# Patient Record
Sex: MALE | Race: WHITE | NOT HISPANIC OR LATINO
[De-identification: names, ages, dates, MRNs, and addresses within clinical notes are randomized per-mention and may not be internally consistent; named-entity substitution may affect disease eponyms.]

---

## 2017-03-18 ENCOUNTER — NON-APPOINTMENT (OUTPATIENT)
Age: 67
End: 2017-03-18

## 2017-03-18 ENCOUNTER — APPOINTMENT (OUTPATIENT)
Dept: CARDIOLOGY | Facility: CLINIC | Age: 67
End: 2017-03-18

## 2017-03-18 VITALS — DIASTOLIC BLOOD PRESSURE: 68 MMHG | SYSTOLIC BLOOD PRESSURE: 100 MMHG | HEART RATE: 72 BPM

## 2017-03-18 VITALS
OXYGEN SATURATION: 94 % | HEART RATE: 79 BPM | BODY MASS INDEX: 28.99 KG/M2 | DIASTOLIC BLOOD PRESSURE: 69 MMHG | RESPIRATION RATE: 12 BRPM | HEIGHT: 65 IN | WEIGHT: 174 LBS | TEMPERATURE: 97.7 F | SYSTOLIC BLOOD PRESSURE: 103 MMHG

## 2017-03-18 LAB
INR PPP: 2.2 RATIO
POCT-PROTHROMBIN TIME: 26.8 SECS

## 2017-07-08 ENCOUNTER — APPOINTMENT (OUTPATIENT)
Dept: CARDIOLOGY | Facility: CLINIC | Age: 67
End: 2017-07-08

## 2017-07-08 ENCOUNTER — NON-APPOINTMENT (OUTPATIENT)
Age: 67
End: 2017-07-08

## 2017-07-08 VITALS
SYSTOLIC BLOOD PRESSURE: 102 MMHG | HEIGHT: 65 IN | HEART RATE: 77 BPM | BODY MASS INDEX: 27.99 KG/M2 | TEMPERATURE: 98.3 F | WEIGHT: 168 LBS | OXYGEN SATURATION: 97 % | DIASTOLIC BLOOD PRESSURE: 70 MMHG | RESPIRATION RATE: 12 BRPM

## 2017-07-08 LAB — INR PPP: 1.8 RATIO

## 2017-11-11 ENCOUNTER — APPOINTMENT (OUTPATIENT)
Dept: CARDIOLOGY | Facility: CLINIC | Age: 67
End: 2017-11-11
Payer: MEDICARE

## 2017-11-11 ENCOUNTER — NON-APPOINTMENT (OUTPATIENT)
Age: 67
End: 2017-11-11

## 2017-11-11 VITALS
OXYGEN SATURATION: 97 % | DIASTOLIC BLOOD PRESSURE: 73 MMHG | SYSTOLIC BLOOD PRESSURE: 107 MMHG | BODY MASS INDEX: 28.32 KG/M2 | TEMPERATURE: 97.8 F | HEIGHT: 65 IN | WEIGHT: 170 LBS | RESPIRATION RATE: 12 BRPM | HEART RATE: 83 BPM

## 2017-11-11 LAB
INR PPP: 2.9 RATIO
POCT-PROTHROMBIN TIME: 35.2 SECS

## 2017-11-11 PROCEDURE — 93290 INTERROG DEV EVAL ICPMS IP: CPT | Mod: 26

## 2017-11-11 PROCEDURE — 93283 PRGRMG EVAL IMPLANTABLE DFB: CPT

## 2017-11-11 PROCEDURE — 85610 PROTHROMBIN TIME: CPT | Mod: QW

## 2017-11-11 PROCEDURE — 99215 OFFICE O/P EST HI 40 MIN: CPT | Mod: 25

## 2018-07-05 ENCOUNTER — OFFICE VISIT (OUTPATIENT)
Dept: URGENT CARE | Facility: CLINIC | Age: 68
End: 2018-07-05
Payer: COMMERCIAL

## 2018-07-05 VITALS
RESPIRATION RATE: 18 BRPM | TEMPERATURE: 97.2 F | DIASTOLIC BLOOD PRESSURE: 72 MMHG | BODY MASS INDEX: 28.82 KG/M2 | WEIGHT: 173 LBS | OXYGEN SATURATION: 97 % | HEART RATE: 72 BPM | HEIGHT: 65 IN | SYSTOLIC BLOOD PRESSURE: 112 MMHG

## 2018-07-05 DIAGNOSIS — S99.921A TOE INJURY, RIGHT, INITIAL ENCOUNTER: Primary | ICD-10-CM

## 2018-07-05 PROCEDURE — 99203 OFFICE O/P NEW LOW 30 MIN: CPT | Performed by: PHYSICIAN ASSISTANT

## 2018-07-05 RX ORDER — WARFARIN SODIUM 5 MG/1
TABLET ORAL
COMMUNITY

## 2018-07-05 RX ORDER — TAMSULOSIN HYDROCHLORIDE 0.4 MG/1
0.4 CAPSULE ORAL
COMMUNITY

## 2018-07-05 RX ORDER — CARVEDILOL 25 MG/1
25 TABLET ORAL 2 TIMES DAILY WITH MEALS
COMMUNITY

## 2018-07-05 RX ORDER — CEPHALEXIN 500 MG/1
500 CAPSULE ORAL EVERY 8 HOURS SCHEDULED
Qty: 21 CAPSULE | Refills: 0 | Status: SHIPPED | COMMUNITY
Start: 2018-07-05 | End: 2018-07-12

## 2018-07-05 RX ORDER — LISINOPRIL 5 MG/1
5 TABLET ORAL DAILY
COMMUNITY

## 2018-07-05 RX ORDER — SIMVASTATIN 20 MG
20 TABLET ORAL
COMMUNITY

## 2018-07-05 NOTE — PATIENT INSTRUCTIONS
Toenail is still intact  There is bleeding around the toenail  Advised patient that I could not remove the toenail in the clinic  There does not seem to be in injury to the nail bed  Will prescribe antibiotic  He should start taking the Keflex and finish the antibiotic  Take it as directed  Recommend follow-up with podiatrist in the next week  If he has worsening pain or the toe is becoming red or infected looking he should go to the emergency room

## 2018-07-05 NOTE — PROGRESS NOTES
[address] [facility]    NAME: Casi Doran is a 75 y o  male  : 1950    MRN: [id number]  DATE: 2018  TIME: 5:08 PM    Assessment and Plan   Toe injury, right, initial encounter [S99 921A]  1  Toe injury, right, initial encounter  cephalexin (KEFLEX) 500 mg capsule       Patient Instructions     Patient Instructions   Toenail is still intact  There is bleeding around the toenail  Advised patient that I could not remove the toenail in the clinic  There does not seem to be in injury to the nail bed  Will prescribe antibiotic  He should start taking the Keflex and finish the antibiotic  Take it as directed  Recommend follow-up with podiatrist in the next week  If he has worsening pain or the toe is becoming red or infected looking he should go to the emergency room  Chief Complaint     Chief Complaint   Patient presents with    Foot Pain     C/O injury to right great toe after stubbing his toe on concrete pool steps today  History of Present Illness   75year old male here with right great toe injury  Patient was swimming and getting out of the pool and hit his toe on the concrete steps  He has some bleeding around his nail  Patient is on Coumadin  Denies any pain  Is able to move his toe without any difficulty  Does have a thickened nail  Review of Systems   Review of Systems   Constitutional: Negative for activity change, appetite change, chills, diaphoresis, fatigue, fever and unexpected weight change  HENT: Negative for congestion, ear pain, hearing loss, sinus pressure, sneezing, sore throat and tinnitus  Eyes: Negative for photophobia, redness and visual disturbance  Respiratory: Negative for apnea, cough, chest tightness, shortness of breath, wheezing and stridor  Cardiovascular: Negative for chest pain, palpitations and leg swelling     Gastrointestinal: Negative for abdominal distention, abdominal pain, blood in stool, constipation, diarrhea, nausea and vomiting  Endocrine: Negative for cold intolerance, heat intolerance, polydipsia, polyphagia and polyuria  Genitourinary: Negative for difficulty urinating, dysuria, flank pain, hematuria and urgency  Musculoskeletal: Negative for arthralgias, back pain, gait problem, myalgias, neck pain and neck stiffness  See HPI   Skin: Negative for rash and wound  Neurological: Negative for dizziness, tremors, seizures, syncope, weakness, light-headedness, numbness and headaches  Hematological: Negative for adenopathy  Does not bruise/bleed easily  Psychiatric/Behavioral: Negative for agitation, behavioral problems, confusion and decreased concentration  The patient is not nervous/anxious  All other systems reviewed and are negative        Current Medications     Current Outpatient Prescriptions:     carvedilol (COREG) 25 mg tablet, Take 25 mg by mouth 2 (two) times a day with meals, Disp: , Rfl:     lisinopril (ZESTRIL) 5 mg tablet, Take 5 mg by mouth daily, Disp: , Rfl:     simvastatin (ZOCOR) 20 mg tablet, Take 20 mg by mouth daily at bedtime, Disp: , Rfl:     tamsulosin (FLOMAX) 0 4 mg, Take 0 4 mg by mouth daily with dinner, Disp: , Rfl:     warfarin (COUMADIN) 5 mg tablet, Take by mouth daily, Disp: , Rfl:     cephalexin (KEFLEX) 500 mg capsule, Take 1 capsule (500 mg total) by mouth every 8 (eight) hours for 7 days, Disp: 21 capsule, Rfl: 0    Current Allergies     Allergies as of 07/05/2018    (No Known Allergies)          The following portions of the patient's history were reviewed and updated as appropriate: allergies, current medications, past family history, past medical history, past social history, past surgical history and problem list    Past Medical History:   Diagnosis Date    BPH (benign prostatic hyperplasia)     CHF (congestive heart failure) ([facility] [address] )     Hyperlipidemia     Hypertension     ICD (implantable cardioverter-defibrillator) in place      Past Surgical History: Procedure Laterality Date    CHOLECYSTECTOMY       No family history on file  Social History     Social History    Marital status: /Civil Union     Spouse name: N/A    Number of children: N/A    Years of education: N/A     Occupational History    Not on file  Social History Main Topics    Smoking status: Never Smoker    Smokeless tobacco: Never Used    Alcohol use Not on file    Drug use: Unknown    Sexual activity: Not on file     Other Topics Concern    Not on file     Social History Narrative    No narrative on file     Medications have been verified  Objective   /72   Pulse 72   Temp (!) 97 2 °F (36 2 °C) (Tympanic)   Resp 18   Ht 5' 5" (1 651 m)   Wt 78 5 kg (173 lb)   SpO2 97%   BMI 28 79 kg/m²      Physical Exam   Physical Exam   Constitutional: He appears well-developed and well-nourished  No distress  HENT:   Head: Normocephalic  Right Ear: External ear normal    Left Ear: External ear normal    Nose: Nose normal    Mouth/Throat: Oropharynx is clear and moist  No oropharyngeal exudate  Neck: Normal range of motion  Neck supple  Cardiovascular: Normal rate, regular rhythm and normal heart sounds  No murmur heard  Pulmonary/Chest: Effort normal and breath sounds normal  No respiratory distress  He has no wheezes  He has no rales  Abdominal: Soft  Bowel sounds are normal  There is no tenderness  Musculoskeletal: Normal range of motion  Feet:    Lymphadenopathy:     He has no cervical adenopathy  Skin: Skin is warm  No rash noted  Vitals reviewed

## 2018-08-31 ENCOUNTER — APPOINTMENT (OUTPATIENT)
Dept: CARDIOLOGY | Facility: CLINIC | Age: 68
End: 2018-08-31
Payer: MEDICARE

## 2018-08-31 ENCOUNTER — NON-APPOINTMENT (OUTPATIENT)
Age: 68
End: 2018-08-31

## 2018-08-31 VITALS
BODY MASS INDEX: 28.32 KG/M2 | RESPIRATION RATE: 12 BRPM | WEIGHT: 170 LBS | HEART RATE: 79 BPM | OXYGEN SATURATION: 96 % | SYSTOLIC BLOOD PRESSURE: 131 MMHG | HEIGHT: 65 IN | TEMPERATURE: 97.9 F | DIASTOLIC BLOOD PRESSURE: 88 MMHG

## 2018-08-31 LAB
INR PPP: 2.1 RATIO
POCT-PROTHROMBIN TIME: 25.2 SECS
QUALITY CONTROL: YES

## 2018-08-31 PROCEDURE — 93290 INTERROG DEV EVAL ICPMS IP: CPT | Mod: 26

## 2018-08-31 PROCEDURE — 99215 OFFICE O/P EST HI 40 MIN: CPT | Mod: 25

## 2018-08-31 PROCEDURE — 93283 PRGRMG EVAL IMPLANTABLE DFB: CPT

## 2018-08-31 PROCEDURE — 93306 TTE W/DOPPLER COMPLETE: CPT

## 2018-08-31 PROCEDURE — 85610 PROTHROMBIN TIME: CPT | Mod: QW

## 2018-09-21 ENCOUNTER — OUTPATIENT (OUTPATIENT)
Dept: OUTPATIENT SERVICES | Facility: HOSPITAL | Age: 68
LOS: 1 days | End: 2018-09-21
Payer: MEDICARE

## 2018-09-21 ENCOUNTER — APPOINTMENT (OUTPATIENT)
Dept: CV DIAGNOSTICS | Facility: HOSPITAL | Age: 68
End: 2018-09-21

## 2018-09-21 DIAGNOSIS — Z95.5 PRESENCE OF CORONARY ANGIOPLASTY IMPLANT AND GRAFT: Chronic | ICD-10-CM

## 2018-09-21 DIAGNOSIS — I25.10 ATHEROSCLEROTIC HEART DISEASE OF NATIVE CORONARY ARTERY WITHOUT ANGINA PECTORIS: ICD-10-CM

## 2018-09-21 DIAGNOSIS — Z95.810 PRESENCE OF AUTOMATIC (IMPLANTABLE) CARDIAC DEFIBRILLATOR: Chronic | ICD-10-CM

## 2018-09-21 DIAGNOSIS — S82.90XA UNSPECIFIED FRACTURE OF UNSPECIFIED LOWER LEG, INITIAL ENCOUNTER FOR CLOSED FRACTURE: Chronic | ICD-10-CM

## 2018-09-21 PROCEDURE — 93018 CV STRESS TEST I&R ONLY: CPT

## 2018-09-21 PROCEDURE — 78452 HT MUSCLE IMAGE SPECT MULT: CPT

## 2018-09-21 PROCEDURE — 93016 CV STRESS TEST SUPVJ ONLY: CPT

## 2018-09-21 PROCEDURE — 78452 HT MUSCLE IMAGE SPECT MULT: CPT | Mod: 26

## 2018-09-21 PROCEDURE — A9500: CPT

## 2018-09-21 PROCEDURE — 93017 CV STRESS TEST TRACING ONLY: CPT

## 2018-09-22 ENCOUNTER — TRANSCRIPTION ENCOUNTER (OUTPATIENT)
Age: 68
End: 2018-09-22

## 2018-09-23 ENCOUNTER — INPATIENT (INPATIENT)
Facility: HOSPITAL | Age: 68
LOS: 1 days | Discharge: HOME | End: 2018-09-25
Attending: SURGERY | Admitting: SURGERY
Payer: MEDICARE

## 2018-09-23 ENCOUNTER — RESULT REVIEW (OUTPATIENT)
Age: 68
End: 2018-09-23

## 2018-09-23 VITALS
DIASTOLIC BLOOD PRESSURE: 69 MMHG | SYSTOLIC BLOOD PRESSURE: 108 MMHG | TEMPERATURE: 98 F | RESPIRATION RATE: 18 BRPM | OXYGEN SATURATION: 100 % | HEART RATE: 96 BPM

## 2018-09-23 DIAGNOSIS — Z95.810 PRESENCE OF AUTOMATIC (IMPLANTABLE) CARDIAC DEFIBRILLATOR: Chronic | ICD-10-CM

## 2018-09-23 DIAGNOSIS — S82.90XA UNSPECIFIED FRACTURE OF UNSPECIFIED LOWER LEG, INITIAL ENCOUNTER FOR CLOSED FRACTURE: Chronic | ICD-10-CM

## 2018-09-23 DIAGNOSIS — K37 UNSPECIFIED APPENDICITIS: ICD-10-CM

## 2018-09-23 DIAGNOSIS — Z95.5 PRESENCE OF CORONARY ANGIOPLASTY IMPLANT AND GRAFT: Chronic | ICD-10-CM

## 2018-09-23 DIAGNOSIS — Z90.49 ACQUIRED ABSENCE OF OTHER SPECIFIED PARTS OF DIGESTIVE TRACT: Chronic | ICD-10-CM

## 2018-09-23 LAB
ALBUMIN SERPL ELPH-MCNC: 3.9 G/DL — SIGNIFICANT CHANGE UP (ref 3.3–5)
ALP SERPL-CCNC: 64 U/L — SIGNIFICANT CHANGE UP (ref 40–120)
ALT FLD-CCNC: 19 U/L — SIGNIFICANT CHANGE UP (ref 4–41)
APPEARANCE UR: CLEAR — SIGNIFICANT CHANGE UP
APTT BLD: 38.7 SEC — HIGH (ref 27.5–37.4)
AST SERPL-CCNC: 31 U/L — SIGNIFICANT CHANGE UP (ref 4–40)
BACTERIA # UR AUTO: NEGATIVE — SIGNIFICANT CHANGE UP
BASE EXCESS BLDV CALC-SCNC: 5.3 MMOL/L — SIGNIFICANT CHANGE UP
BASOPHILS # BLD AUTO: 0.03 K/UL — SIGNIFICANT CHANGE UP (ref 0–0.2)
BASOPHILS NFR BLD AUTO: 0.3 % — SIGNIFICANT CHANGE UP (ref 0–2)
BILIRUB SERPL-MCNC: 3.4 MG/DL — HIGH (ref 0.2–1.2)
BILIRUB UR-MCNC: NEGATIVE — SIGNIFICANT CHANGE UP
BLD GP AB SCN SERPL QL: NEGATIVE — SIGNIFICANT CHANGE UP
BLOOD GAS VENOUS - CREATININE: 1.14 MG/DL — SIGNIFICANT CHANGE UP (ref 0.5–1.3)
BLOOD UR QL VISUAL: SIGNIFICANT CHANGE UP
BUN SERPL-MCNC: 14 MG/DL — SIGNIFICANT CHANGE UP (ref 7–23)
CALCIUM SERPL-MCNC: 9.3 MG/DL — SIGNIFICANT CHANGE UP (ref 8.4–10.5)
CHLORIDE BLDV-SCNC: 98 MMOL/L — SIGNIFICANT CHANGE UP (ref 96–108)
CHLORIDE SERPL-SCNC: 95 MMOL/L — LOW (ref 98–107)
CO2 SERPL-SCNC: 25 MMOL/L — SIGNIFICANT CHANGE UP (ref 22–31)
COLOR SPEC: YELLOW — SIGNIFICANT CHANGE UP
CREAT SERPL-MCNC: 1.13 MG/DL — SIGNIFICANT CHANGE UP (ref 0.5–1.3)
EOSINOPHIL # BLD AUTO: 0.02 K/UL — SIGNIFICANT CHANGE UP (ref 0–0.5)
EOSINOPHIL NFR BLD AUTO: 0.2 % — SIGNIFICANT CHANGE UP (ref 0–6)
GAS PNL BLDV: 134 MMOL/L — LOW (ref 136–146)
GLUCOSE BLDV-MCNC: 127 — HIGH (ref 70–99)
GLUCOSE SERPL-MCNC: 126 MG/DL — HIGH (ref 70–99)
GLUCOSE UR-MCNC: NEGATIVE — SIGNIFICANT CHANGE UP
HCO3 BLDV-SCNC: 26 MMOL/L — SIGNIFICANT CHANGE UP (ref 20–27)
HCT VFR BLD CALC: 47 % — SIGNIFICANT CHANGE UP (ref 39–50)
HCT VFR BLDV CALC: 49.3 % — SIGNIFICANT CHANGE UP (ref 39–51)
HGB BLD-MCNC: 15.5 G/DL — SIGNIFICANT CHANGE UP (ref 13–17)
HGB BLDV-MCNC: 16.1 G/DL — SIGNIFICANT CHANGE UP (ref 13–17)
HYALINE CASTS # UR AUTO: NEGATIVE — SIGNIFICANT CHANGE UP
IMM GRANULOCYTES # BLD AUTO: 0.06 # — SIGNIFICANT CHANGE UP
IMM GRANULOCYTES NFR BLD AUTO: 0.5 % — SIGNIFICANT CHANGE UP (ref 0–1.5)
INR BLD: 2.33 — HIGH (ref 0.88–1.17)
KETONES UR-MCNC: SIGNIFICANT CHANGE UP
LACTATE BLDV-MCNC: 1.4 MMOL/L — SIGNIFICANT CHANGE UP (ref 0.5–2)
LEUKOCYTE ESTERASE UR-ACNC: NEGATIVE — SIGNIFICANT CHANGE UP
LIDOCAIN IGE QN: 30.8 U/L — SIGNIFICANT CHANGE UP (ref 7–60)
LYMPHOCYTES # BLD AUTO: 1.45 K/UL — SIGNIFICANT CHANGE UP (ref 1–3.3)
LYMPHOCYTES # BLD AUTO: 12.2 % — LOW (ref 13–44)
MCHC RBC-ENTMCNC: 28.9 PG — SIGNIFICANT CHANGE UP (ref 27–34)
MCHC RBC-ENTMCNC: 33 % — SIGNIFICANT CHANGE UP (ref 32–36)
MCV RBC AUTO: 87.7 FL — SIGNIFICANT CHANGE UP (ref 80–100)
MONOCYTES # BLD AUTO: 1.33 K/UL — HIGH (ref 0–0.9)
MONOCYTES NFR BLD AUTO: 11.2 % — SIGNIFICANT CHANGE UP (ref 2–14)
NEUTROPHILS # BLD AUTO: 9 K/UL — HIGH (ref 1.8–7.4)
NEUTROPHILS NFR BLD AUTO: 75.6 % — SIGNIFICANT CHANGE UP (ref 43–77)
NITRITE UR-MCNC: NEGATIVE — SIGNIFICANT CHANGE UP
NRBC # FLD: 0 — SIGNIFICANT CHANGE UP
PCO2 BLDV: 55 MMHG — HIGH (ref 41–51)
PH BLDV: 7.36 PH — SIGNIFICANT CHANGE UP (ref 7.32–7.43)
PH UR: 6 — SIGNIFICANT CHANGE UP (ref 5–8)
PLATELET # BLD AUTO: 197 K/UL — SIGNIFICANT CHANGE UP (ref 150–400)
PMV BLD: 10 FL — SIGNIFICANT CHANGE UP (ref 7–13)
PO2 BLDV: < 24 MMHG — LOW (ref 35–40)
POTASSIUM BLDV-SCNC: 4.1 MMOL/L — SIGNIFICANT CHANGE UP (ref 3.4–4.5)
POTASSIUM SERPL-MCNC: 4.9 MMOL/L — SIGNIFICANT CHANGE UP (ref 3.5–5.3)
POTASSIUM SERPL-SCNC: 4.9 MMOL/L — SIGNIFICANT CHANGE UP (ref 3.5–5.3)
PROT SERPL-MCNC: 7.6 G/DL — SIGNIFICANT CHANGE UP (ref 6–8.3)
PROT UR-MCNC: 50 — SIGNIFICANT CHANGE UP
PROTHROM AB SERPL-ACNC: 26.3 SEC — HIGH (ref 9.8–13.1)
RBC # BLD: 5.36 M/UL — SIGNIFICANT CHANGE UP (ref 4.2–5.8)
RBC # FLD: 13.1 % — SIGNIFICANT CHANGE UP (ref 10.3–14.5)
RBC CASTS # UR COMP ASSIST: SIGNIFICANT CHANGE UP (ref 0–?)
RH IG SCN BLD-IMP: POSITIVE — SIGNIFICANT CHANGE UP
RH IG SCN BLD-IMP: POSITIVE — SIGNIFICANT CHANGE UP
SAO2 % BLDV: 14.1 % — LOW (ref 60–85)
SODIUM SERPL-SCNC: 134 MMOL/L — LOW (ref 135–145)
SP GR SPEC: 1.03 — SIGNIFICANT CHANGE UP (ref 1–1.04)
SQUAMOUS # UR AUTO: SIGNIFICANT CHANGE UP
UROBILINOGEN FLD QL: SIGNIFICANT CHANGE UP
WBC # BLD: 11.89 K/UL — HIGH (ref 3.8–10.5)
WBC # FLD AUTO: 11.89 K/UL — HIGH (ref 3.8–10.5)
WBC UR QL: SIGNIFICANT CHANGE UP (ref 0–?)

## 2018-09-23 PROCEDURE — 44970 LAPAROSCOPY APPENDECTOMY: CPT | Mod: GC

## 2018-09-23 PROCEDURE — 71045 X-RAY EXAM CHEST 1 VIEW: CPT | Mod: 26

## 2018-09-23 PROCEDURE — 88304 TISSUE EXAM BY PATHOLOGIST: CPT | Mod: 26

## 2018-09-23 PROCEDURE — 99222 1ST HOSP IP/OBS MODERATE 55: CPT | Mod: GC,57

## 2018-09-23 PROCEDURE — 74177 CT ABD & PELVIS W/CONTRAST: CPT | Mod: 26

## 2018-09-23 RX ORDER — SODIUM CHLORIDE 9 MG/ML
1000 INJECTION, SOLUTION INTRAVENOUS
Qty: 0 | Refills: 0 | Status: DISCONTINUED | OUTPATIENT
Start: 2018-09-23 | End: 2018-09-24

## 2018-09-23 RX ORDER — HYDROMORPHONE HYDROCHLORIDE 2 MG/ML
0.5 INJECTION INTRAMUSCULAR; INTRAVENOUS; SUBCUTANEOUS
Qty: 0 | Refills: 0 | Status: DISCONTINUED | OUTPATIENT
Start: 2018-09-23 | End: 2018-09-24

## 2018-09-23 RX ORDER — SIMVASTATIN 20 MG/1
20 TABLET, FILM COATED ORAL AT BEDTIME
Qty: 0 | Refills: 0 | Status: DISCONTINUED | OUTPATIENT
Start: 2018-09-23 | End: 2018-09-25

## 2018-09-23 RX ORDER — ONDANSETRON 8 MG/1
4 TABLET, FILM COATED ORAL ONCE
Qty: 0 | Refills: 0 | Status: DISCONTINUED | OUTPATIENT
Start: 2018-09-23 | End: 2018-09-24

## 2018-09-23 RX ORDER — PIPERACILLIN AND TAZOBACTAM 4; .5 G/20ML; G/20ML
3.38 INJECTION, POWDER, LYOPHILIZED, FOR SOLUTION INTRAVENOUS ONCE
Qty: 0 | Refills: 0 | Status: COMPLETED | OUTPATIENT
Start: 2018-09-23 | End: 2018-09-23

## 2018-09-23 RX ORDER — LISINOPRIL 2.5 MG/1
5 TABLET ORAL DAILY
Qty: 0 | Refills: 0 | Status: DISCONTINUED | OUTPATIENT
Start: 2018-09-23 | End: 2018-09-25

## 2018-09-23 RX ORDER — SODIUM CHLORIDE 9 MG/ML
500 INJECTION INTRAMUSCULAR; INTRAVENOUS; SUBCUTANEOUS ONCE
Qty: 0 | Refills: 0 | Status: COMPLETED | OUTPATIENT
Start: 2018-09-23 | End: 2018-09-23

## 2018-09-23 RX ORDER — ASPIRIN/CALCIUM CARB/MAGNESIUM 324 MG
81 TABLET ORAL DAILY
Qty: 0 | Refills: 0 | Status: DISCONTINUED | OUTPATIENT
Start: 2018-09-23 | End: 2018-09-25

## 2018-09-23 RX ORDER — CEFOTETAN DISODIUM 1 G
2 VIAL (EA) INJECTION EVERY 12 HOURS
Qty: 0 | Refills: 0 | Status: DISCONTINUED | OUTPATIENT
Start: 2018-09-24 | End: 2018-09-25

## 2018-09-23 RX ORDER — HYDROMORPHONE HYDROCHLORIDE 2 MG/ML
0.5 INJECTION INTRAMUSCULAR; INTRAVENOUS; SUBCUTANEOUS ONCE
Qty: 0 | Refills: 0 | Status: DISCONTINUED | OUTPATIENT
Start: 2018-09-23 | End: 2018-09-23

## 2018-09-23 RX ORDER — PHYTONADIONE (VIT K1) 5 MG
5 TABLET ORAL ONCE
Qty: 0 | Refills: 0 | Status: COMPLETED | OUTPATIENT
Start: 2018-09-23 | End: 2018-09-23

## 2018-09-23 RX ORDER — SODIUM CHLORIDE 9 MG/ML
1000 INJECTION INTRAMUSCULAR; INTRAVENOUS; SUBCUTANEOUS ONCE
Qty: 0 | Refills: 0 | Status: COMPLETED | OUTPATIENT
Start: 2018-09-23 | End: 2018-09-23

## 2018-09-23 RX ORDER — CARVEDILOL PHOSPHATE 80 MG/1
25 CAPSULE, EXTENDED RELEASE ORAL EVERY 12 HOURS
Qty: 0 | Refills: 0 | Status: DISCONTINUED | OUTPATIENT
Start: 2018-09-23 | End: 2018-09-25

## 2018-09-23 RX ADMIN — HYDROMORPHONE HYDROCHLORIDE 0.5 MILLIGRAM(S): 2 INJECTION INTRAMUSCULAR; INTRAVENOUS; SUBCUTANEOUS at 10:02

## 2018-09-23 RX ADMIN — Medication 101 MILLIGRAM(S): at 18:33

## 2018-09-23 RX ADMIN — SODIUM CHLORIDE 500 MILLILITER(S): 9 INJECTION INTRAMUSCULAR; INTRAVENOUS; SUBCUTANEOUS at 15:23

## 2018-09-23 RX ADMIN — SODIUM CHLORIDE 1000 MILLILITER(S): 9 INJECTION INTRAMUSCULAR; INTRAVENOUS; SUBCUTANEOUS at 10:02

## 2018-09-23 RX ADMIN — HYDROMORPHONE HYDROCHLORIDE 0.5 MILLIGRAM(S): 2 INJECTION INTRAMUSCULAR; INTRAVENOUS; SUBCUTANEOUS at 10:45

## 2018-09-23 RX ADMIN — PIPERACILLIN AND TAZOBACTAM 3.38 GRAM(S): 4; .5 INJECTION, POWDER, LYOPHILIZED, FOR SOLUTION INTRAVENOUS at 15:04

## 2018-09-23 RX ADMIN — SODIUM CHLORIDE 100 MILLILITER(S): 9 INJECTION, SOLUTION INTRAVENOUS at 17:49

## 2018-09-23 RX ADMIN — HYDROMORPHONE HYDROCHLORIDE 0.5 MILLIGRAM(S): 2 INJECTION INTRAMUSCULAR; INTRAVENOUS; SUBCUTANEOUS at 13:53

## 2018-09-23 RX ADMIN — PIPERACILLIN AND TAZOBACTAM 200 GRAM(S): 4; .5 INJECTION, POWDER, LYOPHILIZED, FOR SOLUTION INTRAVENOUS at 13:59

## 2018-09-23 RX ADMIN — HYDROMORPHONE HYDROCHLORIDE 0.5 MILLIGRAM(S): 2 INJECTION INTRAMUSCULAR; INTRAVENOUS; SUBCUTANEOUS at 14:31

## 2018-09-23 RX ADMIN — SODIUM CHLORIDE 1000 MILLILITER(S): 9 INJECTION INTRAMUSCULAR; INTRAVENOUS; SUBCUTANEOUS at 15:23

## 2018-09-23 NOTE — H&P ADULT - HISTORY OF PRESENT ILLNESS
General Surgery  CC: abdominal pain  HPI: 68M h/o HFrEF (~35%), AICD on coumadin current INR of 2.33 presents with two days of right lower abdominal pain, nausea and subsequent CT imaging showing acute appendicitis w/concern for perforation, although without obvious abscess. Patient is focally tender in the right lower quadrant. Leukocytotic to 12. Routine recent stress test without significant findings relayed to patient. The patient is currently afebrile and non-toxic appearing. Denies chest pain, shortness of breath, dizziness or syncope.     Medical and Surgical History  HTN (hypertension)  CHF (congestive heart failure)  H/o remote open narendra that lead to ICU stay 2/2 sepsis induced CHF exacerbation.

## 2018-09-23 NOTE — ED PROVIDER NOTE - ATTENDING CONTRIBUTION TO CARE
Attending Statement: I have personally seen and examined this patient. I have fully participated in the care of this patient. I have reviewed all pertinent clinical information, including history physical exam, plan and the Resident's note and agree except as noted  67yo M hx of CHF, HTN, HLD, CAD, PPM, on coumadin, pw abdominal pain x 2 days. Constant, non radiating periumbilical pain. No associated nausea/vomit/diarrhea. Denies any dysuria/freq or urgency. no hematuria. no fever/chills. no scrotal pain no back pain. no melena no BRBPR. no trauma. pt had a nuclear stress test Friday afternoon, pain started that evening. no travel. no sick contact.   Vital signs noted. pt appears uncomfortable in pain. nontoxic. mmm. normal S1-S2 No resp distress. able to speak in full and clear sentences. no wheeze, rales or stridor. soft nondistended, old scar at the RUQ, tender lower right, lower mid abdomen. no rebound. no cvat. no inguinal hernia. examined with Dr Curran at bedside  plan labs, ua, ct abdomen, IVF, pain med, and re assess

## 2018-09-23 NOTE — BRIEF OPERATIVE NOTE - OPERATION/FINDINGS
thickened inflamed perforated/gangrenous appendix with mucinous effluent. No thick pus. Murky ascites. Staple line on cecum revised with repeat load of purple stapler because serosa  from initial staple line. Mesoappendix taken with tan load.  Hemostasis obtained with hook electrocautery to RP/mesentery.

## 2018-09-23 NOTE — ED PROVIDER NOTE - PROGRESS NOTE DETAILS
pain improved. pending labs/ct pain resolved. no nausea. informed of results pending ct pt informed ct cw w appendicitis no abscess. abx ordered. surgery paged. Bina PGY2: discussed with surgery, will see pt in ED   Exam (Male): Un-Circumcised penis, external anatomy appears normal, no e/o priapism, no e/o trama. No testicular swelling, erythema, or tenderness. No urethral discharge or bleeding. Cremasteric reflex intact b/l. Large reducible L sided inguinal hernia   EXAM WITH: SHAHLA Vidal MD pt informed ct cw w appendicitis early perforation no abscess. abx ordered. surgery informed and consulted Bina PGY2: discussed with surgery urgent need for eval in ED, informed surgery pt in more pain, and c/f perforation on CT, per resident will see pt as soon as possible.

## 2018-09-23 NOTE — ED PROVIDER NOTE - PHYSICAL EXAMINATION
GEN APPEARANCE: WDWN, alert and cooperative, non-toxic appearing and in NAD  HEAD: Atraumatic, normocephalic   EYES: PERRLa, EOMI, vision grossly intact.   EARS: Gross hearing intact.   NECK: Supple  CV: RRR, S1S2, no c/r/m/g. No cyanosis or pallor. Extremities warm, well perfused. Cap refill <2 seconds. No bruits.   LUNGS: CTAB. No wheezing. No rales. No rhonchi. No diminished breath sounds.   ABDOMEN: RLQ/periumbilical ttp No masses. RUQ surgical incision    MSK: Spine appears normal, no spine point tenderness. No CVA ttp. No joint erythema or tenderness. Normal muscular development. Pelvis stable.  EXTREMITIES: No peripheral edema. No obvious joint or bony deformity.  NEURO: Alert, follows commands. Weight bearing normal. Speech normal. Sensation and motor normal x4 extremities.   SKIN: Normal color for race, warm, dry and intact. No evidence of rash.  PSYCH: Normal mood and affect.

## 2018-09-23 NOTE — BRIEF OPERATIVE NOTE - PROCEDURE
<<-----Click on this checkbox to enter Procedure Laparoscopic appendectomy  09/23/2018    Active  [id number]

## 2018-09-23 NOTE — ED PROVIDER NOTE - MEDICAL DECISION MAKING DETAILS
Bina PGY2: 68F hx of CHF HTN pre-DM on coumadin s/p narendra presents with a cc of periumbilical pain x2 days non radiating, persisting, has not had bad like this before, last bowel movement yesterday evening, mild nausea, no vomiting, no testicular or groin pain.  exam vss well appearing non toxic RLQ ttp c/f appendicitis vs uti vs renal colic, low c/f ami will get labs ua ctap ivf pain control reassess

## 2018-09-23 NOTE — H&P ADULT - NSHPREVIEWOFSYSTEMS_GEN_ALL_CORE
General: denies weight change, fever or fatigue  HEENT: denies sore throat, hoarseness  Respiratory: denies cough, shortness of breath at rest and on exertion, wheezing  Cardiovascular: denies chest pain, abnormal heart rhythm, PND, palpitations  Gastrointestinal: +RLQ tenderness; resolving nausea  Genitourinary: denies flank pain, urinary frequency or dysuria

## 2018-09-23 NOTE — H&P ADULT - ASSESSMENT
68M w/cardiac history and INR of 2.3 who p/w acute appendicitis     Appendicitis  - admit to surgery Dr. Devyn Ashraf  - plan for operative laparoscopic appendectomy w/Dr. Ashraf today; risks, benefits and options discussed with the patient  - NPO w/IVFs  - administered Zosyn 1 gram in ED  - lab work appreciated [type and screen x2 completed, coags completed]  - DVT ppx if patient stays; currently ambulatory and active  - consent signed and in chart  - restart cardiac home medications of patient stays overnight  - above plan d/w Dr. Ashraf    [facility] PGY3  [id number]

## 2018-09-23 NOTE — ED ADULT TRIAGE NOTE - CHIEF COMPLAINT QUOTE
pt c/o generalized abd pain "severe at times" x 2 days. "it started after my nuclear stress test 2 days ago."   Denies nausea/vomiting.  c/o +chills.   denies chest pain. states +BM last night.  h/o AICD, HTN, cardiac stent

## 2018-09-23 NOTE — H&P ADULT - NSHPLABSRESULTS_GEN_ALL_CORE
Vital Signs Last 24 Hrs  T(C): 36.8 (23 Sep 2018 12:49), Max: 36.9 (23 Sep 2018 09:15)  T(F): 98.3 (23 Sep 2018 12:49), Max: 98.5 (23 Sep 2018 09:15)  HR: 96 (23 Sep 2018 12:49) (94 - 96)  BP: 113/68 (23 Sep 2018 12:49) (108/69 - 113/68)  BP(mean): --  RR: 16 (23 Sep 2018 12:49) (16 - 18)  SpO2: 98% (23 Sep 2018 12:49) (98% - 100%)    I&O's Detail                            15.5   11.89 )-----------( 197      ( 23 Sep 2018 09:55 )             47.0           134<L>  |  95<L>  |  14  ----------------------------<  126<H>  4.9   |  25  |  1.13    Ca    9.3      23 Sep 2018 09:55    TPro  7.6  /  Alb  3.9  /  TBili  3.4<H>  /  DBili  x   /  AST  31  /  ALT  19  /  AlkPhos  64  -      CAPILLARY BLOOD GLUCOSE          LIVER FUNCTIONS - ( 23 Sep 2018 09:55 )  Alb: 3.9 g/dL / Pro: 7.6 g/dL / ALK PHOS: 64 u/L / ALT: 19 u/L / AST: 31 u/L / GGT: x             PT/INR - ( 23 Sep 2018 09:55 )   PT: 26.3 SEC;   INR: 2.33          PTT - ( 23 Sep 2018 09:55 )  PTT:38.7 SEC    Urinalysis Basic - ( 23 Sep 2018 09:55 )    Color: YELLOW / Appearance: CLEAR / S.028 / pH: 6.0  Gluc: NEGATIVE / Ketone: SMALL  / Bili: NEGATIVE / Urobili: TRACE   Blood: SMALL / Protein: 50 / Nitrite: NEGATIVE   Leuk Esterase: NEGATIVE / RBC: 3-5 / WBC 0-2   Sq Epi: OCC / Non Sq Epi: x / Bacteria: NEGATIVE

## 2018-09-23 NOTE — H&P ADULT - NSHPPHYSICALEXAM_GEN_ALL_CORE
Physical Exam:  General: Well developed, well nourished, alert and cooperative, and appears to be in no acute distress.  HEENT: normocephalic, vision is grossly intact  Neck: Neck supple, non-tender without lymphadenopathy, masses or thyromegaly  Chest: lungs clear bilaterally, non-labored breathing, no wheezing or rhonci  Cardiac: regular rhythm, rate of 84  Abdomen: focally tender right lower quadrant

## 2018-09-23 NOTE — ED ADULT NURSE NOTE - OBJECTIVE STATEMENT
received pt A&Ox3 in no apparent distress at this time. #20g IVL to L AC. bloods drawn and sent to the lab. no s/s of infiltration noted at this time. family and MD at bedside. vss, ua sent. gastroview given and tolerated. dispo pending.

## 2018-09-23 NOTE — ED PROVIDER NOTE - OBJECTIVE STATEMENT
68F hx of CHF HTN pre-DM on coumadin s/p narendra presents with a cc of periumbilical pain x2 days non radiating, persisting, has not had bad like this before, last bowel movement yesterday evening, mild nausea, no vomiting, no testicular or groin pain. No prior hx abdominal aneurism. Reports recently had a stress test on Friday after PMD finding "fluid around heart" - in setting of otherwise routine check up. Denies v/f/c/cp/sob. Denies headache, syncope, lightheadedness, dizziness. Denies chest palpitations, Denies dysuria, hematuria, hematochezia, BRBPR, tarry stools, diarrhea, constipation.

## 2018-09-24 PROCEDURE — 93282 PRGRMG EVAL IMPLANTABLE DFB: CPT | Mod: 26

## 2018-09-24 RX ORDER — ACETAMINOPHEN 500 MG
650 TABLET ORAL EVERY 6 HOURS
Qty: 0 | Refills: 0 | Status: DISCONTINUED | OUTPATIENT
Start: 2018-09-24 | End: 2018-09-25

## 2018-09-24 RX ORDER — OXYCODONE HYDROCHLORIDE 5 MG/1
10 TABLET ORAL EVERY 4 HOURS
Qty: 0 | Refills: 0 | Status: DISCONTINUED | OUTPATIENT
Start: 2018-09-24 | End: 2018-09-25

## 2018-09-24 RX ORDER — ENOXAPARIN SODIUM 100 MG/ML
40 INJECTION SUBCUTANEOUS DAILY
Qty: 0 | Refills: 0 | Status: DISCONTINUED | OUTPATIENT
Start: 2018-09-24 | End: 2018-09-25

## 2018-09-24 RX ORDER — OXYCODONE HYDROCHLORIDE 5 MG/1
5 TABLET ORAL EVERY 4 HOURS
Qty: 0 | Refills: 0 | Status: DISCONTINUED | OUTPATIENT
Start: 2018-09-24 | End: 2018-09-25

## 2018-09-24 RX ORDER — INFLUENZA VIRUS VACCINE 15; 15; 15; 15 UG/.5ML; UG/.5ML; UG/.5ML; UG/.5ML
0.5 SUSPENSION INTRAMUSCULAR ONCE
Qty: 0 | Refills: 0 | Status: DISCONTINUED | OUTPATIENT
Start: 2018-09-24 | End: 2018-09-25

## 2018-09-24 RX ORDER — CARVEDILOL PHOSPHATE 80 MG/1
1 CAPSULE, EXTENDED RELEASE ORAL
Qty: 0 | Refills: 0 | COMMUNITY

## 2018-09-24 RX ADMIN — CARVEDILOL PHOSPHATE 25 MILLIGRAM(S): 80 CAPSULE, EXTENDED RELEASE ORAL at 21:49

## 2018-09-24 RX ADMIN — OXYCODONE HYDROCHLORIDE 10 MILLIGRAM(S): 5 TABLET ORAL at 06:04

## 2018-09-24 RX ADMIN — OXYCODONE HYDROCHLORIDE 10 MILLIGRAM(S): 5 TABLET ORAL at 17:54

## 2018-09-24 RX ADMIN — SIMVASTATIN 20 MILLIGRAM(S): 20 TABLET, FILM COATED ORAL at 21:49

## 2018-09-24 RX ADMIN — LISINOPRIL 5 MILLIGRAM(S): 2.5 TABLET ORAL at 08:54

## 2018-09-24 RX ADMIN — Medication 650 MILLIGRAM(S): at 06:04

## 2018-09-24 RX ADMIN — OXYCODONE HYDROCHLORIDE 10 MILLIGRAM(S): 5 TABLET ORAL at 05:22

## 2018-09-24 RX ADMIN — CARVEDILOL PHOSPHATE 25 MILLIGRAM(S): 80 CAPSULE, EXTENDED RELEASE ORAL at 08:54

## 2018-09-24 RX ADMIN — OXYCODONE HYDROCHLORIDE 10 MILLIGRAM(S): 5 TABLET ORAL at 17:00

## 2018-09-24 RX ADMIN — Medication 650 MILLIGRAM(S): at 05:22

## 2018-09-24 RX ADMIN — Medication 81 MILLIGRAM(S): at 21:49

## 2018-09-24 RX ADMIN — Medication 100 GRAM(S): at 08:54

## 2018-09-24 RX ADMIN — Medication 650 MILLIGRAM(S): at 17:00

## 2018-09-24 RX ADMIN — Medication 650 MILLIGRAM(S): at 18:05

## 2018-09-24 RX ADMIN — Medication 100 GRAM(S): at 21:49

## 2018-09-24 NOTE — PROGRESS NOTE ADULT - SUBJECTIVE AND OBJECTIVE BOX
ELECTROPHYSIOLOGY  Device Interrogation Performed                                  Date/Time: 18  : SILAS dual chamber ICD       Model: Evera XT DR             Mode:  AAI-DDD           Rate:  bpm                                                                  Total V pacin.1 %      Atrial Lead:  P wave amplitude:  2.8      mv          Impedence:        Ohms      Threshold:      V@    ms      Ventricular Lead(s):  RV Lead: R wave amplitude:     mv          Impedence:      Ohms      Threshold:     V@    ms   LV Lead:  R wave amplitude:      mv          Impedence:      Ohms      Threshold:     V@     ms     Battery Status:                     Good                BRENNON                     EOL    Underlying Rhythm:       Events/Observation:     Impression/Plan:  Normal PPM / ICD function.   Normal sensing and pacing via iterative testing. Good battery status. Excellent threshold capture.  No reprogramming. ELECTROPHYSIOLOGY  Device Interrogation Performed                                  Date/Time: 18  : SILAS dual chamber ICD       Model: Evera XT DR             Mode:  AAI-DDD           Rate:  bpm                                                                  Total V pacin.1 %      Atrial Lead:  P wave amplitude:  2.8      mv          Impedence: 532        Ohms      Threshold: 0.75     V@ 0.40   ms      Ventricular Lead(s):  RV Lead: R wave amplitude: 5.6    mv          Impedence: 456     Ohms      Threshold:  1.50   V@ 0.40   ms   LV Lead:  R wave amplitude:      mv          Impedence:      Ohms      Threshold:     V@     ms     Battery Status:       X              Good                BRENNON                     EOL    Underlying Rhythm:     Sinus rhythm with HR 80s    Events/Observation:  One episode of NSVT lasting up to 1 sec. @ 194 bpm on 18     Impression/Plan:  Normal PPM / ICD function.   Normal sensing and pacing via iterative testing. Good battery status. Excellent threshold capture.  No reprogramming.   No events corresponding to this admission. Interrogation done post magnet use

## 2018-09-24 NOTE — CHART NOTE - NSCHARTNOTEFT_GEN_A_CORE
TEAM: A Team    TIME:09-24-18 @ 03:08    PROCEDURE: Lap appendectomy    SUBJECTIVE:  Pt seen + examined at bedside. Pt is AOx3, pain well controlled.  SOB:  [] YES [x] NO  Dyspnea: []YES [x]NO  Chest Discomfort: [] YES [x] NO    Nausea: [] YES [x] NO           Vomiting: [] YES [x] NO  Flatus: [] YES [x] NO             Bowel Movement: [] YES [x] NO  Diarrhea: [] YES [x] NO         Constipation: [] YES [x] NO     Pain (0-10):   1  Pain Control Adequate: [x] YES [] NO  Void: [x]YES []No    OBJECTIVE:  T(C): 36.4 (09-24-18 @ 02:55), Max: 36.9 (09-23-18 @ 09:15)  HR: 84 (09-24-18 @ 02:55) (81 - 100)  BP: 113/72 (09-24-18 @ 02:55) (98/61 - 113/72)  RR: 18 (09-24-18 @ 02:55) (14 - 18)  SpO2: 98% (09-24-18 @ 02:55) (95% - 100%)      Weight (kg): 77.1 (09-24-18 @ 02:55)    IN/OUT:      Suad: [ ] Y  [x ] N    NGT: [ ] Y  [ x] N         ---------------------------------------------------------------------------------------------   PHYSICAL EXAM:   General: NAD, Lying in bed comfortably  Neuro: alert, oriented x3  HEENT: NC/AT, EOMI  Neck: Soft, supple  Resp: Good effort  GI/Abd: Soft, NT/ND, no rebound/guarding, no masses palpated. Incisions CDI  Vascular: All 4 extremities warm.  Skin: Intact, no breakdown  Lymphatic/Nodes: No palpable lymphadenopathy  Musculoskeletal: All 4 extremities moving spontaneously, no limitations, no LE edema    ---------------------------------------------------------------------------------------------   MEDICATIONS:  acetaminophen   Tablet .. 650 milliGRAM(s) Oral every 6 hours  aspirin enteric coated 81 milliGRAM(s) Oral daily  carvedilol 25 milliGRAM(s) Oral every 12 hours  cefoTEtan  IVPB 2 Gram(s) IV Intermittent every 12 hours  HYDROmorphone  Injectable 0.5 milliGRAM(s) IV Push every 10 minutes PRN  influenza   Vaccine 0.5 milliLiter(s) IntraMuscular once  lactated ringers. 1000 milliLiter(s) IV Continuous <Continuous>  lisinopril 5 milliGRAM(s) Oral daily  ondansetron Injectable 4 milliGRAM(s) IV Push once PRN  oxyCODONE    IR 5 milliGRAM(s) Oral every 4 hours PRN  oxyCODONE    IR 10 milliGRAM(s) Oral every 4 hours PRN  simvastatin 20 milliGRAM(s) Oral at bedtime      ---------------------------------------------------------------------------------------------   LABS:                          15.5   11.89 )-----------( 197      ( 23 Sep 2018 09:55 )             47.0     134<L>  |  95<L>  |  14  ----------------------------<  126<H>  4.9   |  25  |  1.13    Ca    9.3     TPro  7.6  /  Alb  3.9  /  TBili  3.4<H>  /  DBili  x   /  AST  31  /  ALT  19  /  AlkPhos  64      ---------------------------------------------------------------------------------------------       ASSESSMENT  68y male s/p  lap appendectomy    PLAN  - Diet: CLD  - Pain control with PO/IV medications.    - Continue home medications  - Consult EP in morning  - OOB, ambulate as tolerated  - continue chemical VTE ppx and mechanical VTE ppx  - Will discuss with attending.

## 2018-09-24 NOTE — PROGRESS NOTE ADULT - SUBJECTIVE AND OBJECTIVE BOX
SURGERY  PROGRESS NOTE:     Overnight Events:  No nausea or vomiting. abdominal pain improved.      Physical Exam  Vital Signs Last 24 Hrs  T(C): 36.4 (24 Sep 2018 02:55), Max: 36.9 (23 Sep 2018 09:15)  T(F): 97.5 (24 Sep 2018 02:55), Max: 98.5 (23 Sep 2018 09:15)  HR: 81 (24 Sep 2018 05:21) (81 - 100)  BP: 117/77 (24 Sep 2018 05:21) (98/61 - 117/77)  RR: 18 (24 Sep 2018 05:21) (14 - 18)  SpO2: 97% (24 Sep 2018 05:21) (95% - 100%)    Gen: NAD  HEENT: normocephalic  CV: S1, S2  Pulm: CTA B/L  Abd: Soft, ND, appropriately tender, incisions c/d/i  Ext: warm    I&O's Detail    23 Sep 2018 07:01  -  24 Sep 2018 07:00  --------------------------------------------------------  IN:    lactated ringers.: 250 mL  Total IN: 250 mL    OUT:    Voided: 600 mL  Total OUT: 600 mL    Total NET: -350 mL    Labs:                        15.5   11.89 )-----------( 197      ( 23 Sep 2018 09:55 )             47.0     0923    134<L>  |  95<L>  |  14  ----------------------------<  126<H>  4.9   |  25  |  1.13    Ca    9.3      23 Sep 2018 09:55    TPro  7.6  /  Alb  3.9  /  TBili  3.4<H>  /  DBili  x   /  AST  31  /  ALT  19  /  AlkPhos  64  09-23    PT/INR - ( 23 Sep 2018 09:55 )   PT: 26.3 SEC;   INR: 2.33        PTT - ( 23 Sep 2018 09:55 )  PTT:38.7 SEC  Urinalysis Basic - ( 23 Sep 2018 09:55 )    Color: YELLOW / Appearance: CLEAR / S.028 / pH: 6.0  Gluc: NEGATIVE / Ketone: SMALL  / Bili: NEGATIVE / Urobili: TRACE   Blood: SMALL / Protein: 50 / Nitrite: NEGATIVE   Leuk Esterase: NEGATIVE / RBC: 3-5 / WBC 0-2   Sq Epi: OCC / Non Sq Epi: x / Bacteria: NEGATIVE

## 2018-09-24 NOTE — PROGRESS NOTE ADULT - ASSESSMENT
69 yo male with perforated appendicitis s/p appendectomy  -EP for AICD evaluation  -Clear diet will advance if tolerates diet  -Pain control  -Continue with IV Antibiotics    A team surgery [id number]

## 2018-09-24 NOTE — PROGRESS NOTE ADULT - SUBJECTIVE AND OBJECTIVE BOX
ANESTHESIA POSTOP CHECK    68y Male POSTOP DAY 1     Vital Signs Last 24 Hrs  T(C): 36.4 (24 Sep 2018 08:30), Max: 36.9 (23 Sep 2018 09:15)  T(F): 97.6 (24 Sep 2018 08:30), Max: 98.5 (23 Sep 2018 09:15)  HR: 86 (24 Sep 2018 08:30) (81 - 100)  BP: 125/80 (24 Sep 2018 08:30) (98/61 - 125/80)  BP(mean): 88 (24 Sep 2018 08:30) (88 - 88)  RR: 18 (24 Sep 2018 08:30) (14 - 18)  SpO2: 97% (24 Sep 2018 08:30) (95% - 100%)  I&O's Summary    23 Sep 2018 07:01  -  24 Sep 2018 07:00  --------------------------------------------------------  IN: 250 mL / OUT: 600 mL / NET: -350 mL        [X ] NO APPARENT ANESTHESIA COMPLICATIONS      Comments:

## 2018-09-25 ENCOUNTER — TRANSCRIPTION ENCOUNTER (OUTPATIENT)
Age: 68
End: 2018-09-25

## 2018-09-25 VITALS
DIASTOLIC BLOOD PRESSURE: 77 MMHG | OXYGEN SATURATION: 95 % | SYSTOLIC BLOOD PRESSURE: 99 MMHG | RESPIRATION RATE: 18 BRPM | HEART RATE: 82 BPM | TEMPERATURE: 97 F

## 2018-09-25 LAB
APTT BLD: 26.2 SEC — LOW (ref 27.5–37.4)
BUN SERPL-MCNC: 13 MG/DL — SIGNIFICANT CHANGE UP (ref 7–23)
CALCIUM SERPL-MCNC: 8.6 MG/DL — SIGNIFICANT CHANGE UP (ref 8.4–10.5)
CHLORIDE SERPL-SCNC: 97 MMOL/L — LOW (ref 98–107)
CO2 SERPL-SCNC: 26 MMOL/L — SIGNIFICANT CHANGE UP (ref 22–31)
CREAT SERPL-MCNC: 0.91 MG/DL — SIGNIFICANT CHANGE UP (ref 0.5–1.3)
GLUCOSE SERPL-MCNC: 122 MG/DL — HIGH (ref 70–99)
HCT VFR BLD CALC: 36.2 % — LOW (ref 39–50)
HGB BLD-MCNC: 12.2 G/DL — LOW (ref 13–17)
INR BLD: 1.27 — HIGH (ref 0.88–1.17)
MAGNESIUM SERPL-MCNC: 2.2 MG/DL — SIGNIFICANT CHANGE UP (ref 1.6–2.6)
MCHC RBC-ENTMCNC: 29 PG — SIGNIFICANT CHANGE UP (ref 27–34)
MCHC RBC-ENTMCNC: 33.7 % — SIGNIFICANT CHANGE UP (ref 32–36)
MCV RBC AUTO: 86.2 FL — SIGNIFICANT CHANGE UP (ref 80–100)
NRBC # FLD: 0 — SIGNIFICANT CHANGE UP
PHOSPHATE SERPL-MCNC: 2 MG/DL — LOW (ref 2.5–4.5)
PLATELET # BLD AUTO: 195 K/UL — SIGNIFICANT CHANGE UP (ref 150–400)
PMV BLD: 10.8 FL — SIGNIFICANT CHANGE UP (ref 7–13)
POTASSIUM SERPL-MCNC: 4.1 MMOL/L — SIGNIFICANT CHANGE UP (ref 3.5–5.3)
POTASSIUM SERPL-SCNC: 4.1 MMOL/L — SIGNIFICANT CHANGE UP (ref 3.5–5.3)
PROTHROM AB SERPL-ACNC: 14.2 SEC — HIGH (ref 9.8–13.1)
RBC # BLD: 4.2 M/UL — SIGNIFICANT CHANGE UP (ref 4.2–5.8)
RBC # FLD: 13.2 % — SIGNIFICANT CHANGE UP (ref 10.3–14.5)
SODIUM SERPL-SCNC: 134 MMOL/L — LOW (ref 135–145)
WBC # BLD: 14.05 K/UL — HIGH (ref 3.8–10.5)
WBC # FLD AUTO: 14.05 K/UL — HIGH (ref 3.8–10.5)

## 2018-09-25 RX ORDER — CARVEDILOL PHOSPHATE 80 MG/1
1 CAPSULE, EXTENDED RELEASE ORAL
Qty: 10 | Refills: 0 | OUTPATIENT
Start: 2018-09-25 | End: 2018-09-29

## 2018-09-25 RX ORDER — LISINOPRIL 5 MG/1
0.5 TABLET ORAL
Qty: 5 | Refills: 0 | DISCHARGE
Start: 2018-09-25 | End: 2018-09-29

## 2018-09-25 RX ORDER — LISINOPRIL 2.5 MG/1
1 TABLET ORAL
Qty: 5 | Refills: 0 | OUTPATIENT
Start: 2018-09-25 | End: 2018-09-29

## 2018-09-25 RX ORDER — LISINOPRIL 2.5 MG/1
1 TABLET ORAL
Qty: 0 | Refills: 0 | COMMUNITY

## 2018-09-25 RX ORDER — SIMVASTATIN 20 MG/1
1 TABLET, FILM COATED ORAL
Qty: 5 | Refills: 0
Start: 2018-09-25 | End: 2018-09-29

## 2018-09-25 RX ORDER — CARVEDILOL PHOSPHATE 80 MG/1
25 CAPSULE, EXTENDED RELEASE ORAL EVERY 12 HOURS
Qty: 0 | Refills: 0 | Status: DISCONTINUED | OUTPATIENT
Start: 2018-09-25 | End: 2018-09-25

## 2018-09-25 RX ORDER — TAMSULOSIN HYDROCHLORIDE 0.4 MG/1
1 CAPSULE ORAL
Qty: 5 | Refills: 0 | OUTPATIENT
Start: 2018-09-25 | End: 2018-09-29

## 2018-09-25 RX ORDER — WARFARIN SODIUM 2.5 MG/1
1 TABLET ORAL
Qty: 5 | Refills: 0
Start: 2018-09-25 | End: 2018-09-29

## 2018-09-25 RX ORDER — OXYCODONE HYDROCHLORIDE 5 MG/1
1 TABLET ORAL
Qty: 12 | Refills: 0 | OUTPATIENT
Start: 2018-09-25 | End: 2018-09-27

## 2018-09-25 RX ORDER — ACETAMINOPHEN 500 MG
2 TABLET ORAL
Qty: 0 | Refills: 0 | COMMUNITY
Start: 2018-09-25

## 2018-09-25 RX ORDER — LISINOPRIL 2.5 MG/1
5 TABLET ORAL DAILY
Qty: 0 | Refills: 0 | Status: DISCONTINUED | OUTPATIENT
Start: 2018-09-25 | End: 2018-09-25

## 2018-09-25 RX ORDER — WARFARIN SODIUM 2.5 MG/1
1 TABLET ORAL
Qty: 5 | Refills: 0 | OUTPATIENT
Start: 2018-09-25 | End: 2018-09-29

## 2018-09-25 RX ORDER — WARFARIN SODIUM 2.5 MG/1
5 TABLET ORAL ONCE
Qty: 0 | Refills: 0 | Status: DISCONTINUED | OUTPATIENT
Start: 2018-09-25 | End: 2018-09-25

## 2018-09-25 RX ORDER — TAMSULOSIN HYDROCHLORIDE 0.4 MG/1
1 CAPSULE ORAL
Qty: 5 | Refills: 0
Start: 2018-09-25 | End: 2018-09-29

## 2018-09-25 RX ORDER — SIMVASTATIN 20 MG/1
1 TABLET, FILM COATED ORAL
Qty: 5 | Refills: 0 | OUTPATIENT
Start: 2018-09-25 | End: 2018-09-29

## 2018-09-25 RX ORDER — CARVEDILOL PHOSPHATE 80 MG/1
1 CAPSULE, EXTENDED RELEASE ORAL
Qty: 10 | Refills: 0
Start: 2018-09-25 | End: 2018-09-29

## 2018-09-25 RX ORDER — SIMVASTATIN 20 MG/1
1 TABLET, FILM COATED ORAL
Qty: 0 | Refills: 0 | COMMUNITY

## 2018-09-25 RX ORDER — CARVEDILOL PHOSPHATE 80 MG/1
1 CAPSULE, EXTENDED RELEASE ORAL
Qty: 0 | Refills: 0 | COMMUNITY

## 2018-09-25 RX ORDER — LISINOPRIL 2.5 MG/1
1 TABLET ORAL
Qty: 5 | Refills: 0
Start: 2018-09-25 | End: 2018-09-29

## 2018-09-25 RX ORDER — ASPIRIN/CALCIUM CARB/MAGNESIUM 324 MG
1 TABLET ORAL
Qty: 0 | Refills: 0 | COMMUNITY

## 2018-09-25 RX ADMIN — CARVEDILOL PHOSPHATE 25 MILLIGRAM(S): 80 CAPSULE, EXTENDED RELEASE ORAL at 10:08

## 2018-09-25 RX ADMIN — Medication 650 MILLIGRAM(S): at 10:13

## 2018-09-25 RX ADMIN — Medication 100 GRAM(S): at 10:08

## 2018-09-25 RX ADMIN — LISINOPRIL 5 MILLIGRAM(S): 2.5 TABLET ORAL at 10:08

## 2018-09-25 RX ADMIN — Medication 650 MILLIGRAM(S): at 10:45

## 2018-09-25 NOTE — DISCHARGE NOTE ADULT - HOSPITAL COURSE
68M h/o HFrEF (~35%), AICD on coumadin current INR of 2.33 presents with two days of right lower abdominal pain, nausea and subsequent CT imaging showing acute appendicitis w/concern for perforation, although without obvious abscess. Patient is focally tender in the right lower quadrant. Leukocytotic to 12. Routine recent stress test without significant findings relayed to patient. The patient is currently afebrile and non-toxic appearing. Denies chest pain, shortness of breath, dizziness or syncope.    Pt underwent Laparoscopic appendectomy on 9/23. Post operatively pt had ppm interrogated post procedure which showed Normal PPM / ICD function.  During hospital course patients diet was slowly advanced as tolerated.  At this time, pt is tolerating a regular diet, ambulating and voiding.  Pt has been deemed stable for discharge at this time.

## 2018-09-25 NOTE — PROGRESS NOTE ADULT - SUBJECTIVE AND OBJECTIVE BOX
SURGERY  PROGRESS NOTE:     Overnight Events:  -  no issues overnight  -  no n/v on LRD    OBJECTIVE:    Vital Signs Last 24 Hrs  T(C): 36.3 (25 Sep 2018 05:52), Max: 36.5 (24 Sep 2018 16:10)  T(F): 97.4 (25 Sep 2018 05:52), Max: 97.7 (24 Sep 2018 16:10)  HR: 81 (25 Sep 2018 05:52) (80 - 86)  BP: 113/69 (25 Sep 2018 05:52) (98/82 - 125/80)  BP(mean): 88 (24 Sep 2018 08:30) (88 - 88)  RR: 18 (25 Sep 2018 05:52) (18 - 18)  SpO2: 98% (25 Sep 2018 05:52) (96% - 98%)    I&O's Detail    24 Sep 2018 07:01  -  25 Sep 2018 07:00  --------------------------------------------------------  IN:    Oral Fluid: 250 mL  Total IN: 250 mL    OUT:    Voided: 1000 mL  Total OUT: 1000 mL    Total NET: -750 mL          LABS:                        15.5   11.89 )-----------( 197      ( 23 Sep 2018 09:55 )             47.0         134<L>  |  95<L>  |  14  ----------------------------<  126<H>  4.9   |  25  |  1.13    Ca    9.3      23 Sep 2018 09:55    TPro  7.6  /  Alb  3.9  /  TBili  3.4<H>  /  DBili  x   /  AST  31  /  ALT  19  /  AlkPhos  64  09-23    PT/INR - ( 25 Sep 2018 06:44 )   PT: 14.2 SEC;   INR: 1.27          PTT - ( 25 Sep 2018 06:44 )  PTT:26.2 SEC  Urinalysis Basic - ( 23 Sep 2018 09:55 )    Color: YELLOW / Appearance: CLEAR / S.028 / pH: 6.0  Gluc: NEGATIVE / Ketone: SMALL  / Bili: NEGATIVE / Urobili: TRACE   Blood: SMALL / Protein: 50 / Nitrite: NEGATIVE   Leuk Esterase: NEGATIVE / RBC: 3-5 / WBC 0-2   Sq Epi: OCC / Non Sq Epi: x / Bacteria: NEGATIVE      LIVER FUNCTIONS - ( 23 Sep 2018 09:55 )  Alb: 3.9 g/dL / Pro: 7.6 g/dL / ALK PHOS: 64 u/L / ALT: 19 u/L / AST: 31 u/L / GGT: x             EXAM:  Gen:       alert, in NAD  Lungs:       unlabored breathing  CV:       regular rate, rhythm  Abd:       nondistended, appropriately tender over surgical site                 incisions clean, dry, intact                 smith in place, set to gravity, functioning                NGT in place, set to LCWS, flushed and functioning                DAYA drain x _____ with ________________ output                ostomy in _______,  pink/viable                lap port sites with steri strips placed, with no drainage                midline laparotomy with staples in place, c/d/i                midline laparotomy with staples in place, packed in between with gauze                wound vac to ____________, functioning appropriately                 clementina drain in __________                penrose drain in ____________                healed surgical scars present: ________________  Ext:        no edema SURGERY  PROGRESS NOTE:     Overnight Events:  -  no issues overnight  -  no n/v on LRD    OBJECTIVE:    Vital Signs Last 24 Hrs  T(C): 36.3 (25 Sep 2018 05:52), Max: 36.5 (24 Sep 2018 16:10)  T(F): 97.4 (25 Sep 2018 05:52), Max: 97.7 (24 Sep 2018 16:10)  HR: 81 (25 Sep 2018 05:52) (80 - 86)  BP: 113/69 (25 Sep 2018 05:52) (98/82 - 125/80)  BP(mean): 88 (24 Sep 2018 08:30) (88 - 88)  RR: 18 (25 Sep 2018 05:52) (18 - 18)  SpO2: 98% (25 Sep 2018 05:52) (96% - 98%)    I&O's Detail    24 Sep 2018 07:01  -  25 Sep 2018 07:00  --------------------------------------------------------  IN:    Oral Fluid: 250 mL  Total IN: 250 mL    OUT:    Voided: 1000 mL  Total OUT: 1000 mL    Total NET: -750 mL          LABS:                        15.5   11.89 )-----------( 197      ( 23 Sep 2018 09:55 )             47.0         134<L>  |  95<L>  |  14  ----------------------------<  126<H>  4.9   |  25  |  1.13    Ca    9.3      23 Sep 2018 09:55    TPro  7.6  /  Alb  3.9  /  TBili  3.4<H>  /  DBili  x   /  AST  31  /  ALT  19  /  AlkPhos  64  09-23    PT/INR - ( 25 Sep 2018 06:44 )   PT: 14.2 SEC;   INR: 1.27          PTT - ( 25 Sep 2018 06:44 )  PTT:26.2 SEC  Urinalysis Basic - ( 23 Sep 2018 09:55 )    Color: YELLOW / Appearance: CLEAR / S.028 / pH: 6.0  Gluc: NEGATIVE / Ketone: SMALL  / Bili: NEGATIVE / Urobili: TRACE   Blood: SMALL / Protein: 50 / Nitrite: NEGATIVE   Leuk Esterase: NEGATIVE / RBC: 3-5 / WBC 0-2   Sq Epi: OCC / Non Sq Epi: x / Bacteria: NEGATIVE      LIVER FUNCTIONS - ( 23 Sep 2018 09:55 )  Alb: 3.9 g/dL / Pro: 7.6 g/dL / ALK PHOS: 64 u/L / ALT: 19 u/L / AST: 31 u/L / GGT: x             EXAM:  Gen:       alert, in NAD  Lungs:       unlabored breathing  CV:       regular rate, rhythm  Abd:       nondistended, appropriately tender over surgical site                 incisions clean, dry, intact  Ext:        no edema

## 2018-09-25 NOTE — DISCHARGE NOTE ADULT - CARE PLAN
Principal Discharge DX:	Appendicitis, unspecified appendicitis type  Goal:	s/p appendectomy  Assessment and plan of treatment:	WOUND CARE:  Please keep incisions clean and dry. Please do not Scrub or rub incisions. Do not use lotion or powder on incisions.   BATHING: You may shower and/or sponge bathe. You may use warm soapy water in the shower and rinse, pat dry.  ACTIVITY: No heavy lifting or straining. Otherwise, you may return to your usual level of physical activity. If you are taking narcotic pain medication DO NOT drive a car, operate machinery or make important decisions.  DIET: Return to your usual diet.  NOTIFY YOUR SURGEON IF: You have any bleeding that does not stop, any pus draining from your wound(s), increased pain at surgical site, any fever (over 100.4 F) persistent nausea/vomiting, or if your pain is not controlled on your discharge pain medications.  Please follow up with your primary care physician in 1-2 weeks regarding your hospitalization.  Please follow up with your surgeon,

## 2018-09-25 NOTE — DISCHARGE NOTE ADULT - PATIENT PORTAL LINK FT
You can access the [facility], offered by [facility], by registering with the following website: [email or web address]

## 2018-09-25 NOTE — DISCHARGE NOTE ADULT - CARE PROVIDER_API CALL
Devyn Ashraf), ColonRectal Surgery; Surgery  [address] [place] [address]  Phone: [phone]  Fax: [phone]

## 2018-09-25 NOTE — PROGRESS NOTE ADULT - ASSESSMENT
67 yo male with perforated appendicitis s/p appendectomy 9/23    -c/w LRD today  -c/w coumadin, f/u INR for goal  -Pain control  -Continue with IV Antibiotics    [facility] team surgery [id number]

## 2018-09-25 NOTE — DISCHARGE NOTE ADULT - PLAN OF CARE
s/p appendectomy WOUND CARE:  Please keep incisions clean and dry. Please do not Scrub or rub incisions. Do not use lotion or powder on incisions.   BATHING: You may shower and/or sponge bathe. You may use warm soapy water in the shower and rinse, pat dry.  ACTIVITY: No heavy lifting or straining. Otherwise, you may return to your usual level of physical activity. If you are taking narcotic pain medication DO NOT drive a car, operate machinery or make important decisions.  DIET: Return to your usual diet.  NOTIFY YOUR SURGEON IF: You have any bleeding that does not stop, any pus draining from your wound(s), increased pain at surgical site, any fever (over 100.4 F) persistent nausea/vomiting, or if your pain is not controlled on your discharge pain medications.  Please follow up with your primary care physician in 1-2 weeks regarding your hospitalization.  Please follow up with your surgeon,

## 2018-10-05 LAB — SURGICAL PATHOLOGY STUDY: SIGNIFICANT CHANGE UP

## 2018-10-29 ENCOUNTER — TRANSCRIPTION ENCOUNTER (OUTPATIENT)
Age: 68
End: 2018-10-29

## 2018-10-29 PROBLEM — I10 ESSENTIAL (PRIMARY) HYPERTENSION: Chronic | Status: ACTIVE | Noted: 2018-09-23

## 2018-11-09 ENCOUNTER — INPATIENT (INPATIENT)
Facility: HOSPITAL | Age: 68
LOS: 0 days | Discharge: HOME | DRG: 247 | End: 2018-11-10
Attending: INTERNAL MEDICINE | Admitting: INTERNAL MEDICINE
Payer: MEDICARE

## 2018-11-09 ENCOUNTER — TRANSCRIPTION ENCOUNTER (OUTPATIENT)
Age: 68
End: 2018-11-09

## 2018-11-09 VITALS
DIASTOLIC BLOOD PRESSURE: 69 MMHG | HEART RATE: 80 BPM | RESPIRATION RATE: 16 BRPM | TEMPERATURE: 98 F | HEIGHT: 65 IN | SYSTOLIC BLOOD PRESSURE: 116 MMHG | WEIGHT: 169.98 LBS | OXYGEN SATURATION: 99 %

## 2018-11-09 DIAGNOSIS — I25.9 CHRONIC ISCHEMIC HEART DISEASE, UNSPECIFIED: ICD-10-CM

## 2018-11-09 DIAGNOSIS — Z95.810 PRESENCE OF AUTOMATIC (IMPLANTABLE) CARDIAC DEFIBRILLATOR: Chronic | ICD-10-CM

## 2018-11-09 DIAGNOSIS — Z90.49 ACQUIRED ABSENCE OF OTHER SPECIFIED PARTS OF DIGESTIVE TRACT: Chronic | ICD-10-CM

## 2018-11-09 DIAGNOSIS — S82.90XA UNSPECIFIED FRACTURE OF UNSPECIFIED LOWER LEG, INITIAL ENCOUNTER FOR CLOSED FRACTURE: Chronic | ICD-10-CM

## 2018-11-09 DIAGNOSIS — Z95.5 PRESENCE OF CORONARY ANGIOPLASTY IMPLANT AND GRAFT: Chronic | ICD-10-CM

## 2018-11-09 LAB
ANION GAP SERPL CALC-SCNC: 10 MMOL/L — SIGNIFICANT CHANGE UP (ref 5–17)
APTT BLD: 37.3 SEC — HIGH (ref 27.5–36.3)
BUN SERPL-MCNC: 14 MG/DL — SIGNIFICANT CHANGE UP (ref 7–23)
CALCIUM SERPL-MCNC: 9.6 MG/DL — SIGNIFICANT CHANGE UP (ref 8.4–10.5)
CHLORIDE SERPL-SCNC: 101 MMOL/L — SIGNIFICANT CHANGE UP (ref 96–108)
CO2 SERPL-SCNC: 28 MMOL/L — SIGNIFICANT CHANGE UP (ref 22–31)
CREAT SERPL-MCNC: 1.04 MG/DL — SIGNIFICANT CHANGE UP (ref 0.5–1.3)
GLUCOSE SERPL-MCNC: 114 MG/DL — HIGH (ref 70–99)
HCT VFR BLD CALC: 46.3 % — SIGNIFICANT CHANGE UP (ref 39–50)
HGB BLD-MCNC: 15.5 G/DL — SIGNIFICANT CHANGE UP (ref 13–17)
INR BLD: 1.32 RATIO — HIGH (ref 0.88–1.16)
MCHC RBC-ENTMCNC: 29.4 PG — SIGNIFICANT CHANGE UP (ref 27–34)
MCHC RBC-ENTMCNC: 33.4 GM/DL — SIGNIFICANT CHANGE UP (ref 32–36)
MCV RBC AUTO: 88 FL — SIGNIFICANT CHANGE UP (ref 80–100)
PLATELET # BLD AUTO: 203 K/UL — SIGNIFICANT CHANGE UP (ref 150–400)
POTASSIUM SERPL-MCNC: 4.6 MMOL/L — SIGNIFICANT CHANGE UP (ref 3.5–5.3)
POTASSIUM SERPL-SCNC: 4.6 MMOL/L — SIGNIFICANT CHANGE UP (ref 3.5–5.3)
PROTHROM AB SERPL-ACNC: 15.2 SEC — HIGH (ref 10–12.9)
RBC # BLD: 5.26 M/UL — SIGNIFICANT CHANGE UP (ref 4.2–5.8)
RBC # FLD: 13.6 % — SIGNIFICANT CHANGE UP (ref 10.3–14.5)
SODIUM SERPL-SCNC: 139 MMOL/L — SIGNIFICANT CHANGE UP (ref 135–145)
WBC # BLD: 4.4 K/UL — SIGNIFICANT CHANGE UP (ref 3.8–10.5)
WBC # FLD AUTO: 4.4 K/UL — SIGNIFICANT CHANGE UP (ref 3.8–10.5)

## 2018-11-09 PROCEDURE — 93458 L HRT ARTERY/VENTRICLE ANGIO: CPT | Mod: 26,59,GC

## 2018-11-09 PROCEDURE — 93010 ELECTROCARDIOGRAM REPORT: CPT

## 2018-11-09 PROCEDURE — 93010 ELECTROCARDIOGRAM REPORT: CPT | Mod: 77

## 2018-11-09 PROCEDURE — 92928 PRQ TCAT PLMT NTRAC ST 1 LES: CPT | Mod: RC,GC

## 2018-11-09 PROCEDURE — 99152 MOD SED SAME PHYS/QHP 5/>YRS: CPT | Mod: GC

## 2018-11-09 RX ORDER — SIMVASTATIN 20 MG/1
20 TABLET, FILM COATED ORAL AT BEDTIME
Qty: 0 | Refills: 0 | Status: DISCONTINUED | OUTPATIENT
Start: 2018-11-09 | End: 2018-11-10

## 2018-11-09 RX ORDER — CHOLECALCIFEROL (VITAMIN D3) 125 MCG
2000 CAPSULE ORAL DAILY
Qty: 0 | Refills: 0 | Status: DISCONTINUED | OUTPATIENT
Start: 2018-11-09 | End: 2018-11-10

## 2018-11-09 RX ORDER — TAMSULOSIN HYDROCHLORIDE 0.4 MG/1
0.4 CAPSULE ORAL AT BEDTIME
Qty: 0 | Refills: 0 | Status: DISCONTINUED | OUTPATIENT
Start: 2018-11-09 | End: 2018-11-10

## 2018-11-09 RX ORDER — PREGABALIN 225 MG/1
1000 CAPSULE ORAL DAILY
Qty: 0 | Refills: 0 | Status: DISCONTINUED | OUTPATIENT
Start: 2018-11-09 | End: 2018-11-10

## 2018-11-09 RX ORDER — ASPIRIN/CALCIUM CARB/MAGNESIUM 324 MG
81 TABLET ORAL DAILY
Qty: 0 | Refills: 0 | Status: DISCONTINUED | OUTPATIENT
Start: 2018-11-10 | End: 2018-11-10

## 2018-11-09 RX ORDER — LISINOPRIL 2.5 MG/1
5 TABLET ORAL DAILY
Qty: 0 | Refills: 0 | Status: DISCONTINUED | OUTPATIENT
Start: 2018-11-09 | End: 2018-11-10

## 2018-11-09 RX ORDER — CARVEDILOL PHOSPHATE 80 MG/1
25 CAPSULE, EXTENDED RELEASE ORAL EVERY 12 HOURS
Qty: 0 | Refills: 0 | Status: DISCONTINUED | OUTPATIENT
Start: 2018-11-09 | End: 2018-11-10

## 2018-11-09 RX ORDER — CLOPIDOGREL BISULFATE 75 MG/1
75 TABLET, FILM COATED ORAL DAILY
Qty: 0 | Refills: 0 | Status: DISCONTINUED | OUTPATIENT
Start: 2018-11-10 | End: 2018-11-10

## 2018-11-09 RX ORDER — WARFARIN SODIUM 2.5 MG/1
5 TABLET ORAL ONCE
Qty: 0 | Refills: 0 | Status: COMPLETED | OUTPATIENT
Start: 2018-11-09 | End: 2018-11-09

## 2018-11-09 RX ADMIN — CARVEDILOL PHOSPHATE 25 MILLIGRAM(S): 80 CAPSULE, EXTENDED RELEASE ORAL at 18:29

## 2018-11-09 RX ADMIN — WARFARIN SODIUM 5 MILLIGRAM(S): 2.5 TABLET ORAL at 21:52

## 2018-11-09 RX ADMIN — SIMVASTATIN 20 MILLIGRAM(S): 20 TABLET, FILM COATED ORAL at 21:52

## 2018-11-09 RX ADMIN — TAMSULOSIN HYDROCHLORIDE 0.4 MILLIGRAM(S): 0.4 CAPSULE ORAL at 21:52

## 2018-11-09 NOTE — H&P CARDIOLOGY - PSH
AICD (automatic cardioverter/defibrillator) present    History of cholecystectomy    History of coronary artery stent placement  in 2008 x 2 stents  Tibia/fibula fracture  left

## 2018-11-09 NOTE — CHART NOTE - NSCHARTNOTEFT_GEN_A_CORE
Patient underwent a PCI procedure and is being admitted as they are at increased risk for major adverse cardiac and vascular events if discharged due to the following high risk characteristics:      Pre- Procedural Clinical Criteria  -Unstable angina

## 2018-11-09 NOTE — H&P CARDIOLOGY - PMH
Atrial fibrillation    Benign prostatic hypertrophy    CAD (coronary artery disease)    CHF (congestive heart failure)    Congestive heart failure    Dyslipidemia    HTN (hypertension)    Hypertension

## 2018-11-09 NOTE — H&P CARDIOLOGY - HISTORY OF PRESENT ILLNESS
69 y/o male with pmh of HTN, HLD, CAD s/p PCI/DAPHNIE mLAD and OM 3/2008, HFrEF (35%) s/p Medtronic ICD Model #BRGI5W4 6/2014, AF on coumadin (last dose 11/5), Appendicitis s/p appendectomy 9/2018 at [facility], BPH followed by Dr. Gaitan, Cardiologist for routine followup with abnormal stress test revealing LVEF 43% wit basal to mid inferoseptum and basal to mid inferior wall hypokinesis.  Pt denies cp, sob or palpitations and presents for cardiac cath for further evaluation.

## 2018-11-10 VITALS
HEART RATE: 87 BPM | RESPIRATION RATE: 20 BRPM | OXYGEN SATURATION: 98 % | DIASTOLIC BLOOD PRESSURE: 78 MMHG | SYSTOLIC BLOOD PRESSURE: 102 MMHG | TEMPERATURE: 98 F

## 2018-11-10 LAB
ANION GAP SERPL CALC-SCNC: 12 MMOL/L — SIGNIFICANT CHANGE UP (ref 5–17)
APTT BLD: 29 SEC — SIGNIFICANT CHANGE UP (ref 27.5–36.3)
BUN SERPL-MCNC: 15 MG/DL — SIGNIFICANT CHANGE UP (ref 7–23)
CALCIUM SERPL-MCNC: 9.2 MG/DL — SIGNIFICANT CHANGE UP (ref 8.4–10.5)
CHLORIDE SERPL-SCNC: 101 MMOL/L — SIGNIFICANT CHANGE UP (ref 96–108)
CO2 SERPL-SCNC: 24 MMOL/L — SIGNIFICANT CHANGE UP (ref 22–31)
CREAT SERPL-MCNC: 1.07 MG/DL — SIGNIFICANT CHANGE UP (ref 0.5–1.3)
GLUCOSE SERPL-MCNC: 127 MG/DL — HIGH (ref 70–99)
HCT VFR BLD CALC: 45.9 % — SIGNIFICANT CHANGE UP (ref 39–50)
HGB BLD-MCNC: 15.2 G/DL — SIGNIFICANT CHANGE UP (ref 13–17)
INR BLD: 1.2 RATIO — HIGH (ref 0.88–1.16)
MCHC RBC-ENTMCNC: 29 PG — SIGNIFICANT CHANGE UP (ref 27–34)
MCHC RBC-ENTMCNC: 33.1 GM/DL — SIGNIFICANT CHANGE UP (ref 32–36)
MCV RBC AUTO: 87.5 FL — SIGNIFICANT CHANGE UP (ref 80–100)
PLATELET # BLD AUTO: 195 K/UL — SIGNIFICANT CHANGE UP (ref 150–400)
POTASSIUM SERPL-MCNC: 4.2 MMOL/L — SIGNIFICANT CHANGE UP (ref 3.5–5.3)
POTASSIUM SERPL-SCNC: 4.2 MMOL/L — SIGNIFICANT CHANGE UP (ref 3.5–5.3)
PROTHROM AB SERPL-ACNC: 13.7 SEC — HIGH (ref 10–12.9)
RBC # BLD: 5.25 M/UL — SIGNIFICANT CHANGE UP (ref 4.2–5.8)
RBC # FLD: 13.7 % — SIGNIFICANT CHANGE UP (ref 10.3–14.5)
SODIUM SERPL-SCNC: 137 MMOL/L — SIGNIFICANT CHANGE UP (ref 135–145)
WBC # BLD: 5 K/UL — SIGNIFICANT CHANGE UP (ref 3.8–10.5)
WBC # FLD AUTO: 5 K/UL — SIGNIFICANT CHANGE UP (ref 3.8–10.5)

## 2018-11-10 PROCEDURE — 93458 L HRT ARTERY/VENTRICLE ANGIO: CPT | Mod: 59

## 2018-11-10 PROCEDURE — C1874: CPT

## 2018-11-10 PROCEDURE — 80048 BASIC METABOLIC PNL TOTAL CA: CPT

## 2018-11-10 PROCEDURE — C1769: CPT

## 2018-11-10 PROCEDURE — C9600: CPT | Mod: RC

## 2018-11-10 PROCEDURE — C1887: CPT

## 2018-11-10 PROCEDURE — 85610 PROTHROMBIN TIME: CPT

## 2018-11-10 PROCEDURE — 85730 THROMBOPLASTIN TIME PARTIAL: CPT

## 2018-11-10 PROCEDURE — 99153 MOD SED SAME PHYS/QHP EA: CPT

## 2018-11-10 PROCEDURE — 99152 MOD SED SAME PHYS/QHP 5/>YRS: CPT

## 2018-11-10 PROCEDURE — 93005 ELECTROCARDIOGRAM TRACING: CPT

## 2018-11-10 PROCEDURE — 85027 COMPLETE CBC AUTOMATED: CPT

## 2018-11-10 PROCEDURE — C1894: CPT

## 2018-11-10 PROCEDURE — C1725: CPT

## 2018-11-10 RX ORDER — CLOPIDOGREL BISULFATE 75 MG/1
1 TABLET, FILM COATED ORAL
Qty: 30 | Refills: 11
Start: 2018-11-10 | End: 2019-11-04

## 2018-11-10 RX ADMIN — CARVEDILOL PHOSPHATE 25 MILLIGRAM(S): 80 CAPSULE, EXTENDED RELEASE ORAL at 05:40

## 2018-11-10 RX ADMIN — LISINOPRIL 5 MILLIGRAM(S): 2.5 TABLET ORAL at 05:40

## 2018-11-10 NOTE — DISCHARGE NOTE ADULT - MEDICATION SUMMARY - MEDICATIONS TO TAKE
I will START or STAY ON the medications listed below when I get home from the hospital:    aspirin 81 mg oral tablet  -- 1 tab(s) by mouth once a day  -- Indication: For TO KEEP STENT OPEN     lisinopril 5 mg oral tablet  -- 1 tab(s) by mouth once a day  -- Indication: For high blood pressure    tamsulosin 0.4 mg oral capsule  -- 1 cap(s) by mouth once a day  -- Indication: For enlarged prostate    warfarin 5 mg oral tablet  -- 1 tab(s) by mouth once a day  - last dose 11/5/18  -- Indication: For afib    simvastatin 20 mg oral tablet  -- 1 tab(s) by mouth once a day (at bedtime)  -- Indication: For high cholesterol    clopidogrel 75 mg oral tablet  -- 1 tab(s) by mouth once a day  -- Indication: For TO KEEP STENT OPEN    carvedilol 25 mg oral tablet  -- 1 tab(s) by mouth every 12 hours  -- Indication: For high blood pressure     Vitamin D3 2000 intl units oral tablet  -- 1 tab(s) by mouth once a day  -- Indication: For supplement     Vitamin B12 1000 mcg oral tablet  -- 1 tab(s) by mouth once a day  -- Indication: For supplement

## 2018-11-10 NOTE — DISCHARGE NOTE ADULT - CARE PROVIDER_API CALL
Steffen Gaitan), Cardiovascular Disease; Nuclear Cardiology  [id number] [place] [address]  [address]  [place] [address]  Phone: [phone]  Fax: [phone]

## 2018-11-10 NOTE — DISCHARGE NOTE ADULT - CARE PLAN
Principal Discharge DX:	CAD (coronary artery disease)  Goal:	Pt remains chest pain free and understands post cath discharge instructions  Assessment and plan of treatment:	No heavy lifting or pushing/pulling with procedure arm for 2 weeks. No driving for 2 days. You may shower 24 hours following the procedure but avoid baths/swimming for 1 week. Check your wrist site for bleeding and/or swelling daily following procedure and call your doctor immediately if it occurs or if you experience increased pain at the site. Follow up with your cardiologist in 1-2 weeks. You may call [facility] if you have any questions/concerns regarding your procedure [phone].  Secondary Diagnosis:	Dyslipidemia  Goal:	Your LDL cholesterol will be less than 70mg/dL  Assessment and plan of treatment:	Continue with your cholesterol medications. Eat a heart healthy diet that is low in saturated fats and salt, and includes whole grains, fruits, vegetables and lean protein; exercise regularly (consult with your physician or cardiologist first); maintain a heart healthy weight. Continue to follow with your primary physician or cardiologist for treatment goals, continue medication, have liver function testing every 3 months as anti lipid medications can cause liver irritation. If you smoke - quit (A resource to help you stop smoking is the [facility] for Tobacco Control – phone number [phone].).  Secondary Diagnosis:	HTN (hypertension)  Goal:	Your blood pressure will be controlled.  Assessment and plan of treatment:	Continue with your blood pressure medications; eat a heart healthy diet with low salt diet; exercise regularly (consult with your physician or cardiologist first); maintain a heart healthy weight; if you smoke - quit (A resource to help you stop smoking is the [facility] Tobacco Control – phone number [phone].); include healthy ways to manage stress. Continue to follow with your primary care physician or cardiologist.

## 2018-11-10 NOTE — DISCHARGE NOTE ADULT - PATIENT PORTAL LINK FT
You can access the [facility] Patient Portal, offered by [facility], by registering with the following website: [email or web address]

## 2018-11-10 NOTE — DISCHARGE NOTE ADULT - PLAN OF CARE
Pt remains chest pain free and understands post cath discharge instructions No heavy lifting or pushing/pulling with procedure arm for 2 weeks. No driving for 2 days. You may shower 24 hours following the procedure but avoid baths/swimming for 1 week. Check your wrist site for bleeding and/or swelling daily following procedure and call your doctor immediately if it occurs or if you experience increased pain at the site. Follow up with your cardiologist in 1-2 weeks. You may call [facility] if you have any questions/concerns regarding your procedure [phone]. Your LDL cholesterol will be less than 70mg/dL Continue with your cholesterol medications. Eat a heart healthy diet that is low in saturated fats and salt, and includes whole grains, fruits, vegetables and lean protein; exercise regularly (consult with your physician or cardiologist first); maintain a heart healthy weight. Continue to follow with your primary physician or cardiologist for treatment goals, continue medication, have liver function testing every 3 months as anti lipid medications can cause liver irritation. If you smoke - quit (A resource to help you stop smoking is the [facility] Tobacco [facility] – phone number [phone].). Your blood pressure will be controlled. Continue with your blood pressure medications; eat a heart healthy diet with low salt diet; exercise regularly (consult with your physician or cardiologist first); maintain a heart healthy weight; if you smoke - quit (A resource to help you stop smoking is the [facility] Tobacco [facility] – phone number [phone].); include healthy ways to manage stress. Continue to follow with your primary care physician or cardiologist.

## 2018-11-10 NOTE — DISCHARGE NOTE ADULT - HOSPITAL COURSE
67 y/o male with pmh of HTN, HLD, CAD s/p PCI/DAPHNIE mLAD and OM 3/2008, HFrEF (35%) s/p Medtronic ICD Model #BHVG6I5 6/2014, AF on coumadin (last dose 11/5), Appendicitis s/p appendectomy 9/2018 at [facility], BPH followed by Dr. Gaitan, Cardiologist for routine followup with abnormal stress test revealing LVEF 43% wit basal to mid inferoseptum and basal to mid inferior wall hypokinesis.  Pt denies cp, sob or palpitations and presents for cardiac cath for further evaluation. Pt is now s/p cardiac cath DAPHNIE x 1 DRCA via right radial artery access. Pt tolerated the procedure well, site benign. Post-procedure discharge instructions discussed and questions addressed

## 2018-11-19 ENCOUNTER — NON-APPOINTMENT (OUTPATIENT)
Age: 68
End: 2018-11-19

## 2018-11-19 ENCOUNTER — APPOINTMENT (OUTPATIENT)
Dept: CARDIOLOGY | Facility: CLINIC | Age: 68
End: 2018-11-19
Payer: MEDICARE

## 2018-11-19 VITALS
HEIGHT: 65 IN | HEART RATE: 87 BPM | BODY MASS INDEX: 28.16 KG/M2 | TEMPERATURE: 98 F | OXYGEN SATURATION: 97 % | SYSTOLIC BLOOD PRESSURE: 127 MMHG | RESPIRATION RATE: 12 BRPM | DIASTOLIC BLOOD PRESSURE: 83 MMHG | WEIGHT: 169 LBS

## 2018-11-19 PROCEDURE — 99215 OFFICE O/P EST HI 40 MIN: CPT | Mod: 25

## 2018-11-19 PROCEDURE — 93000 ELECTROCARDIOGRAM COMPLETE: CPT

## 2018-11-20 LAB
ALBUMIN SERPL ELPH-MCNC: 4.1 G/DL
ALP BLD-CCNC: 59 U/L
ALT SERPL-CCNC: 15 U/L
ANION GAP SERPL CALC-SCNC: 11 MMOL/L
AST SERPL-CCNC: 21 U/L
BASOPHILS # BLD AUTO: 0.02 K/UL
BASOPHILS NFR BLD AUTO: 0.4 %
BILIRUB SERPL-MCNC: 0.5 MG/DL
BUN SERPL-MCNC: 16 MG/DL
CALCIUM SERPL-MCNC: 9.2 MG/DL
CHLORIDE SERPL-SCNC: 104 MMOL/L
CHOLEST SERPL-MCNC: 145 MG/DL
CHOLEST/HDLC SERPL: 3.8 RATIO
CO2 SERPL-SCNC: 25 MMOL/L
CREAT SERPL-MCNC: 0.95 MG/DL
EOSINOPHIL # BLD AUTO: 0.19 K/UL
EOSINOPHIL NFR BLD AUTO: 4.1 %
GLUCOSE SERPL-MCNC: 98 MG/DL
HCT VFR BLD CALC: 45.5 %
HDLC SERPL-MCNC: 38 MG/DL
HGB BLD-MCNC: 14.7 G/DL
IMM GRANULOCYTES NFR BLD AUTO: 0.4 %
INR PPP: 1.98 RATIO
LDLC SERPL CALC-MCNC: 67 MG/DL
LYMPHOCYTES # BLD AUTO: 1.4 K/UL
LYMPHOCYTES NFR BLD AUTO: 30 %
MAN DIFF?: NORMAL
MCHC RBC-ENTMCNC: 29.5 PG
MCHC RBC-ENTMCNC: 32.3 GM/DL
MCV RBC AUTO: 91.4 FL
MONOCYTES # BLD AUTO: 0.54 K/UL
MONOCYTES NFR BLD AUTO: 11.6 %
NEUTROPHILS # BLD AUTO: 2.5 K/UL
NEUTROPHILS NFR BLD AUTO: 53.5 %
PLATELET # BLD AUTO: 249 K/UL
POTASSIUM SERPL-SCNC: 4.3 MMOL/L
PROT SERPL-MCNC: 7.3 G/DL
PT BLD: 22.6 SEC
RBC # BLD: 4.98 M/UL
RBC # FLD: 14.7 %
SODIUM SERPL-SCNC: 140 MMOL/L
TRIGL SERPL-MCNC: 198 MG/DL
WBC # FLD AUTO: 4.67 K/UL

## 2018-11-24 ENCOUNTER — NON-APPOINTMENT (OUTPATIENT)
Age: 68
End: 2018-11-24

## 2018-11-25 NOTE — PHYSICAL EXAM
[General Appearance - Well Developed] : well developed [Normal Appearance] : normal appearance [Well Groomed] : well groomed [General Appearance - Well Nourished] : well nourished [No Deformities] : no deformities [General Appearance - In No Acute Distress] : no acute distress [Normal Conjunctiva] : the conjunctiva exhibited no abnormalities [Normal Oral Mucosa] : normal oral mucosa [No Oral Pallor] : no oral pallor [No Oral Cyanosis] : no oral cyanosis [Normal Jugular Venous A Waves Present] : normal jugular venous A waves present [Normal Jugular Venous V Waves Present] : normal jugular venous V waves present [No Jugular Venous Carrillo A Waves] : no jugular venous carrillo A waves [Respiration, Rhythm And Depth] : normal respiratory rhythm and effort [Exaggerated Use Of Accessory Muscles For Inspiration] : no accessory muscle use [Auscultation Breath Sounds / Voice Sounds] : lungs were clear to auscultation bilaterally [Bowel Sounds] : normal bowel sounds [Abdomen Soft] : soft [Abdomen Tenderness] : non-tender [Abnormal Walk] : normal gait [Nail Clubbing] : no clubbing of the fingernails [Cyanosis, Localized] : no localized cyanosis [Petechial Hemorrhages (___cm)] : no petechial hemorrhages [Skin Color & Pigmentation] : normal skin color and pigmentation [] : no rash [Skin Lesions] : no skin lesions [No Skin Ulcers] : no skin ulcer [Oriented To Time, Place, And Person] : oriented to person, place, and time [Affect] : the affect was normal [Mood] : the mood was normal [No Anxiety] : not feeling anxious [Normal Rate] : normal [Rhythm Regular] : regular [Normal S1] : normal S1 [Normal S2] : normal S2 [No Murmur] : no murmurs heard [No Pitting Edema] : no pitting edema present [2+] : right 2+ [FreeTextEntry1] : Extraocular muscles intact.  Anicteric sclerae. [S3] : no S3 [Right Carotid Bruit] : no bruit heard over the right carotid [Left Carotid Bruit] : no bruit heard over the left carotid [Bruit] : no bruit heard

## 2018-12-06 ENCOUNTER — MEDICATION RENEWAL (OUTPATIENT)
Age: 68
End: 2018-12-06

## 2019-01-05 ENCOUNTER — APPOINTMENT (OUTPATIENT)
Dept: CARDIOLOGY | Facility: CLINIC | Age: 69
End: 2019-01-05
Payer: MEDICARE

## 2019-01-05 ENCOUNTER — NON-APPOINTMENT (OUTPATIENT)
Age: 69
End: 2019-01-05

## 2019-01-05 VITALS
WEIGHT: 162 LBS | DIASTOLIC BLOOD PRESSURE: 72 MMHG | HEIGHT: 65 IN | TEMPERATURE: 97.9 F | BODY MASS INDEX: 26.99 KG/M2 | OXYGEN SATURATION: 98 % | RESPIRATION RATE: 12 BRPM | HEART RATE: 76 BPM | SYSTOLIC BLOOD PRESSURE: 106 MMHG

## 2019-01-05 LAB — INR PPP: 1.9 RATIO

## 2019-01-05 PROCEDURE — 93283 PRGRMG EVAL IMPLANTABLE DFB: CPT

## 2019-01-05 PROCEDURE — 99215 OFFICE O/P EST HI 40 MIN: CPT | Mod: 25

## 2019-01-05 PROCEDURE — 85610 PROTHROMBIN TIME: CPT | Mod: QW

## 2019-01-05 PROCEDURE — 93290 INTERROG DEV EVAL ICPMS IP: CPT | Mod: 26

## 2019-01-05 NOTE — PHYSICAL EXAM
[General Appearance - Well Developed] : well developed [Normal Appearance] : normal appearance [Well Groomed] : well groomed [General Appearance - Well Nourished] : well nourished [No Deformities] : no deformities [General Appearance - In No Acute Distress] : no acute distress [Normal Conjunctiva] : the conjunctiva exhibited no abnormalities [Normal Oral Mucosa] : normal oral mucosa [No Oral Pallor] : no oral pallor [No Oral Cyanosis] : no oral cyanosis [Normal Jugular Venous A Waves Present] : normal jugular venous A waves present [Normal Jugular Venous V Waves Present] : normal jugular venous V waves present [No Jugular Venous Carrillo A Waves] : no jugular venous carrillo A waves [Respiration, Rhythm And Depth] : normal respiratory rhythm and effort [Exaggerated Use Of Accessory Muscles For Inspiration] : no accessory muscle use [Auscultation Breath Sounds / Voice Sounds] : lungs were clear to auscultation bilaterally [Bowel Sounds] : normal bowel sounds [Abdomen Soft] : soft [Abdomen Tenderness] : non-tender [Abnormal Walk] : normal gait [Nail Clubbing] : no clubbing of the fingernails [Cyanosis, Localized] : no localized cyanosis [Petechial Hemorrhages (___cm)] : no petechial hemorrhages [Skin Color & Pigmentation] : normal skin color and pigmentation [] : no rash [Skin Lesions] : no skin lesions [No Skin Ulcers] : no skin ulcer [Oriented To Time, Place, And Person] : oriented to person, place, and time [Affect] : the affect was normal [Mood] : the mood was normal [No Anxiety] : not feeling anxious [Normal Rate] : normal [Rhythm Regular] : regular [Normal S1] : normal S1 [Normal S2] : normal S2 [No Murmur] : no murmurs heard [No Pitting Edema] : no pitting edema present [FreeTextEntry1] : Extraocular muscles intact.  Anicteric sclerae. [S3] : no S3 [Right Carotid Bruit] : no bruit heard over the right carotid [Left Carotid Bruit] : no bruit heard over the left carotid [Bruit] : no bruit heard

## 2019-02-26 NOTE — H&P CARDIOLOGY - ADDITIONAL PE
23mo male pmhx of bronchiolitis in past also with hx of eczema and food allergies per father, and family hx of  atopy now bib parents w co diff breathing since last night. mom reports cough and wheeze since sunday. + sick contact at home. mom gave albuterol nebs last night x 2 and po albuterol this am which she states did not help very much.   no fever at home but fever noted to be 100.4 in triage.   PE bronchiolitis resp score 9. pt with rr 70 diffuse intercostal retractions, suprasternal retractions, nasal flaring and belly breathing.   lungs with diffuse insp and exp wheeze throughout but limited exam as pt screaming.   imp/ plan - discussed bronchiolitis vs RAD with parents as pt has hx of atopy but will trial albuterol/ atrovent neb and reassess. If limited improvement will place on pressure (nasal cpap/ imv) Mallampati - 2

## 2019-03-10 ENCOUNTER — RX RENEWAL (OUTPATIENT)
Age: 69
End: 2019-03-10

## 2019-03-17 ENCOUNTER — TRANSCRIPTION ENCOUNTER (OUTPATIENT)
Age: 69
End: 2019-03-17

## 2019-06-28 ENCOUNTER — APPOINTMENT (OUTPATIENT)
Dept: SURGERY | Facility: CLINIC | Age: 69
End: 2019-06-28
Payer: MEDICARE

## 2019-06-28 VITALS
WEIGHT: 170 LBS | RESPIRATION RATE: 15 BRPM | SYSTOLIC BLOOD PRESSURE: 116 MMHG | BODY MASS INDEX: 28.32 KG/M2 | DIASTOLIC BLOOD PRESSURE: 79 MMHG | TEMPERATURE: 98.2 F | HEIGHT: 65 IN | HEART RATE: 71 BPM

## 2019-06-28 DIAGNOSIS — Z82.49 FAMILY HISTORY OF ISCHEMIC HEART DISEASE AND OTHER DISEASES OF THE CIRCULATORY SYSTEM: ICD-10-CM

## 2019-06-28 PROCEDURE — 99215 OFFICE O/P EST HI 40 MIN: CPT

## 2019-06-28 PROCEDURE — 99245 OFF/OP CONSLTJ NEW/EST HI 55: CPT

## 2019-06-28 PROCEDURE — 99205 OFFICE O/P NEW HI 60 MIN: CPT

## 2019-06-28 RX ORDER — CLOPIDOGREL BISULFATE 75 MG/1
75 TABLET, FILM COATED ORAL
Qty: 90 | Refills: 0 | Status: DISCONTINUED | COMMUNITY
Start: 2018-11-10 | End: 2019-06-28

## 2019-06-28 NOTE — PHYSICAL EXAM
[Normal Thyroid] : the thyroid was normal [Normal Rate and Rhythm] : normal rate and rhythm [Abdominal Aorta] : Normal abdominal aorta [Respiratory Effort] : normal respiratory effort [Oriented to Place] : oriented to place [No Rash or Lesion] : No rash or lesion [Oriented to Person] : oriented to person [Calm] : calm [Oriented to Time] : oriented to time [de-identified] : Normocephalic, atraumatic [de-identified] : No palpable adenopathy [de-identified] : Soft, nondistended, nontender. No palpable mass or organomegaly. Well-healed right subcostal surgical scar. Right groin- no palpable hernia. Left groin-  moderately large slightly tender, bowel containing inguinal hernia, reducible with some manipulation. [de-identified] : In no distress [de-identified] : External genitalia normal [de-identified] : Normal gait; no deformity [de-identified] : No gross sensory or motor deficit [de-identified] : Normal mood and affect

## 2019-06-28 NOTE — PLAN
[FreeTextEntry1] : Advised elective left inguinal hernia repair in ambulatory OR. Discussed with patient nature of, indications for and risks/benefits of surgery. Patient to DC Coumadin 4 days preop and DC aspirin one week preoperative okay with cardiology.

## 2019-06-28 NOTE — ASSESSMENT
[FreeTextEntry1] : 68-year-old male on Coumadin and aspirin with history of A. fib, status post coronary stents now with enlarging, symptomatic left inguinal hernia.

## 2019-06-28 NOTE — HISTORY OF PRESENT ILLNESS
[de-identified] : Patient has been aware of bulge in the left groin for more than 5 years. Over time the bulge has increased somewhat in size and more recently patient has been having episodes of severe left groin pain. These episodes usually occur postprandially and during that time patient notes that the bulge is significantly larger. No other abdominal symptoms or change in bowel habits noted. [de-identified] : Shiva is a 67 y/o male here for evaluation of left inguinal hernia. CT from 9/25/18 demonstrated urinary bladder entering left inguinal hernia.

## 2019-08-09 ENCOUNTER — APPOINTMENT (OUTPATIENT)
Dept: CARDIOLOGY | Facility: CLINIC | Age: 69
End: 2019-08-09
Payer: MEDICARE

## 2019-08-09 ENCOUNTER — NON-APPOINTMENT (OUTPATIENT)
Age: 69
End: 2019-08-09

## 2019-08-09 VITALS
WEIGHT: 168 LBS | SYSTOLIC BLOOD PRESSURE: 117 MMHG | HEART RATE: 76 BPM | HEIGHT: 65 IN | BODY MASS INDEX: 27.99 KG/M2 | DIASTOLIC BLOOD PRESSURE: 77 MMHG | RESPIRATION RATE: 12 BRPM | OXYGEN SATURATION: 98 %

## 2019-08-09 LAB
INR PPP: 2.5 RATIO
POCT-PROTHROMBIN TIME: 29.7 SECS
QUALITY CONTROL: YES

## 2019-08-09 PROCEDURE — 85610 PROTHROMBIN TIME: CPT | Mod: QW

## 2019-08-09 PROCEDURE — 93290 INTERROG DEV EVAL ICPMS IP: CPT | Mod: 26

## 2019-08-09 PROCEDURE — 93000 ELECTROCARDIOGRAM COMPLETE: CPT | Mod: NC

## 2019-08-09 PROCEDURE — 93283 PRGRMG EVAL IMPLANTABLE DFB: CPT

## 2019-08-09 PROCEDURE — 99215 OFFICE O/P EST HI 40 MIN: CPT | Mod: 25

## 2019-08-09 NOTE — PHYSICAL EXAM
[General Appearance - Well Developed] : well developed [Normal Appearance] : normal appearance [Well Groomed] : well groomed [General Appearance - Well Nourished] : well nourished [No Deformities] : no deformities [General Appearance - In No Acute Distress] : no acute distress [Normal Conjunctiva] : the conjunctiva exhibited no abnormalities [FreeTextEntry1] : Extraocular muscles intact.  Anicteric sclerae. [Normal Oral Mucosa] : normal oral mucosa [No Oral Pallor] : no oral pallor [No Oral Cyanosis] : no oral cyanosis [Normal Jugular Venous A Waves Present] : normal jugular venous A waves present [Normal Jugular Venous V Waves Present] : normal jugular venous V waves present [No Jugular Venous Carrillo A Waves] : no jugular venous carrillo A waves [Respiration, Rhythm And Depth] : normal respiratory rhythm and effort [Exaggerated Use Of Accessory Muscles For Inspiration] : no accessory muscle use [Bowel Sounds] : normal bowel sounds [Auscultation Breath Sounds / Voice Sounds] : lungs were clear to auscultation bilaterally [Abdomen Soft] : soft [Abdomen Tenderness] : non-tender [Abnormal Walk] : normal gait [Nail Clubbing] : no clubbing of the fingernails [Cyanosis, Localized] : no localized cyanosis [Petechial Hemorrhages (___cm)] : no petechial hemorrhages [Skin Color & Pigmentation] : normal skin color and pigmentation [] : no rash [No Skin Ulcers] : no skin ulcer [Skin Lesions] : no skin lesions [Oriented To Time, Place, And Person] : oriented to person, place, and time [Affect] : the affect was normal [Mood] : the mood was normal [No Anxiety] : not feeling anxious [Normal Rate] : normal [Rhythm Regular] : regular [Normal S1] : normal S1 [Normal S2] : normal S2 [S3] : no S3 [No Murmur] : no murmurs heard [Right Carotid Bruit] : no bruit heard over the right carotid [Left Carotid Bruit] : no bruit heard over the left carotid [Bruit] : no bruit heard [No Pitting Edema] : no pitting edema present

## 2019-08-09 NOTE — REASON FOR VISIT
[Anticoagulation] : anticoagulation [Atrial Fibrillation] : atrial fibrillation [FreeTextEntry1] : risk stratification prior to left inguinal hernia repair

## 2019-08-19 ENCOUNTER — OUTPATIENT (OUTPATIENT)
Dept: OUTPATIENT SERVICES | Facility: HOSPITAL | Age: 69
LOS: 1 days | End: 2019-08-19
Payer: MEDICARE

## 2019-08-19 VITALS
HEART RATE: 76 BPM | WEIGHT: 164.02 LBS | TEMPERATURE: 98 F | OXYGEN SATURATION: 97 % | HEIGHT: 64 IN | SYSTOLIC BLOOD PRESSURE: 92 MMHG | DIASTOLIC BLOOD PRESSURE: 65 MMHG | RESPIRATION RATE: 18 BRPM

## 2019-08-19 DIAGNOSIS — Z71.89 OTHER SPECIFIED COUNSELING: ICD-10-CM

## 2019-08-19 DIAGNOSIS — Z95.5 PRESENCE OF CORONARY ANGIOPLASTY IMPLANT AND GRAFT: Chronic | ICD-10-CM

## 2019-08-19 DIAGNOSIS — Z90.49 ACQUIRED ABSENCE OF OTHER SPECIFIED PARTS OF DIGESTIVE TRACT: Chronic | ICD-10-CM

## 2019-08-19 DIAGNOSIS — I48.91 UNSPECIFIED ATRIAL FIBRILLATION: ICD-10-CM

## 2019-08-19 DIAGNOSIS — S82.90XA UNSPECIFIED FRACTURE OF UNSPECIFIED LOWER LEG, INITIAL ENCOUNTER FOR CLOSED FRACTURE: Chronic | ICD-10-CM

## 2019-08-19 DIAGNOSIS — K40.90 UNILATERAL INGUINAL HERNIA, WITHOUT OBSTRUCTION OR GANGRENE, NOT SPECIFIED AS RECURRENT: ICD-10-CM

## 2019-08-19 DIAGNOSIS — Z95.810 PRESENCE OF AUTOMATIC (IMPLANTABLE) CARDIAC DEFIBRILLATOR: Chronic | ICD-10-CM

## 2019-08-19 DIAGNOSIS — Z01.818 ENCOUNTER FOR OTHER PREPROCEDURAL EXAMINATION: ICD-10-CM

## 2019-08-19 PROCEDURE — 80048 BASIC METABOLIC PNL TOTAL CA: CPT

## 2019-08-19 PROCEDURE — 85027 COMPLETE CBC AUTOMATED: CPT

## 2019-08-19 PROCEDURE — G0463: CPT

## 2019-08-19 RX ORDER — PREGABALIN 225 MG/1
1 CAPSULE ORAL
Qty: 0 | Refills: 0 | DISCHARGE

## 2019-08-19 RX ORDER — CEFAZOLIN SODIUM 1 G
2000 VIAL (EA) INJECTION ONCE
Refills: 0 | Status: DISCONTINUED | OUTPATIENT
Start: 2019-08-26 | End: 2019-09-11

## 2019-08-19 NOTE — H&P PST ADULT - PRO INTERPRETER NEED 2
-PA completed  -Marked for urgent review  -Faxed to:    Attn: Medicare Appeals Dept   [employer]   [phone]   English

## 2019-08-19 NOTE — H&P PST ADULT - HISTORY OF PRESENT ILLNESS
68yr old male with unilateral inguinal hernia coming in for left inguinal hernia repair  with mesh. Pt has stents x2 Afib hx AICD LVEF 40-45%.

## 2019-08-19 NOTE — H&P PST ADULT - NSICDXPASTSURGICALHX_GEN_ALL_CORE_FT
PAST SURGICAL HISTORY:  AICD (automatic cardioverter/defibrillator) present battery changed 2014    History of appendectomy 2018    History of cholecystectomy 2014 complicated with septic shock    History of coronary artery stent placement in 2008 x 2 stents    Tibia/fibula fracture left 2004

## 2019-08-19 NOTE — H&P PST ADULT - NSICDXPASTMEDICALHX_GEN_ALL_CORE_FT
PAST MEDICAL HISTORY:  Arteriosclerotic heart disease (ASHD)     Atrial fibrillation     Benign prostatic hypertrophy     CAD (coronary artery disease)     CHF (congestive heart failure) 2008 Tx with stent and ICD    Dyslipidemia     History of pancreatitis     HTN (hypertension)

## 2019-08-19 NOTE — H&P PST ADULT - NSICDXPROBLEM_GEN_ALL_CORE_FT
PROBLEM DIAGNOSES  Problem: Unilateral inguinal hernia without obstruction or gangrene  Assessment and Plan:     Problem: Encounter for discussion regarding implantable cardioverter-defibrillator (ICD)  Assessment and Plan: Spoke to Dr. Urbano mathew to have inguinal hernia done in main OR. Pt had stent inserted 11/18. LVEF 40-45%    Problem: Afib  Assessment and Plan: Pt was told by cardio to hold warfarin for 3 days and continue aspirin

## 2019-08-25 ENCOUNTER — TRANSCRIPTION ENCOUNTER (OUTPATIENT)
Age: 69
End: 2019-08-25

## 2019-08-25 NOTE — PRE-ANESTHESIA EVALUATION ADULT - NSANTHPMHFT_GEN_ALL_CORE
67 y/o M hx of CAD s/p DAPHNIE x 2 and AICD (2018), Afib on Coumadin, HTN, HLD presents for left inguinal hernia repair with mesh.

## 2019-08-26 ENCOUNTER — OUTPATIENT (OUTPATIENT)
Dept: OUTPATIENT SERVICES | Facility: HOSPITAL | Age: 69
LOS: 1 days | End: 2019-08-26
Payer: MEDICARE

## 2019-08-26 ENCOUNTER — APPOINTMENT (OUTPATIENT)
Dept: SURGERY | Facility: HOSPITAL | Age: 69
End: 2019-08-26
Payer: MEDICARE

## 2019-08-26 VITALS
HEIGHT: 64 IN | TEMPERATURE: 98 F | HEART RATE: 69 BPM | SYSTOLIC BLOOD PRESSURE: 104 MMHG | RESPIRATION RATE: 18 BRPM | DIASTOLIC BLOOD PRESSURE: 69 MMHG | WEIGHT: 164.02 LBS | OXYGEN SATURATION: 99 %

## 2019-08-26 VITALS
DIASTOLIC BLOOD PRESSURE: 67 MMHG | OXYGEN SATURATION: 100 % | SYSTOLIC BLOOD PRESSURE: 102 MMHG | HEART RATE: 68 BPM | TEMPERATURE: 98 F | RESPIRATION RATE: 20 BRPM

## 2019-08-26 DIAGNOSIS — Z95.5 PRESENCE OF CORONARY ANGIOPLASTY IMPLANT AND GRAFT: Chronic | ICD-10-CM

## 2019-08-26 DIAGNOSIS — S82.90XA UNSPECIFIED FRACTURE OF UNSPECIFIED LOWER LEG, INITIAL ENCOUNTER FOR CLOSED FRACTURE: Chronic | ICD-10-CM

## 2019-08-26 DIAGNOSIS — K40.90 UNILATERAL INGUINAL HERNIA, WITHOUT OBSTRUCTION OR GANGRENE, NOT SPECIFIED AS RECURRENT: ICD-10-CM

## 2019-08-26 DIAGNOSIS — Z90.49 ACQUIRED ABSENCE OF OTHER SPECIFIED PARTS OF DIGESTIVE TRACT: Chronic | ICD-10-CM

## 2019-08-26 DIAGNOSIS — Z95.810 PRESENCE OF AUTOMATIC (IMPLANTABLE) CARDIAC DEFIBRILLATOR: Chronic | ICD-10-CM

## 2019-08-26 LAB
GLUCOSE BLDC GLUCOMTR-MCNC: 107 MG/DL — HIGH (ref 70–99)
INR BLD: 1.27 RATIO — HIGH (ref 0.88–1.16)
PROTHROM AB SERPL-ACNC: 14.6 SEC — HIGH (ref 10–12.9)

## 2019-08-26 PROCEDURE — 85610 PROTHROMBIN TIME: CPT

## 2019-08-26 PROCEDURE — 82962 GLUCOSE BLOOD TEST: CPT

## 2019-08-26 PROCEDURE — C1781: CPT

## 2019-08-26 PROCEDURE — C1889: CPT

## 2019-08-26 PROCEDURE — 49505 PRP I/HERN INIT REDUC >5 YR: CPT | Mod: LT

## 2019-08-26 RX ORDER — WARFARIN SODIUM 2.5 MG/1
1 TABLET ORAL
Qty: 5 | Refills: 0
Start: 2019-08-26 | End: 2019-08-30

## 2019-08-26 RX ORDER — FENTANYL CITRATE 50 UG/ML
50 INJECTION INTRAVENOUS ONCE
Refills: 0 | Status: DISCONTINUED | OUTPATIENT
Start: 2019-08-26 | End: 2019-08-26

## 2019-08-26 RX ORDER — ACETAMINOPHEN 500 MG
1000 TABLET ORAL ONCE
Refills: 0 | Status: COMPLETED | OUTPATIENT
Start: 2019-08-26 | End: 2019-08-26

## 2019-08-26 RX ORDER — CHLORHEXIDINE GLUCONATE 213 G/1000ML
1 SOLUTION TOPICAL DAILY
Refills: 0 | Status: DISCONTINUED | OUTPATIENT
Start: 2019-08-26 | End: 2019-08-26

## 2019-08-26 RX ORDER — CELECOXIB 200 MG/1
200 CAPSULE ORAL ONCE
Refills: 0 | Status: COMPLETED | OUTPATIENT
Start: 2019-08-26 | End: 2019-08-26

## 2019-08-26 RX ORDER — LIDOCAINE HCL 20 MG/ML
0.2 VIAL (ML) INJECTION ONCE
Refills: 0 | Status: DISCONTINUED | OUTPATIENT
Start: 2019-08-26 | End: 2019-08-26

## 2019-08-26 RX ORDER — FENTANYL CITRATE 50 UG/ML
25 INJECTION INTRAVENOUS
Refills: 0 | Status: DISCONTINUED | OUTPATIENT
Start: 2019-08-26 | End: 2019-08-26

## 2019-08-26 RX ORDER — ONDANSETRON 8 MG/1
4 TABLET, FILM COATED ORAL ONCE
Refills: 0 | Status: DISCONTINUED | OUTPATIENT
Start: 2019-08-26 | End: 2019-08-26

## 2019-08-26 RX ORDER — SODIUM CHLORIDE 9 MG/ML
3 INJECTION INTRAMUSCULAR; INTRAVENOUS; SUBCUTANEOUS EVERY 8 HOURS
Refills: 0 | Status: DISCONTINUED | OUTPATIENT
Start: 2019-08-26 | End: 2019-08-26

## 2019-08-26 RX ORDER — OXYCODONE AND ACETAMINOPHEN 5; 325 MG/1; MG/1
1 TABLET ORAL
Qty: 24 | Refills: 0
Start: 2019-08-26 | End: 2019-08-29

## 2019-08-26 RX ADMIN — CELECOXIB 200 MILLIGRAM(S): 200 CAPSULE ORAL at 07:29

## 2019-08-26 RX ADMIN — Medication 1000 MILLIGRAM(S): at 07:28

## 2019-08-26 NOTE — ASU DISCHARGE PLAN (ADULT/PEDIATRIC) - CARE PROVIDER_API CALL
Markos Tam)  Surgery  [address], [address]  [place], [place] [id number]  Phone: [phone]  Fax: [phone]  Follow Up Time:

## 2019-08-26 NOTE — ASU DISCHARGE PLAN (ADULT/PEDIATRIC) - CALL YOUR DOCTOR IF YOU HAVE ANY OF THE FOLLOWING:
Nausea and vomiting that does not stop/Unable to urinate/Bleeding that does not stop/Swelling that gets worse/Fever greater than (need to indicate Fahrenheit or Celsius)/Wound/Surgical Site with redness, or foul smelling discharge or pus/Pain not relieved by Medications

## 2019-09-05 PROBLEM — I50.9 HEART FAILURE, UNSPECIFIED: Chronic | Status: ACTIVE | Noted: 2018-09-23

## 2019-09-05 PROBLEM — I25.10 ATHEROSCLEROTIC HEART DISEASE OF NATIVE CORONARY ARTERY WITHOUT ANGINA PECTORIS: Chronic | Status: ACTIVE | Noted: 2019-08-19

## 2019-09-05 PROBLEM — Z87.19 PERSONAL HISTORY OF OTHER DISEASES OF THE DIGESTIVE SYSTEM: Chronic | Status: ACTIVE | Noted: 2019-08-19

## 2019-09-27 ENCOUNTER — APPOINTMENT (OUTPATIENT)
Dept: SURGERY | Facility: CLINIC | Age: 69
End: 2019-09-27
Payer: MEDICARE

## 2019-09-27 VITALS
RESPIRATION RATE: 15 BRPM | TEMPERATURE: 97.6 F | SYSTOLIC BLOOD PRESSURE: 125 MMHG | DIASTOLIC BLOOD PRESSURE: 82 MMHG | OXYGEN SATURATION: 98 % | HEART RATE: 78 BPM

## 2019-09-27 DIAGNOSIS — K40.90 UNILATERAL INGUINAL HERNIA, W/OUT OBSTRUCTION OR GANGRENE, NOT SPECIFIED AS RECURRENT: ICD-10-CM

## 2019-09-27 PROCEDURE — 99024 POSTOP FOLLOW-UP VISIT: CPT

## 2019-09-27 RX ORDER — OXYCODONE AND ACETAMINOPHEN 5; 325 MG/1; MG/1
5-325 TABLET ORAL
Qty: 10 | Refills: 0 | Status: DISCONTINUED | COMMUNITY
Start: 2019-08-26 | End: 2019-09-27

## 2020-05-13 ENCOUNTER — APPOINTMENT (OUTPATIENT)
Dept: SURGERY | Facility: CLINIC | Age: 70
End: 2020-05-13
Payer: MEDICARE

## 2020-05-13 VITALS
OXYGEN SATURATION: 98 % | HEART RATE: 78 BPM | BODY MASS INDEX: 27.99 KG/M2 | HEIGHT: 65 IN | WEIGHT: 168 LBS | RESPIRATION RATE: 18 BRPM | DIASTOLIC BLOOD PRESSURE: 82 MMHG | SYSTOLIC BLOOD PRESSURE: 116 MMHG | TEMPERATURE: 97.6 F

## 2020-05-13 DIAGNOSIS — Z09 ENCOUNTER FOR FOLLOW-UP EXAMINATION AFTER COMPLETED TREATMENT FOR CONDITIONS OTHER THAN MALIGNANT NEOPLASM: ICD-10-CM

## 2020-05-13 PROCEDURE — 99213 OFFICE O/P EST LOW 20 MIN: CPT

## 2020-05-13 NOTE — PHYSICAL EXAM
[de-identified] : Soft, nondistended, nontender. No palpable mass or organomegaly. Well-healed surgical scar in left groin with 6-7 cm nontender swelling over the area of the pubic tubercle. Repair appears intact. No local evidence of infection.

## 2020-05-13 NOTE — HISTORY OF PRESENT ILLNESS
[de-identified] : Shiva is a 68 y/o male here for post-operative visit. 8/26/19- left inguinal hernia repair with medium oval. \par  [de-identified] : Status post left inguinal hernia repair on 8/26/19. At present has no complaint of pain.

## 2020-05-13 NOTE — ASSESSMENT
[FreeTextEntry1] : 69-year-old male status post left inguinal hernia repair 9 months ago now with chronic hematoma/seroma in the left groin most likely related to postoperative resumption of anticoagulants for atrial fibrillation.

## 2020-05-13 NOTE — ASSESSMENT
[FreeTextEntry1] : Status post left femoral hernia repair with essentially normal postop course other than moderate seroma.

## 2020-05-13 NOTE — PHYSICAL EXAM
[de-identified] : Soft, nondistended, nontender. Left groin incision healing very well with normal ridge. Repair intact. Moderate sized seroma overlying left pubic tubercle but no signs of infection. Minimal posterior tenderness on left testicle.

## 2020-05-13 NOTE — PLAN
[FreeTextEntry1] : Discussed with patient possible excision of chronic hematoma and possibility of recurrent hematoma postop do to ongoing need for anticoagulation. The patient scheduled to see his cardiologist next month and will then discuss the possibility of surgery and perioperative management of anticoagulants.

## 2020-05-13 NOTE — HISTORY OF PRESENT ILLNESS
[de-identified] : Shiva is a 70 y/o male here for follow up visit. S/P eft inguinal hernia repair with medium oval on 8/26/19. Ultrasound from 03/06/20 complex appearing collection  demonstrated complex appearing collection in the left inguinal region. Today pt report pain level of 4 and swelling. Denies fever, chills , N/V.  Pt currently on Warfarin and Aspirin 81 mg. Take Tylenol PRN. Having daily normal BM's. \par \par  \par  [de-identified] : Status post left inguinal hernia repair in August of last year. Patient restarted aspirin and Coumadin postop for his atrial fibrillation. On follow up exam to recent surgery patient was noted to have a small to moderate sized left groin seroma. Since that visit he has noted some increase in the size of the bulge and now is experiencing intermittent discomfort during routine activities. Recent ultrasound demonstrated fluid collection consistent with hematoma or seroma. No local evidence of infection.

## 2020-05-13 NOTE — PLAN
[FreeTextEntry1] : Patient reassured that seroma will resolve completely over the next month or 2 and advised to liberalize activities as tolerated. Return here p.r.n.

## 2020-06-06 ENCOUNTER — NON-APPOINTMENT (OUTPATIENT)
Age: 70
End: 2020-06-06

## 2020-06-06 ENCOUNTER — APPOINTMENT (OUTPATIENT)
Dept: CARDIOLOGY | Facility: CLINIC | Age: 70
End: 2020-06-06
Payer: MEDICARE

## 2020-06-06 VITALS
HEIGHT: 65 IN | OXYGEN SATURATION: 95 % | WEIGHT: 170 LBS | RESPIRATION RATE: 14 BRPM | HEART RATE: 75 BPM | BODY MASS INDEX: 28.32 KG/M2 | TEMPERATURE: 98.5 F | SYSTOLIC BLOOD PRESSURE: 112 MMHG | DIASTOLIC BLOOD PRESSURE: 74 MMHG

## 2020-06-06 LAB — INR PPP: 2.3 RATIO

## 2020-06-06 PROCEDURE — 93290 INTERROG DEV EVAL ICPMS IP: CPT | Mod: 26

## 2020-06-06 PROCEDURE — 93283 PRGRMG EVAL IMPLANTABLE DFB: CPT

## 2020-06-06 PROCEDURE — 99214 OFFICE O/P EST MOD 30 MIN: CPT | Mod: 25

## 2020-06-06 PROCEDURE — 85610 PROTHROMBIN TIME: CPT | Mod: QW

## 2020-06-06 NOTE — REASON FOR VISIT
[Anticoagulation] : anticoagulation [Atrial Fibrillation] : atrial fibrillation [Coronary Artery Disease] : coronary artery disease

## 2020-06-08 NOTE — PHYSICAL EXAM
[General Appearance - Well Developed] : well developed [Normal Appearance] : normal appearance [Well Groomed] : well groomed [General Appearance - Well Nourished] : well nourished [General Appearance - In No Acute Distress] : no acute distress [No Deformities] : no deformities [Normal Conjunctiva] : the conjunctiva exhibited no abnormalities [No Oral Pallor] : no oral pallor [No Oral Cyanosis] : no oral cyanosis [Normal Oral Mucosa] : normal oral mucosa [Normal Jugular Venous A Waves Present] : normal jugular venous A waves present [Normal Jugular Venous V Waves Present] : normal jugular venous V waves present [No Jugular Venous Carrillo A Waves] : no jugular venous carrillo A waves [Respiration, Rhythm And Depth] : normal respiratory rhythm and effort [Exaggerated Use Of Accessory Muscles For Inspiration] : no accessory muscle use [Bowel Sounds] : normal bowel sounds [Auscultation Breath Sounds / Voice Sounds] : lungs were clear to auscultation bilaterally [Abdomen Soft] : soft [Abdomen Tenderness] : non-tender [Nail Clubbing] : no clubbing of the fingernails [Cyanosis, Localized] : no localized cyanosis [Abnormal Walk] : normal gait [Petechial Hemorrhages (___cm)] : no petechial hemorrhages [Skin Color & Pigmentation] : normal skin color and pigmentation [] : no rash [No Skin Ulcers] : no skin ulcer [Oriented To Time, Place, And Person] : oriented to person, place, and time [Affect] : the affect was normal [Mood] : the mood was normal [No Anxiety] : not feeling anxious [Normal Rate] : normal [Rhythm Regular] : regular [Normal S1] : normal S1 [Normal S2] : normal S2 [No Murmur] : no murmurs heard [No Pitting Edema] : no pitting edema present [FreeTextEntry1] : Extraocular muscles intact.  Anicteric sclerae. [S3] : no S3 [Right Carotid Bruit] : no bruit heard over the right carotid [Left Carotid Bruit] : no bruit heard over the left carotid [Bruit] : no bruit heard

## 2020-07-08 NOTE — DISCHARGE NOTE ADULT - SECONDARY DIAGNOSIS.
Problem: Nutrition/Hydration-ADULT  Goal: Nutrient/Hydration intake appropriate for improving, restoring or maintaining nutritional needs  Description  Monitor and assess patient's nutrition/hydration status for malnutrition  Collaborate with interdisciplinary team and initiate plan and interventions as ordered  Monitor patient's weight and dietary intake as ordered or per policy  Utilize nutrition screening tool and intervene as necessary  Determine patient's food preferences and provide high-protein, high-caloric foods as appropriate       INTERVENTIONS:  - Monitor oral intake, urinary output, labs, and treatment plans  - Assess nutrition and hydration status and recommend course of action  - Evaluate amount of meals eaten  - Assist patient with eating if necessary   - Allow adequate time for meals  - Recommend/ encourage appropriate diets, oral nutritional supplements, and vitamin/mineral supplements  - Order, calculate, and assess calorie counts as needed  - Recommend, monitor, and adjust tube feedings and TPN/PPN based on assessed needs  - Assess need for intravenous fluids  - Provide specific nutrition/hydration education as appropriate  - Include patient/family/caregiver in decisions related to nutrition  Outcome: Progressing     Problem: DISCHARGE PLANNING - CARE MANAGEMENT  Goal: Discharge to post-acute care or home with appropriate resources  Description  INTERVENTIONS:  - Conduct assessment to determine patient/family and health care team treatment goals, and need for post-acute services based on payer coverage, community resources, and patient preferences, and barriers to discharge  - Address psychosocial, clinical, and financial barriers to discharge as identified in assessment in conjunction with the patient/family and health care team  - Arrange appropriate level of post-acute services according to patients   needs and preference and payer coverage in collaboration with the physician and health care team  - Communicate with and update the patient/family, physician, and health care team regarding progress on the discharge plan  - Arrange appropriate transportation to post-acute venues  Outcome: Progressing     Problem: PAIN - ADULT  Goal: Verbalizes/displays adequate comfort level or baseline comfort level  Description  Interventions:  - Encourage patient to monitor pain and request assistance  - Assess pain using appropriate pain scale  - Administer analgesics based on type and severity of pain and evaluate response  - Implement non-pharmacological measures as appropriate and evaluate response  - Consider cultural and social influences on pain and pain management  - Notify physician/advanced practitioner if interventions unsuccessful or patient reports new pain  Outcome: Progressing     Problem: INFECTION - ADULT  Goal: Absence or prevention of progression during hospitalization  Description  INTERVENTIONS:  - Assess and monitor for signs and symptoms of infection  - Monitor lab/diagnostic results  - Monitor all insertion sites, i e  indwelling lines, tubes, and drains  - Monitor endotracheal if appropriate and nasal secretions for changes in amount and color  - [place] appropriate cooling/warming therapies per order  - Administer medications as ordered  - Instruct and encourage patient and family to use good hand hygiene technique  - Identify and instruct in appropriate isolation precautions for identified infection/condition  Outcome: Progressing  Goal: Absence of fever/infection during neutropenic period  Description  INTERVENTIONS:  - Monitor WBC    Outcome: Progressing     Problem: SAFETY ADULT  Goal: Patient will remain free of falls  Description  INTERVENTIONS:  - Assess patient frequently for physical needs  -  Identify cognitive and physical deficits and behaviors that affect risk of falls    -  [place] fall precautions as indicated by assessment   - Educate patient/family on patient safety including physical limitations  - Instruct patient to call for assistance with activity based on assessment  - Modify environment to reduce risk of injury  - Consider OT/PT consult to assist with strengthening/mobility  Outcome: Progressing  Goal: Maintain or return to baseline ADL function  Description  INTERVENTIONS:  -  Assess patient's ability to carry out ADLs; assess patient's baseline for ADL function and identify physical deficits which impact ability to perform ADLs (bathing, care of mouth/teeth, toileting, grooming, dressing, etc )  - Assess/evaluate cause of self-care deficits   - Assess range of motion  - Assess patient's mobility; develop plan if impaired  - Assess patient's need for assistive devices and provide as appropriate  - Encourage maximum independence but intervene and supervise when necessary  - Involve family in performance of ADLs  - Assess for home care needs following discharge   - Consider OT consult to assist with ADL evaluation and planning for discharge  - Provide patient education as appropriate  Outcome: Progressing  Goal: Maintain or return mobility status to optimal level  Description  INTERVENTIONS:  - Assess patient's baseline mobility status (ambulation, transfers, stairs, etc )    - Identify cognitive and physical deficits and behaviors that affect mobility  - Identify mobility aids required to assist with transfers and/or ambulation (gait belt, sit-to-stand, lift, walker, cane, etc )  - [place] fall precautions as indicated by assessment  - Record patient progress and toleration of activity level on Mobility SBAR; progress patient to next Phase/Stage  - Instruct patient to call for assistance with activity based on assessment  - Consider rehabilitation consult to assist with strengthening/weightbearing, etc   Outcome: Progressing     Problem: DISCHARGE PLANNING  Goal: Discharge to home or other facility with appropriate resources  Description  INTERVENTIONS:  - Identify barriers to discharge w/patient and caregiver  - Arrange for needed discharge resources and transportation as appropriate  - Identify discharge learning needs (meds, wound care, etc )  - Arrange for interpretive services to assist at discharge as needed  - Refer to Case Management Department for coordinating discharge planning if the patient needs post-hospital services based on physician/advanced practitioner order or complex needs related to functional status, cognitive ability, or social support system  Outcome: Progressing     Problem: Knowledge Deficit  Goal: Patient/family/caregiver demonstrates understanding of disease process, treatment plan, medications, and discharge instructions  Description  Complete learning assessment and assess knowledge base    Interventions:  - Provide teaching at level of understanding  - Provide teaching via preferred learning methods  Outcome: Progressing     Problem: GASTROINTESTINAL - ADULT  Goal: Minimal or absence of nausea and/or vomiting  Description  INTERVENTIONS:  - Administer IV fluids if ordered to ensure adequate hydration  - Maintain NPO status until nausea and vomiting are resolved  - Nasogastric tube if ordered  - Administer ordered antiemetic medications as needed  - Provide nonpharmacologic comfort measures as appropriate  - Advance diet as tolerated, if ordered  - Consider nutrition services referral to assist patient with adequate nutrition and appropriate food choices  Outcome: Progressing  Goal: Maintains or returns to baseline bowel function  Description  INTERVENTIONS:  - Assess bowel function  - Encourage oral fluids to ensure adequate hydration  - Administer IV fluids if ordered to ensure adequate hydration  - Administer ordered medications as needed  - Encourage mobilization and activity  - Consider nutritional services referral to assist patient with adequate nutrition and appropriate food choices  Outcome: Progressing  Goal: Maintains adequate nutritional intake  Description  INTERVENTIONS:  - Monitor percentage of each meal consumed  - Identify factors contributing to decreased intake, treat as appropriate  - Assist with meals as needed  - Monitor I&O, weight, and lab values if indicated  - Obtain nutrition services referral as needed  Outcome: Progressing     Problem: Potential for Falls  Goal: Patient will remain free of falls  Description  INTERVENTIONS:  - Assess patient frequently for physical needs  -  Identify cognitive and physical deficits and behaviors that affect risk of falls    -  [place] fall precautions as indicated by assessment   - Educate patient/family on patient safety including physical limitations  - Instruct patient to call for assistance with activity based on assessment  - Modify environment to reduce risk of injury  - Consider OT/PT consult to assist with strengthening/mobility  Outcome: Progressing Dyslipidemia HTN (hypertension)

## 2020-08-18 ENCOUNTER — APPOINTMENT (OUTPATIENT)
Dept: CARDIOLOGY | Facility: CLINIC | Age: 70
End: 2020-08-18
Payer: MEDICARE

## 2020-08-18 PROCEDURE — 99214 OFFICE O/P EST MOD 30 MIN: CPT | Mod: 25,95

## 2020-08-18 NOTE — HISTORY OF PRESENT ILLNESS
[Other Location: e.g. School (Enter Location, City,State)___] : at [unfilled], at the time of the visit. [Other Location: e.g. Home (Enter Location, City,State)___] : at [unfilled] [Verbal consent obtained from patient] : the patient, [unfilled]

## 2020-08-19 NOTE — REASON FOR VISIT
[Coronary Artery Disease] : coronary artery disease [FreeTextEntry1] : risk stratification prior to seroma drainage

## 2020-10-05 NOTE — PATIENT PROFILE ADULT. - ASSIST WITH
Subjective   Jeff Alatorre is a 79 y.o. male.     79-year-old male for management of hypertension, hyperlipidemia    Hypertension  This is a chronic problem. The current episode started more than 1 year ago. The problem has been resolved since onset. The problem is controlled. Pertinent negatives include no chest pain or shortness of breath. There are no associated agents to hypertension. Risk factors for coronary artery disease include dyslipidemia, male gender, sedentary lifestyle and family history. Past treatments include angiotensin blockers. Current antihypertension treatment includes angiotensin blockers. The current treatment provides moderate improvement. Compliance problems include exercise and diet.  Hypertensive end-organ damage includes PVD.     Vitals:    10/05/20 1319   BP: 130/82   Pulse: 74   Resp: 16   Temp: 98.6 °F (37 °C)   SpO2: 98%       The following portions of the patient's history were reviewed and updated as appropriate: allergies, current medications, past family history, past medical history, past social history, past surgical history and problem list.    Review of Systems   Respiratory: Negative for shortness of breath.    Cardiovascular: Negative for chest pain and leg swelling.        Right artery surgery on right       Objective   Physical Exam  Vitals signs and nursing note reviewed.   Constitutional:       Appearance: Normal appearance.   Cardiovascular:      Rate and Rhythm: Normal rate and regular rhythm.      Pulses: Normal pulses.      Heart sounds: Normal heart sounds.   Pulmonary:      Effort: Pulmonary effort is normal.      Breath sounds: Normal breath sounds.   Abdominal:      Palpations: Abdomen is soft. There is no mass.   Musculoskeletal:      Right lower leg: No edema.      Left lower leg: No edema.   Skin:     General: Skin is warm and dry.   Neurological:      General: No focal deficit present.      Mental Status: He is alert and oriented to person, place, and time.    Psychiatric:         Mood and Affect: Mood normal.         Behavior: Behavior normal.         Thought Content: Thought content normal.         Judgment: Judgment normal.         Patient's Body mass index is 28.7 kg/m². BMI is above normal parameters. Recommendations include: exercise counseling.      Assessment/Plan   There is no problem list on file for this patient.    Jeff was seen today for establish care.    Diagnoses and all orders for this visit:    Fatigue, unspecified type  -     TSH  -     T4, free  -     CBC & Differential    Essential hypertension  -     Comprehensive Metabolic Panel    Mixed hyperlipidemia  -     Comprehensive Metabolic Panel    Polycythemia vera (CMS/HCC)  -     Vitamin B12  -     Folate    Chronic gout without tophus, unspecified cause, unspecified site  -     Uric Acid    Other orders  -     losartan (COZAAR) 100 MG tablet; Take 1 tablet by mouth Daily.       Return in about 2 weeks (around 10/19/2020).    COVID-19 safety long discussion stop testosterone information given return for a Medicare wellness physical         Electronically signed by Devon Vazquez MD 10/05/2020   walking/standing

## 2020-12-05 ENCOUNTER — APPOINTMENT (OUTPATIENT)
Dept: CARDIOLOGY | Facility: CLINIC | Age: 70
End: 2020-12-05
Payer: MEDICARE

## 2020-12-05 ENCOUNTER — NON-APPOINTMENT (OUTPATIENT)
Age: 70
End: 2020-12-05

## 2020-12-05 VITALS
DIASTOLIC BLOOD PRESSURE: 75 MMHG | HEART RATE: 89 BPM | OXYGEN SATURATION: 94 % | SYSTOLIC BLOOD PRESSURE: 113 MMHG | HEIGHT: 65 IN | RESPIRATION RATE: 12 BRPM | TEMPERATURE: 97.5 F | BODY MASS INDEX: 27.82 KG/M2 | WEIGHT: 167 LBS

## 2020-12-05 LAB — INR PPP: 2.1 RATIO

## 2020-12-05 PROCEDURE — 99214 OFFICE O/P EST MOD 30 MIN: CPT | Mod: 25

## 2020-12-05 PROCEDURE — 93290 INTERROG DEV EVAL ICPMS IP: CPT | Mod: 26

## 2020-12-05 PROCEDURE — 99072 ADDL SUPL MATRL&STAF TM PHE: CPT

## 2020-12-05 PROCEDURE — 93283 PRGRMG EVAL IMPLANTABLE DFB: CPT

## 2020-12-05 PROCEDURE — 85610 PROTHROMBIN TIME: CPT | Mod: QW

## 2020-12-05 NOTE — REASON FOR VISIT
[Anticoagulation] : anticoagulation [Coronary Artery Disease] : coronary artery disease [Heart Failure] : congestive heart failure

## 2020-12-05 NOTE — PHYSICAL EXAM
[General Appearance - Well Developed] : well developed [Normal Appearance] : normal appearance [Well Groomed] : well groomed [General Appearance - Well Nourished] : well nourished [No Deformities] : no deformities [General Appearance - In No Acute Distress] : no acute distress [Normal Conjunctiva] : the conjunctiva exhibited no abnormalities [Normal Jugular Venous A Waves Present] : normal jugular venous A waves present [Normal Jugular Venous V Waves Present] : normal jugular venous V waves present [No Jugular Venous Carrillo A Waves] : no jugular venous carrillo A waves [Respiration, Rhythm And Depth] : normal respiratory rhythm and effort [Exaggerated Use Of Accessory Muscles For Inspiration] : no accessory muscle use [Auscultation Breath Sounds / Voice Sounds] : lungs were clear to auscultation bilaterally [Bowel Sounds] : normal bowel sounds [Abdomen Soft] : soft [Abdomen Tenderness] : non-tender [Abnormal Walk] : normal gait [Nail Clubbing] : no clubbing of the fingernails [Cyanosis, Localized] : no localized cyanosis [Petechial Hemorrhages (___cm)] : no petechial hemorrhages [Skin Color & Pigmentation] : normal skin color and pigmentation [] : no rash [No Skin Ulcers] : no skin ulcer [Oriented To Time, Place, And Person] : oriented to person, place, and time [Affect] : the affect was normal [Mood] : the mood was normal [No Anxiety] : not feeling anxious [Normal Rate] : normal [Rhythm Regular] : regular [Normal S1] : normal S1 [Normal S2] : normal S2 [No Murmur] : no murmurs heard [No Pitting Edema] : no pitting edema present [FreeTextEntry1] : There was mild HJR. [S3] : no S3 [Right Carotid Bruit] : no bruit heard over the right carotid [Left Carotid Bruit] : no bruit heard over the left carotid [Bruit] : no bruit heard

## 2021-01-15 ENCOUNTER — TRANSCRIPTION ENCOUNTER (OUTPATIENT)
Age: 71
End: 2021-01-15

## 2021-05-06 NOTE — ASU PREOP CHECKLIST - BOWEL PREP
[FreeTextEntry1] :   The patient wishes to proceed with a cortisone injection at this time (1). The skin was prepped with alcohol and sprayed with  Ethyl Chloride. An injection of 0.5 cc 1% Lidocaine without epinephrine, 0.25 cc Kenalog 40mg, and 0.25 cc  Dexamethasone was administered into the flexor tendon sheath of the right long finger. The patient tolerated the procedure well.  Apply ice.		\par \par The patient wishes to proceed with a cortisone injection today (1). Under sterile precautions, an injection of 5cc 1% lidocaine without epinephrine and 0.5cc Kenalog 40mg, 0.5cc Dexamethasone was administered into the right distal radial ulnar joint. The patient tolerated the procedure well.\par \par Patient can continue activities as tolerated. All questions answered, understanding verbalized. n/a

## 2021-05-14 ENCOUNTER — NON-APPOINTMENT (OUTPATIENT)
Age: 71
End: 2021-05-14

## 2021-06-05 ENCOUNTER — APPOINTMENT (OUTPATIENT)
Dept: CARDIOLOGY | Facility: CLINIC | Age: 71
End: 2021-06-05
Payer: MEDICARE

## 2021-06-05 ENCOUNTER — NON-APPOINTMENT (OUTPATIENT)
Age: 71
End: 2021-06-05

## 2021-06-05 VITALS
OXYGEN SATURATION: 96 % | HEART RATE: 80 BPM | TEMPERATURE: 97.5 F | WEIGHT: 167 LBS | DIASTOLIC BLOOD PRESSURE: 62 MMHG | SYSTOLIC BLOOD PRESSURE: 92 MMHG | RESPIRATION RATE: 12 BRPM | HEIGHT: 65 IN | BODY MASS INDEX: 27.82 KG/M2

## 2021-06-05 LAB
INR PPP: 3.1 RATIO
POCT-PROTHROMBIN TIME: 37.4 SECS

## 2021-06-05 PROCEDURE — 93290 INTERROG DEV EVAL ICPMS IP: CPT | Mod: 26

## 2021-06-05 PROCEDURE — 93283 PRGRMG EVAL IMPLANTABLE DFB: CPT

## 2021-06-05 PROCEDURE — 85610 PROTHROMBIN TIME: CPT | Mod: QW

## 2021-06-05 PROCEDURE — 99072 ADDL SUPL MATRL&STAF TM PHE: CPT

## 2021-06-05 PROCEDURE — 99215 OFFICE O/P EST HI 40 MIN: CPT | Mod: 25

## 2021-06-05 NOTE — PHYSICAL EXAM
[General Appearance - Well Developed] : well developed [Normal Appearance] : normal appearance [Well Groomed] : well groomed [General Appearance - Well Nourished] : well nourished [No Deformities] : no deformities [General Appearance - In No Acute Distress] : no acute distress [Normal Conjunctiva] : the conjunctiva exhibited no abnormalities [Normal Jugular Venous A Waves Present] : normal jugular venous A waves present [Normal Jugular Venous V Waves Present] : normal jugular venous V waves present [No Jugular Venous Carrillo A Waves] : no jugular venous carrillo A waves [Respiration, Rhythm And Depth] : normal respiratory rhythm and effort [Exaggerated Use Of Accessory Muscles For Inspiration] : no accessory muscle use [Auscultation Breath Sounds / Voice Sounds] : lungs were clear to auscultation bilaterally [Bowel Sounds] : normal bowel sounds [Abdomen Soft] : soft [Abdomen Tenderness] : non-tender [Abnormal Walk] : normal gait [Nail Clubbing] : no clubbing of the fingernails [Cyanosis, Localized] : no localized cyanosis [Petechial Hemorrhages (___cm)] : no petechial hemorrhages [Skin Color & Pigmentation] : normal skin color and pigmentation [] : no rash [No Skin Ulcers] : no skin ulcer [Oriented To Time, Place, And Person] : oriented to person, place, and time [Affect] : the affect was normal [Mood] : the mood was normal [No Anxiety] : not feeling anxious [Normal Rate] : normal [Rhythm Regular] : regular [Normal S1] : normal S1 [Normal S2] : normal S2 [No Murmur] : no murmurs heard [No Pitting Edema] : no pitting edema present [FreeTextEntry1] : He was wearing a face mask during the examination.  [S3] : no S3 [Right Carotid Bruit] : no bruit heard over the right carotid [Left Carotid Bruit] : no bruit heard over the left carotid [Bruit] : no bruit heard

## 2021-06-24 NOTE — ASU DISCHARGE PLAN (ADULT/PEDIATRIC) - DIET/FLUID RESTRICTION
Start wearing supportive sports bra at all times  Start NSAID (ibuprofen 600mg three times a day or naproxen 500mg two times a day) per day for 2 weeks, with meals  Ice and/or heat the area   No change

## 2021-07-30 ENCOUNTER — APPOINTMENT (OUTPATIENT)
Dept: FAMILY MEDICINE | Facility: CLINIC | Age: 71
End: 2021-07-30
Payer: MEDICARE

## 2021-07-30 VITALS
TEMPERATURE: 97.3 F | OXYGEN SATURATION: 99 % | WEIGHT: 164 LBS | SYSTOLIC BLOOD PRESSURE: 112 MMHG | BODY MASS INDEX: 27.32 KG/M2 | DIASTOLIC BLOOD PRESSURE: 72 MMHG | HEIGHT: 65 IN | HEART RATE: 81 BPM

## 2021-07-30 DIAGNOSIS — R10.9 UNSPECIFIED ABDOMINAL PAIN: ICD-10-CM

## 2021-07-30 PROCEDURE — G0438: CPT

## 2021-07-30 PROCEDURE — 36415 COLL VENOUS BLD VENIPUNCTURE: CPT

## 2021-07-30 NOTE — PLAN
[FreeTextEntry1] : Will follow up labwork drawn in office today.\par \par HM-patient will check when due back for colonoscopy.  Check PSA. \par \par Abdominal Discomfort-US Abd ordered- general surgery evaluation. \par \par Will adjust meds based on labs. \par Patient expressed understanding of plan.\par

## 2021-07-30 NOTE — PHYSICAL EXAM
no [Normal Oropharynx] : the oropharynx was normal [No Lymphadenopathy] : no lymphadenopathy [Supple] : supple [No Edema] : there was no peripheral edema [No Extremity Clubbing/Cyanosis] : no extremity clubbing/cyanosis [Normal] : affect was normal and insight and judgment were intact [Normal TMs] : both tympanic membranes were normal [Soft] : abdomen soft [Non Tender] : non-tender [Non-distended] : non-distended [Normal Bowel Sounds] : normal bowel sounds [de-identified] : open cholecystectomy scar [de-identified] : No spinal tenderness

## 2021-07-30 NOTE — HEALTH RISK ASSESSMENT
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

## 2021-07-30 NOTE — HISTORY OF PRESENT ILLNESS
[FreeTextEntry1] : Mr. ARMSTRONG presents for annual physical.\par  [de-identified] : Mr. ARMSTRONG presents for annual physical.\par \par Vitamin B12 injections monthly. Does at home every 20th of the month. 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 \par Follows regularly with his Cardiologist. Has ICD.\par Fractured left leg 2007; decreased ROM from surgery.  Possible knee surgery candidate.\par  \par COVID vaccines-April 2021.  \par Had COVID Sept 2020.  Treated at home.  \par \par Experienced some episodes of right abdominal pain under gallbladder scar for 3 days last month which resolved.\par Medications and allergies reviewed.\par

## 2021-07-30 NOTE — REVIEW OF SYSTEMS
[Negative] : Genitourinary [Nausea] : no nausea [Diarrhea] : no diarrhea [Constipation] : no constipation [Vomiting] : no vomiting [Back Pain] : no back pain [Headache] : no headache [Dizziness] : no dizziness [FreeTextEntry7] : abdominal pain on and off

## 2021-08-02 LAB
25(OH)D3 SERPL-MCNC: 68.2 NG/ML
ALBUMIN SERPL ELPH-MCNC: 4.4 G/DL
ALP BLD-CCNC: 62 U/L
ALT SERPL-CCNC: 20 U/L
ANION GAP SERPL CALC-SCNC: 12 MMOL/L
APPEARANCE: CLEAR
AST SERPL-CCNC: 23 U/L
BASOPHILS # BLD AUTO: 0.03 K/UL
BASOPHILS NFR BLD AUTO: 0.7 %
BILIRUB SERPL-MCNC: 1.3 MG/DL
BILIRUBIN URINE: NEGATIVE
BLOOD URINE: NEGATIVE
BUN SERPL-MCNC: 18 MG/DL
CALCIUM SERPL-MCNC: 8.8 MG/DL
CHLORIDE SERPL-SCNC: 104 MMOL/L
CHOLEST SERPL-MCNC: 151 MG/DL
CO2 SERPL-SCNC: 23 MMOL/L
COLOR: YELLOW
CREAT SERPL-MCNC: 0.98 MG/DL
EOSINOPHIL # BLD AUTO: 0.16 K/UL
EOSINOPHIL NFR BLD AUTO: 3.7 %
ESTIMATED AVERAGE GLUCOSE: 120 MG/DL
FOLATE SERPL-MCNC: >20 NG/ML
GLUCOSE QUALITATIVE U: NEGATIVE
GLUCOSE SERPL-MCNC: 108 MG/DL
HBA1C MFR BLD HPLC: 5.8 %
HCT VFR BLD CALC: 47.1 %
HDLC SERPL-MCNC: 46 MG/DL
HGB BLD-MCNC: 15.4 G/DL
IMM GRANULOCYTES NFR BLD AUTO: 0.7 %
INR PPP: 3.33 RATIO
KETONES URINE: NEGATIVE
LDLC SERPL CALC-MCNC: 80 MG/DL
LEUKOCYTE ESTERASE URINE: NEGATIVE
LYMPHOCYTES # BLD AUTO: 1.12 K/UL
LYMPHOCYTES NFR BLD AUTO: 25.6 %
MAN DIFF?: NORMAL
MCHC RBC-ENTMCNC: 29.7 PG
MCHC RBC-ENTMCNC: 32.7 GM/DL
MCV RBC AUTO: 90.9 FL
MONOCYTES # BLD AUTO: 0.47 K/UL
MONOCYTES NFR BLD AUTO: 10.8 %
NEUTROPHILS # BLD AUTO: 2.56 K/UL
NEUTROPHILS NFR BLD AUTO: 58.5 %
NITRITE URINE: NEGATIVE
NONHDLC SERPL-MCNC: 106 MG/DL
PH URINE: 6.5
PLATELET # BLD AUTO: 229 K/UL
POTASSIUM SERPL-SCNC: 4.5 MMOL/L
PROT SERPL-MCNC: 6.8 G/DL
PROTEIN URINE: NORMAL
PSA SERPL-MCNC: 0.45 NG/ML
PT BLD: 37.1 SEC
RBC # BLD: 5.18 M/UL
RBC # FLD: 13.1 %
SODIUM SERPL-SCNC: 139 MMOL/L
SPECIFIC GRAVITY URINE: 1.02
TRIGL SERPL-MCNC: 129 MG/DL
TSH SERPL-ACNC: 1.13 UIU/ML
UROBILINOGEN URINE: NORMAL
VIT B12 SERPL-MCNC: 562 PG/ML
WBC # FLD AUTO: 4.37 K/UL

## 2021-08-06 ENCOUNTER — TRANSCRIPTION ENCOUNTER (OUTPATIENT)
Age: 71
End: 2021-08-06

## 2021-08-13 ENCOUNTER — OUTPATIENT (OUTPATIENT)
Dept: OUTPATIENT SERVICES | Facility: HOSPITAL | Age: 71
LOS: 1 days | End: 2021-08-13
Payer: MEDICARE

## 2021-08-13 ENCOUNTER — APPOINTMENT (OUTPATIENT)
Dept: ULTRASOUND IMAGING | Facility: CLINIC | Age: 71
End: 2021-08-13
Payer: MEDICARE

## 2021-08-13 DIAGNOSIS — Z00.8 ENCOUNTER FOR OTHER GENERAL EXAMINATION: ICD-10-CM

## 2021-08-13 DIAGNOSIS — Z95.810 PRESENCE OF AUTOMATIC (IMPLANTABLE) CARDIAC DEFIBRILLATOR: Chronic | ICD-10-CM

## 2021-08-13 DIAGNOSIS — R10.9 UNSPECIFIED ABDOMINAL PAIN: ICD-10-CM

## 2021-08-13 DIAGNOSIS — Z90.49 ACQUIRED ABSENCE OF OTHER SPECIFIED PARTS OF DIGESTIVE TRACT: Chronic | ICD-10-CM

## 2021-08-13 DIAGNOSIS — Z95.5 PRESENCE OF CORONARY ANGIOPLASTY IMPLANT AND GRAFT: Chronic | ICD-10-CM

## 2021-08-13 DIAGNOSIS — S82.90XA UNSPECIFIED FRACTURE OF UNSPECIFIED LOWER LEG, INITIAL ENCOUNTER FOR CLOSED FRACTURE: Chronic | ICD-10-CM

## 2021-08-13 PROCEDURE — 76700 US EXAM ABDOM COMPLETE: CPT | Mod: 26

## 2021-08-13 PROCEDURE — 76700 US EXAM ABDOM COMPLETE: CPT

## 2021-08-16 NOTE — ASU PREOP CHECKLIST - IDENTIFICATION BAND VERIFIED
Patient was discharged from the hospital on 8/9/21. Clinic thought that she went to a nursing home when reading hospital care coordination notes, but when clinic called the nursing home today to make sure patient was still there it was discovered that the patient actually went home at discharge. Called patient and she states that she has been taking her normal dose since discharge of Coumadin 3.75 mg W, 2.5 mg [id number]. Patient is scheduled in clinic for 8/18/21.       Sandra Davis, PharmD, BCPS  8/16/2021  1:57 PM done

## 2021-10-02 ENCOUNTER — APPOINTMENT (OUTPATIENT)
Dept: CARDIOLOGY | Facility: CLINIC | Age: 71
End: 2021-10-02
Payer: MEDICARE

## 2021-10-02 ENCOUNTER — NON-APPOINTMENT (OUTPATIENT)
Age: 71
End: 2021-10-02

## 2021-10-02 VITALS
HEIGHT: 65 IN | WEIGHT: 162 LBS | TEMPERATURE: 97.7 F | DIASTOLIC BLOOD PRESSURE: 72 MMHG | RESPIRATION RATE: 12 BRPM | HEART RATE: 79 BPM | BODY MASS INDEX: 26.99 KG/M2 | SYSTOLIC BLOOD PRESSURE: 106 MMHG | OXYGEN SATURATION: 96 %

## 2021-10-02 LAB
INR PPP: 2.3 RATIO
POCT-PROTHROMBIN TIME: 27.3 SECS
QUALITY CONTROL: YES

## 2021-10-02 PROCEDURE — 93283 PRGRMG EVAL IMPLANTABLE DFB: CPT

## 2021-10-02 PROCEDURE — 99215 OFFICE O/P EST HI 40 MIN: CPT | Mod: 25

## 2021-10-02 PROCEDURE — 85610 PROTHROMBIN TIME: CPT | Mod: QW

## 2021-10-02 PROCEDURE — 93290 INTERROG DEV EVAL ICPMS IP: CPT | Mod: 26

## 2021-10-03 NOTE — DISCUSSION/SUMMARY
[FreeTextEntry1] : Device is an ICD, Medtronic Evera XT DR [id number].  His device is functioning normally.  He is set at AAI---DDD at a rate of 50 and an upper track rate of 130 beats per minute.  The atrial amplitude is set at 1.5 volts at a pulse width of 0.4 milliseconds.  The ventricular amplitude is set at 2.0 volts at a pulse width of 0.4 milliseconds.  He has a VT zone set at a rate between 176-200 beats per minute with Burst (2), Ramp (1), and 35 Joules x 4.  He has a VF zone set at a rate of >200 beats per minute where there is ATP during charging and 35 Joules x 6.\par \par He is atrial and ventricular sensed approximately 99% of the time. His OptiVol is now normal, however, was elevated between 8/29/2021 and 9/6/2021 (which correlated with him being on vacation).  He had two episodes of nonsustained VT since his last interrogation (longest being 14 beats and lasting about 4 seconds on 9/4/2021). No therapy was required for any of the episodes.\par \par There is adequate battery life with remaining longevity of 2.9 years.  The atrial impedance is 513 ohms, the ventricular impedance is 456 ohms, the RV defibrillation impedance is 48 ohms, and the SVC defibrillation impedance is 57 ohms.  The measured P wave is 3.1 mV and the measured R wave is 7 mV.  His atrial threshold was 0.625volts @0.4ms and the right ventricular threshold was 1 volt @0.4ms.

## 2021-10-03 NOTE — PHYSICAL EXAM
[General Appearance - Well Developed] : well developed [Normal Appearance] : normal appearance [Well Groomed] : well groomed [General Appearance - Well Nourished] : well nourished [No Deformities] : no deformities [General Appearance - In No Acute Distress] : no acute distress [Normal Conjunctiva] : the conjunctiva exhibited no abnormalities [Normal Jugular Venous A Waves Present] : normal jugular venous A waves present [Normal Jugular Venous V Waves Present] : normal jugular venous V waves present [No Jugular Venous Carrillo A Waves] : no jugular venous carrillo A waves [Exaggerated Use Of Accessory Muscles For Inspiration] : no accessory muscle use [Respiration, Rhythm And Depth] : normal respiratory rhythm and effort [Auscultation Breath Sounds / Voice Sounds] : lungs were clear to auscultation bilaterally [Bowel Sounds] : normal bowel sounds [Abdomen Soft] : soft [Abdomen Tenderness] : non-tender [Abnormal Walk] : normal gait [Nail Clubbing] : no clubbing of the fingernails [Cyanosis, Localized] : no localized cyanosis [Petechial Hemorrhages (___cm)] : no petechial hemorrhages [Skin Color & Pigmentation] : normal skin color and pigmentation [] : no rash [No Skin Ulcers] : no skin ulcer [Oriented To Time, Place, And Person] : oriented to person, place, and time [Affect] : the affect was normal [Mood] : the mood was normal [No Anxiety] : not feeling anxious [Normal Rate] : normal [Rhythm Regular] : regular [Normal S1] : normal S1 [Normal S2] : normal S2 [No Murmur] : no murmurs heard [No Pitting Edema] : no pitting edema present [FreeTextEntry1] : He was wearing a face mask during the examination.  [S3] : no S3 [Right Carotid Bruit] : no bruit heard over the right carotid [Left Carotid Bruit] : no bruit heard over the left carotid [Bruit] : no bruit heard

## 2021-11-04 ENCOUNTER — RX RENEWAL (OUTPATIENT)
Age: 71
End: 2021-11-04

## 2021-12-16 ENCOUNTER — TRANSCRIPTION ENCOUNTER (OUTPATIENT)
Age: 71
End: 2021-12-16

## 2021-12-23 ENCOUNTER — RESULT CHARGE (OUTPATIENT)
Age: 71
End: 2021-12-23

## 2021-12-23 LAB
INR PPP: 2.1 RATIO
POCT-PROTHROMBIN TIME: 25.1 SECS

## 2022-02-10 ENCOUNTER — APPOINTMENT (OUTPATIENT)
Dept: FAMILY MEDICINE | Facility: CLINIC | Age: 72
End: 2022-02-10
Payer: MEDICARE

## 2022-02-10 VITALS
OXYGEN SATURATION: 97 % | HEART RATE: 91 BPM | WEIGHT: 160 LBS | BODY MASS INDEX: 26.66 KG/M2 | HEIGHT: 65 IN | SYSTOLIC BLOOD PRESSURE: 112 MMHG | DIASTOLIC BLOOD PRESSURE: 70 MMHG | TEMPERATURE: 98.1 F

## 2022-02-10 PROCEDURE — 36415 COLL VENOUS BLD VENIPUNCTURE: CPT

## 2022-02-10 PROCEDURE — 99214 OFFICE O/P EST MOD 30 MIN: CPT | Mod: 25

## 2022-02-10 NOTE — PHYSICAL EXAM
[Normal Oropharynx] : the oropharynx was normal [Normal] : affect was normal and insight and judgment were intact [No Lymphadenopathy] : no lymphadenopathy [No Edema] : there was no peripheral edema [No Extremity Clubbing/Cyanosis] : no extremity clubbing/cyanosis

## 2022-02-10 NOTE — HISTORY OF PRESENT ILLNESS
[FreeTextEntry1] : Here for follow-up; needs med refills.\par  [de-identified] : Here for follow-up; needs med refills.\par \par Split time between [place] and [facility]. \par \par Saw Cardio-Coumadin 5mg 5 days; 7.5mg Mon, Thursday.\par \par -Saw Surgeon in the Fall for seroma.  History of hernia repair.  Had follow-up sonogram with surgeon-no fluid on repeat. \par In the groin some discomfort still.  \par December 2021 repeat sonogram which was normal.  \par \par Has been doing weekly Vitamin B12 injections. \par Medications and allergies reviewed.\par

## 2022-02-10 NOTE — PLAN
[FreeTextEntry1] : Will follow up labwork drawn in office today.  Stable exam.\par Check INR, check Vitamin B12. \par \par Request that records from surgeon be sent to us-advised if ongoing will need surgical re-evaluation.\par \par Will adjust meds based on labs. \par Patient expressed understanding of plan.\par

## 2022-02-11 LAB
ALBUMIN SERPL ELPH-MCNC: 4.2 G/DL
ALP BLD-CCNC: 63 U/L
ALT SERPL-CCNC: 21 U/L
ANION GAP SERPL CALC-SCNC: 12 MMOL/L
AST SERPL-CCNC: 22 U/L
BASOPHILS # BLD AUTO: 0.03 K/UL
BASOPHILS NFR BLD AUTO: 0.8 %
BILIRUB SERPL-MCNC: 0.8 MG/DL
BUN SERPL-MCNC: 21 MG/DL
CALCIUM SERPL-MCNC: 9.2 MG/DL
CHLORIDE SERPL-SCNC: 106 MMOL/L
CHOLEST SERPL-MCNC: 146 MG/DL
CO2 SERPL-SCNC: 24 MMOL/L
CREAT SERPL-MCNC: 0.98 MG/DL
EOSINOPHIL # BLD AUTO: 0.11 K/UL
EOSINOPHIL NFR BLD AUTO: 3 %
ESTIMATED AVERAGE GLUCOSE: 120 MG/DL
FOLATE SERPL-MCNC: >20 NG/ML
GLUCOSE SERPL-MCNC: 99 MG/DL
HBA1C MFR BLD HPLC: 5.8 %
HCT VFR BLD CALC: 45.2 %
HDLC SERPL-MCNC: 44 MG/DL
HGB BLD-MCNC: 14.4 G/DL
IMM GRANULOCYTES NFR BLD AUTO: 0.8 %
INR PPP: 1.92 RATIO
LDLC SERPL CALC-MCNC: 68 MG/DL
LYMPHOCYTES # BLD AUTO: 0.93 K/UL
LYMPHOCYTES NFR BLD AUTO: 25.1 %
MAN DIFF?: NORMAL
MCHC RBC-ENTMCNC: 29.8 PG
MCHC RBC-ENTMCNC: 31.9 GM/DL
MCV RBC AUTO: 93.4 FL
MONOCYTES # BLD AUTO: 0.4 K/UL
MONOCYTES NFR BLD AUTO: 10.8 %
NEUTROPHILS # BLD AUTO: 2.2 K/UL
NEUTROPHILS NFR BLD AUTO: 59.5 %
NONHDLC SERPL-MCNC: 102 MG/DL
PLATELET # BLD AUTO: 228 K/UL
POTASSIUM SERPL-SCNC: 4.6 MMOL/L
PROT SERPL-MCNC: 6.7 G/DL
PT BLD: 22.1 SEC
RBC # BLD: 4.84 M/UL
RBC # FLD: 13.6 %
SODIUM SERPL-SCNC: 142 MMOL/L
TRIGL SERPL-MCNC: 169 MG/DL
TSH SERPL-ACNC: 1.2 UIU/ML
VIT B12 SERPL-MCNC: 391 PG/ML
WBC # FLD AUTO: 3.7 K/UL

## 2022-03-18 ENCOUNTER — TRANSCRIPTION ENCOUNTER (OUTPATIENT)
Age: 72
End: 2022-03-18

## 2022-04-25 ENCOUNTER — RX RENEWAL (OUTPATIENT)
Age: 72
End: 2022-04-25

## 2022-04-25 ENCOUNTER — TRANSCRIPTION ENCOUNTER (OUTPATIENT)
Age: 72
End: 2022-04-25

## 2022-06-23 ENCOUNTER — RX RENEWAL (OUTPATIENT)
Age: 72
End: 2022-06-23

## 2022-06-24 ENCOUNTER — NON-APPOINTMENT (OUTPATIENT)
Age: 72
End: 2022-06-24

## 2022-07-22 ENCOUNTER — RX RENEWAL (OUTPATIENT)
Age: 72
End: 2022-07-22

## 2022-07-25 ENCOUNTER — RX RENEWAL (OUTPATIENT)
Age: 72
End: 2022-07-25

## 2022-07-25 ENCOUNTER — TRANSCRIPTION ENCOUNTER (OUTPATIENT)
Age: 72
End: 2022-07-25

## 2022-08-04 ENCOUNTER — TRANSCRIPTION ENCOUNTER (OUTPATIENT)
Age: 72
End: 2022-08-04

## 2022-08-04 ENCOUNTER — RX RENEWAL (OUTPATIENT)
Age: 72
End: 2022-08-04

## 2022-08-19 ENCOUNTER — APPOINTMENT (OUTPATIENT)
Dept: FAMILY MEDICINE | Facility: CLINIC | Age: 72
End: 2022-08-19

## 2022-08-24 ENCOUNTER — RX RENEWAL (OUTPATIENT)
Age: 72
End: 2022-08-24

## 2022-08-25 ENCOUNTER — APPOINTMENT (OUTPATIENT)
Dept: CARDIOLOGY | Facility: CLINIC | Age: 72
End: 2022-08-25

## 2022-08-25 ENCOUNTER — NON-APPOINTMENT (OUTPATIENT)
Age: 72
End: 2022-08-25

## 2022-08-25 VITALS
WEIGHT: 156 LBS | TEMPERATURE: 97.6 F | HEART RATE: 67 BPM | OXYGEN SATURATION: 98 % | RESPIRATION RATE: 12 BRPM | BODY MASS INDEX: 25.99 KG/M2 | HEIGHT: 65 IN

## 2022-08-25 PROCEDURE — 93283 PRGRMG EVAL IMPLANTABLE DFB: CPT

## 2022-08-25 PROCEDURE — 93290 INTERROG DEV EVAL ICPMS IP: CPT | Mod: 26

## 2022-08-25 PROCEDURE — 99215 OFFICE O/P EST HI 40 MIN: CPT | Mod: 25

## 2022-08-25 PROCEDURE — 85610 PROTHROMBIN TIME: CPT | Mod: QW

## 2022-08-26 LAB
INR PPP: 2.6 RATIO
POCT-PROTHROMBIN TIME: 30.9 SECS
QUALITY CONTROL: YES

## 2022-08-28 ENCOUNTER — NON-APPOINTMENT (OUTPATIENT)
Age: 72
End: 2022-08-28

## 2022-08-28 NOTE — DISCUSSION/SUMMARY
[FreeTextEntry1] : Device is an ICD, Medtronic Evera XT  [id number].  His device is functioning normally.  He is set at AAI---DDD at a rate of 50 and an upper track rate of 130 beats per minute.  The atrial amplitude is set at 1.5 volts at a pulse width of 0.4 milliseconds.  The ventricular amplitude is set at 2.25 volts at a pulse width of 0.4 milliseconds.  He has a VT zone set at a rate between 176-200 beats per minute with Burst (2), Ramp (1), and 35 Joules x 4.  He has a VF zone set at a rate of >200 beats per minute where there is ATP during charging and 35 Joules x 6.\par \par He is atrial and ventricular sensed approximately 99% of the time. His OptiVol has been normal since his last interrogation 10 months ago (was last elevated between 8/29/2021 and 9/6/2021).  He has not had any stored arrhythmias. \par \par There is adequate battery life with remaining longevity of 2 years.  The atrial impedance is 456 ohms, the ventricular impedance is 456 ohms, the RV defibrillation impedance is 43 ohms, and the SVC defibrillation impedance is 53 ohms.  The measured P wave is 2.5 mV and the measured R wave is 7.5 mV.  His atrial threshold was 0.625volts @0.4ms and the right ventricular threshold was 1.125 volts @0.4ms.

## 2022-09-06 ENCOUNTER — RX RENEWAL (OUTPATIENT)
Age: 72
End: 2022-09-06

## 2022-09-26 ENCOUNTER — RX RENEWAL (OUTPATIENT)
Age: 72
End: 2022-09-26

## 2022-09-30 ENCOUNTER — APPOINTMENT (OUTPATIENT)
Dept: FAMILY MEDICINE | Facility: CLINIC | Age: 72
End: 2022-09-30

## 2022-09-30 VITALS
BODY MASS INDEX: 24.99 KG/M2 | SYSTOLIC BLOOD PRESSURE: 108 MMHG | DIASTOLIC BLOOD PRESSURE: 66 MMHG | HEIGHT: 65 IN | TEMPERATURE: 98.3 F | HEART RATE: 64 BPM | WEIGHT: 150 LBS | OXYGEN SATURATION: 98 %

## 2022-09-30 PROCEDURE — G0439: CPT

## 2022-09-30 PROCEDURE — 36415 COLL VENOUS BLD VENIPUNCTURE: CPT

## 2022-09-30 NOTE — PHYSICAL EXAM
[Normal Sclera/Conjunctiva] : normal sclera/conjunctiva [Normal Oropharynx] : the oropharynx was normal [Normal TMs] : both tympanic membranes were normal [No Lymphadenopathy] : no lymphadenopathy [No Edema] : there was no peripheral edema [No Extremity Clubbing/Cyanosis] : no extremity clubbing/cyanosis [Normal] : affect was normal and insight and judgment were intact [de-identified] : no reproducible tenderness to palpation

## 2022-09-30 NOTE — REVIEW OF SYSTEMS
[Cough] : cough [Fever] : no fever [Chills] : no chills [Palpitations] : no palpitations [Abdominal Pain] : no abdominal pain [Nausea] : no nausea [Constipation] : no constipation [Diarrhea] : no diarrhea [Vomiting] : no vomiting [Heartburn] : no heartburn [Headache] : no headache [Dizziness] : no dizziness [Negative] : Cardiovascular [FreeTextEntry6] : on and off dry cough [FreeTextEntry7] : not too much indigestion [FreeTextEntry9] : left knee pain [de-identified] : 5-6 hours of sleep per night

## 2022-09-30 NOTE — HEALTH RISK ASSESSMENT
[Former] : Former [Yes] : Yes [2 - 4 times a month (2 pts)] : 2-4 times a month (2 points) [Employed] : employed [Graduate School] : graduate school [] :  [# Of Children ___] : has [unfilled] children [YearQuit] : 1980s [de-identified] : 1 beer [FreeTextEntry2] : M-Thursday [de-identified] : Eye Exam-needs to follow-up

## 2022-09-30 NOTE — PLAN
[FreeTextEntry1] : -declined flu vaccine today.\par \par On coumadin-check INR.\par \par Will follow up labwork drawn in office today.\par HM-needs colonoscopy.\par \par Keep food journal-possible food trigger causing symptoms, no chest pain or palpitations.  Following with cardio.\par Sleep Hygiene discussed, consistent sleep and wake schedule.  \par \par Dry cough-possibly related to lisinopril-will discuss with Cardio.  \par \par Will adjust meds based on labs. \par Patient expressed understanding of plan.\par

## 2022-09-30 NOTE — HISTORY OF PRESENT ILLNESS
[FreeTextEntry1] : Mr. ARMSTRONG presents for annual physical.\par  [de-identified] : Mr. 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 \par Colonoscopy over 5 years ago.  Saw Gastroenterology in [place]. \par -Right sided rib pain on and off. For about 1 month.   Maybe related to meals.  Usually doing two meals a day. \par \par Has to schedule echo and stress test.\par \par Sometimes waking up at night and is having difficulty falling back asleep. \par Declined flu shot today. \par

## 2022-10-03 LAB
25(OH)D3 SERPL-MCNC: 54.3 NG/ML
ALBUMIN SERPL ELPH-MCNC: 4.4 G/DL
ALP BLD-CCNC: 57 U/L
ALT SERPL-CCNC: 18 U/L
ANION GAP SERPL CALC-SCNC: 11 MMOL/L
APPEARANCE: CLEAR
AST SERPL-CCNC: 21 U/L
BASOPHILS # BLD AUTO: 0.02 K/UL
BASOPHILS NFR BLD AUTO: 0.4 %
BILIRUB SERPL-MCNC: 1.4 MG/DL
BILIRUBIN URINE: NEGATIVE
BLOOD URINE: NEGATIVE
BUN SERPL-MCNC: 20 MG/DL
CALCIUM SERPL-MCNC: 9.5 MG/DL
CHLORIDE SERPL-SCNC: 105 MMOL/L
CHOLEST SERPL-MCNC: 163 MG/DL
CO2 SERPL-SCNC: 26 MMOL/L
COLOR: YELLOW
CREAT SERPL-MCNC: 0.89 MG/DL
EGFR: 90 ML/MIN/1.73M2
EOSINOPHIL # BLD AUTO: 0.11 K/UL
EOSINOPHIL NFR BLD AUTO: 2.4 %
ESTIMATED AVERAGE GLUCOSE: 117 MG/DL
FERRITIN SERPL-MCNC: 270 NG/ML
FOLATE SERPL-MCNC: >20 NG/ML
GLUCOSE QUALITATIVE U: NEGATIVE
GLUCOSE SERPL-MCNC: 87 MG/DL
HBA1C MFR BLD HPLC: 5.7 %
HCT VFR BLD CALC: 46.3 %
HDLC SERPL-MCNC: 51 MG/DL
HGB BLD-MCNC: 14.9 G/DL
IMM GRANULOCYTES NFR BLD AUTO: 0.2 %
INR PPP: 2.76 RATIO
IRON SATN MFR SERPL: 17 %
IRON SERPL-MCNC: 48 UG/DL
KETONES URINE: ABNORMAL
LDLC SERPL CALC-MCNC: 97 MG/DL
LEUKOCYTE ESTERASE URINE: NEGATIVE
LYMPHOCYTES # BLD AUTO: 0.94 K/UL
LYMPHOCYTES NFR BLD AUTO: 20.2 %
MAN DIFF?: NORMAL
MCHC RBC-ENTMCNC: 29.7 PG
MCHC RBC-ENTMCNC: 32.2 GM/DL
MCV RBC AUTO: 92.4 FL
MONOCYTES # BLD AUTO: 0.4 K/UL
MONOCYTES NFR BLD AUTO: 8.6 %
NEUTROPHILS # BLD AUTO: 3.17 K/UL
NEUTROPHILS NFR BLD AUTO: 68.2 %
NITRITE URINE: NEGATIVE
NONHDLC SERPL-MCNC: 112 MG/DL
PH URINE: 5.5
PLATELET # BLD AUTO: 225 K/UL
POTASSIUM SERPL-SCNC: 5 MMOL/L
PROT SERPL-MCNC: 7 G/DL
PROTEIN URINE: NEGATIVE
PSA SERPL-MCNC: 0.46 NG/ML
PT BLD: 32.9 SEC
RBC # BLD: 5.01 M/UL
RBC # FLD: 13.8 %
SODIUM SERPL-SCNC: 141 MMOL/L
SPECIFIC GRAVITY URINE: 1.03
TIBC SERPL-MCNC: 289 UG/DL
TRANSFERRIN SERPL-MCNC: 238 MG/DL
TRIGL SERPL-MCNC: 78 MG/DL
TSH SERPL-ACNC: 0.66 UIU/ML
UIBC SERPL-MCNC: 241 UG/DL
UROBILINOGEN URINE: NORMAL
VIT B12 SERPL-MCNC: 471 PG/ML
WBC # FLD AUTO: 4.65 K/UL

## 2022-10-22 NOTE — ED PROVIDER NOTE - NS ED ATTENDING STATEMENT MOD
Negative
I have personally seen and examined this patient.  I have fully participated in the care of this patient. I have reviewed all pertinent clinical information, including history, physical exam, plan and the Resident’s note and agree except as noted.

## 2022-12-03 ENCOUNTER — APPOINTMENT (OUTPATIENT)
Dept: CARDIOLOGY | Facility: CLINIC | Age: 72
End: 2022-12-03

## 2023-01-23 ENCOUNTER — RX RENEWAL (OUTPATIENT)
Age: 73
End: 2023-01-23

## 2023-03-30 ENCOUNTER — RX RENEWAL (OUTPATIENT)
Age: 73
End: 2023-03-30

## 2023-03-31 ENCOUNTER — RX RENEWAL (OUTPATIENT)
Age: 73
End: 2023-03-31

## 2023-04-01 ENCOUNTER — APPOINTMENT (OUTPATIENT)
Dept: CARDIOLOGY | Facility: CLINIC | Age: 73
End: 2023-04-01
Payer: MEDICARE

## 2023-04-01 ENCOUNTER — NON-APPOINTMENT (OUTPATIENT)
Age: 73
End: 2023-04-01

## 2023-04-01 VITALS
OXYGEN SATURATION: 98 % | WEIGHT: 168 LBS | SYSTOLIC BLOOD PRESSURE: 99 MMHG | BODY MASS INDEX: 27.99 KG/M2 | DIASTOLIC BLOOD PRESSURE: 65 MMHG | HEIGHT: 65 IN | HEART RATE: 81 BPM | RESPIRATION RATE: 12 BRPM

## 2023-04-01 PROCEDURE — 85610 PROTHROMBIN TIME: CPT | Mod: QW

## 2023-04-01 PROCEDURE — 93283 PRGRMG EVAL IMPLANTABLE DFB: CPT

## 2023-04-01 PROCEDURE — 99214 OFFICE O/P EST MOD 30 MIN: CPT

## 2023-04-01 PROCEDURE — 93290 INTERROG DEV EVAL ICPMS IP: CPT | Mod: 26

## 2023-04-03 LAB
INR PPP: 2 RATIO
POCT-PROTHROMBIN TIME: 23.5 SECS
QUALITY CONTROL: YES

## 2023-04-03 NOTE — DISCUSSION/SUMMARY
[FreeTextEntry1] : Device is an ICD, Medtronic Evera XT  [id number].  His device is functioning normally.  He is set at AAI---DDD at a rate of 50 and an upper track rate of 130 beats per minute.  The atrial amplitude is set at 1.5 volts at a pulse width of 0.4 milliseconds.  The ventricular amplitude is set at 2 volts at a pulse width of 0.4 milliseconds.  He has a VT zone set at a rate between 176-200 beats per minute with Burst (2), Ramp (1), and 35 Joules x 4.  He has a VF zone set at a rate of >200 beats per minute where there is ATP during charging and 35 Joules x 6.\par \par He is atrial and ventricular sensed approximately 99% of the time. His OptiVol has been normal since his last interrogation.  \par \par He had a stored arrhythmia on 10/22/2022 that lasted for approximately 3 seconds (10 beats of nonsustained VT).   He had another episode on 2/21/2023 that lasted for approximately 2 seconds  (8 beats of nonsustained VT).\par \par There is adequate battery life with remaining longevity of 1.8 years.  The atrial impedance is 456 ohms, the ventricular impedance is 418 ohms, the RV defibrillation impedance is 44 ohms, and the SVC defibrillation impedance is 57 ohms.  The measured P wave is 2.4 mV and the measured R wave is 6.8 mV.  His atrial threshold was 0.625 volts @0.4ms and the right ventricular threshold was 1 volts @0.4ms.

## 2023-04-03 NOTE — PHYSICAL EXAM
[General Appearance - Well Developed] : well developed [Normal Appearance] : normal appearance [Well Groomed] : well groomed [General Appearance - Well Nourished] : well nourished [No Deformities] : no deformities [General Appearance - In No Acute Distress] : no acute distress [Normal Conjunctiva] : the conjunctiva exhibited no abnormalities [Normal Jugular Venous A Waves Present] : normal jugular venous A waves present [Normal Jugular Venous V Waves Present] : normal jugular venous V waves present [No Jugular Venous Carrillo A Waves] : no jugular venous carrillo A waves [Respiration, Rhythm And Depth] : normal respiratory rhythm and effort [Exaggerated Use Of Accessory Muscles For Inspiration] : no accessory muscle use [Auscultation Breath Sounds / Voice Sounds] : lungs were clear to auscultation bilaterally [Bowel Sounds] : normal bowel sounds [Abdomen Soft] : soft [Abdomen Tenderness] : non-tender [Abnormal Walk] : normal gait [Nail Clubbing] : no clubbing of the fingernails [Cyanosis, Localized] : no localized cyanosis [Petechial Hemorrhages (___cm)] : no petechial hemorrhages [Skin Color & Pigmentation] : normal skin color and pigmentation [] : no rash [No Skin Ulcers] : no skin ulcer [Oriented To Time, Place, And Person] : oriented to person, place, and time [Affect] : the affect was normal [Mood] : the mood was normal [No Anxiety] : not feeling anxious [Normal Rate] : normal [Rhythm Regular] : regular [Normal S1] : normal S1 [Normal S2] : normal S2 [No Murmur] : no murmurs heard [No Pitting Edema] : no pitting edema present [Normal Oral Mucosa] : normal oral mucosa [FreeTextEntry1] : Extraocular muscles intact.  Anicteric sclerae. [S3] : no S3 [Right Carotid Bruit] : no bruit heard over the right carotid [Left Carotid Bruit] : no bruit heard over the left carotid [Bruit] : no bruit heard

## 2023-04-05 ENCOUNTER — RX RENEWAL (OUTPATIENT)
Age: 73
End: 2023-04-05

## 2023-04-05 ENCOUNTER — TRANSCRIPTION ENCOUNTER (OUTPATIENT)
Age: 73
End: 2023-04-05

## 2023-04-06 ENCOUNTER — RX RENEWAL (OUTPATIENT)
Age: 73
End: 2023-04-06

## 2023-06-09 ENCOUNTER — APPOINTMENT (OUTPATIENT)
Dept: FAMILY MEDICINE | Facility: CLINIC | Age: 73
End: 2023-06-09
Payer: MEDICARE

## 2023-06-09 VITALS
HEIGHT: 65 IN | DIASTOLIC BLOOD PRESSURE: 76 MMHG | BODY MASS INDEX: 26.49 KG/M2 | HEART RATE: 87 BPM | WEIGHT: 159 LBS | TEMPERATURE: 98.3 F | OXYGEN SATURATION: 98 % | SYSTOLIC BLOOD PRESSURE: 116 MMHG

## 2023-06-09 LAB
BILIRUB UR QL STRIP: NEGATIVE
CLARITY UR: CLEAR
COLLECTION METHOD: NORMAL
GLUCOSE UR-MCNC: NEGATIVE
HCG UR QL: 0.2 EU/DL
HGB UR QL STRIP.AUTO: NORMAL
KETONES UR-MCNC: NEGATIVE
LEUKOCYTE ESTERASE UR QL STRIP: NEGATIVE
NITRITE UR QL STRIP: NEGATIVE
PH UR STRIP: 5.5
PROT UR STRIP-MCNC: NORMAL
SP GR UR STRIP: >=1.03

## 2023-06-09 PROCEDURE — 99214 OFFICE O/P EST MOD 30 MIN: CPT | Mod: 25

## 2023-06-09 PROCEDURE — 36415 COLL VENOUS BLD VENIPUNCTURE: CPT

## 2023-06-09 PROCEDURE — 81002 URINALYSIS NONAUTO W/O SCOPE: CPT

## 2023-06-09 NOTE — REVIEW OF SYSTEMS
[Fever] : no fever [Chills] : no chills [Nasal Discharge] : no nasal discharge [Sore Throat] : no sore throat [Chest Pain] : no chest pain [Palpitations] : no palpitations [Shortness Of Breath] : no shortness of breath [Wheezing] : no wheezing [Cough] : no cough [Abdominal Pain] : no abdominal pain [Nausea] : no nausea [Constipation] : no constipation [Diarrhea] : no diarrhea [Vomiting] : no vomiting [Headache] : no headache [FreeTextEntry8] : started seeing blood on Tuesday

## 2023-06-09 NOTE — PHYSICAL EXAM
[No Acute Distress] : no acute distress [Well Developed] : well developed [Normal Oropharynx] : the oropharynx was normal [No Edema] : there was no peripheral edema [No Extremity Clubbing/Cyanosis] : no extremity clubbing/cyanosis [No CVA Tenderness] : no CVA  tenderness [Normal] : affect was normal and insight and judgment were intact

## 2023-06-09 NOTE — PLAN
[FreeTextEntry1] : Will follow up labwork drawn in office today.\par Afib on coumadin-recheck INR. Patient has been holding. \par \par UA-blood on UA-clear yellow.   Uroloy evaluation.\par Stable exam. \par \par Discussed with Mr. ARMSTRONG precautions and advised to go to seek immediate medical re-evaluation if condition worsened.   JONNIE ARMSTRONG expressed understanding of the plan.\par

## 2023-06-09 NOTE — HISTORY OF PRESENT ILLNESS
[FreeTextEntry1] : Here for follow-up; needs med refills.\par  [de-identified] : Here for follow-up; needs med refills.\par \par Has been having bright red blood in the urine for 2 days.  \par Brings in sample of dark red urine.\par -Patient had stopped coumadin and aspirin for two days.  \par \par Never had to see Urologist. \par No back pain.  No urinary symptoms.  \par Took 1 Augmentin 875mg-last night.  \par No odor no symptoms. \par No history of urinary tract infection.  \par \par Otherwise has been feeling well.

## 2023-06-12 DIAGNOSIS — R31.9 HEMATURIA, UNSPECIFIED: ICD-10-CM

## 2023-06-12 LAB
ALBUMIN SERPL ELPH-MCNC: 4.1 G/DL
ALP BLD-CCNC: 55 U/L
ALT SERPL-CCNC: 18 U/L
ANION GAP SERPL CALC-SCNC: 11 MMOL/L
AST SERPL-CCNC: 22 U/L
BACTERIA UR CULT: NORMAL
BILIRUB SERPL-MCNC: 1.2 MG/DL
BUN SERPL-MCNC: 14 MG/DL
CALCIUM SERPL-MCNC: 8.9 MG/DL
CHLORIDE SERPL-SCNC: 105 MMOL/L
CHOLEST SERPL-MCNC: 161 MG/DL
CO2 SERPL-SCNC: 24 MMOL/L
CREAT SERPL-MCNC: 0.95 MG/DL
EGFR: 85 ML/MIN/1.73M2
ESTIMATED AVERAGE GLUCOSE: 120 MG/DL
FERRITIN SERPL-MCNC: 202 NG/ML
FOLATE SERPL-MCNC: >20 NG/ML
GLUCOSE SERPL-MCNC: 93 MG/DL
HBA1C MFR BLD HPLC: 5.8 %
HDLC SERPL-MCNC: 49 MG/DL
INR PPP: 1.79 RATIO
IRON SATN MFR SERPL: 21 %
IRON SERPL-MCNC: 54 UG/DL
LDLC SERPL CALC-MCNC: 96 MG/DL
NONHDLC SERPL-MCNC: 112 MG/DL
POTASSIUM SERPL-SCNC: 4.5 MMOL/L
PROT SERPL-MCNC: 7.1 G/DL
PSA SERPL-MCNC: 0.41 NG/ML
PT BLD: 21.2 SEC
SODIUM SERPL-SCNC: 140 MMOL/L
TIBC SERPL-MCNC: 261 UG/DL
TRANSFERRIN SERPL-MCNC: 215 MG/DL
TRIGL SERPL-MCNC: 80 MG/DL
TSH SERPL-ACNC: 1.23 UIU/ML
UIBC SERPL-MCNC: 208 UG/DL
VIT B12 SERPL-MCNC: 574 PG/ML

## 2023-06-19 ENCOUNTER — RX RENEWAL (OUTPATIENT)
Age: 73
End: 2023-06-19

## 2023-06-20 ENCOUNTER — NON-APPOINTMENT (OUTPATIENT)
Age: 73
End: 2023-06-20

## 2023-06-23 ENCOUNTER — APPOINTMENT (OUTPATIENT)
Dept: UROLOGY | Facility: CLINIC | Age: 73
End: 2023-06-23
Payer: MEDICARE

## 2023-06-23 VITALS
OXYGEN SATURATION: 98 % | WEIGHT: 165 LBS | RESPIRATION RATE: 12 BRPM | SYSTOLIC BLOOD PRESSURE: 95 MMHG | BODY MASS INDEX: 27.49 KG/M2 | TEMPERATURE: 97.2 F | DIASTOLIC BLOOD PRESSURE: 61 MMHG | HEIGHT: 65 IN | HEART RATE: 75 BPM

## 2023-06-23 DIAGNOSIS — R31.29 OTHER MICROSCOPIC HEMATURIA: ICD-10-CM

## 2023-06-23 PROCEDURE — 99202 OFFICE O/P NEW SF 15 MIN: CPT

## 2023-06-24 ENCOUNTER — RESULT REVIEW (OUTPATIENT)
Age: 73
End: 2023-06-24

## 2023-06-24 ENCOUNTER — APPOINTMENT (OUTPATIENT)
Dept: CT IMAGING | Facility: CLINIC | Age: 73
End: 2023-06-24
Payer: MEDICARE

## 2023-06-24 ENCOUNTER — OUTPATIENT (OUTPATIENT)
Dept: OUTPATIENT SERVICES | Facility: HOSPITAL | Age: 73
LOS: 1 days | End: 2023-06-24
Payer: MEDICARE

## 2023-06-24 DIAGNOSIS — Z95.810 PRESENCE OF AUTOMATIC (IMPLANTABLE) CARDIAC DEFIBRILLATOR: Chronic | ICD-10-CM

## 2023-06-24 DIAGNOSIS — R31.9 HEMATURIA, UNSPECIFIED: ICD-10-CM

## 2023-06-24 DIAGNOSIS — Z90.49 ACQUIRED ABSENCE OF OTHER SPECIFIED PARTS OF DIGESTIVE TRACT: Chronic | ICD-10-CM

## 2023-06-24 DIAGNOSIS — Z95.5 PRESENCE OF CORONARY ANGIOPLASTY IMPLANT AND GRAFT: Chronic | ICD-10-CM

## 2023-06-24 DIAGNOSIS — S82.90XA UNSPECIFIED FRACTURE OF UNSPECIFIED LOWER LEG, INITIAL ENCOUNTER FOR CLOSED FRACTURE: Chronic | ICD-10-CM

## 2023-06-24 LAB
APPEARANCE: CLEAR
BACTERIA: NEGATIVE /HPF
BILIRUBIN URINE: NEGATIVE
BLOOD URINE: NEGATIVE
CAST: 0 /LPF
COLOR: YELLOW
EPITHELIAL CELLS: 0 /HPF
GLUCOSE QUALITATIVE U: NEGATIVE MG/DL
KETONES URINE: NEGATIVE MG/DL
LEUKOCYTE ESTERASE URINE: NEGATIVE
MICROSCOPIC-UA: NORMAL
NITRITE URINE: NEGATIVE
PH URINE: 5.5
PROTEIN URINE: NEGATIVE MG/DL
RED BLOOD CELLS URINE: 1 /HPF
SPECIFIC GRAVITY URINE: 1.02
UROBILINOGEN URINE: 0.2 MG/DL
WHITE BLOOD CELLS URINE: 0 /HPF

## 2023-06-24 PROCEDURE — 74176 CT ABD & PELVIS W/O CONTRAST: CPT | Mod: 26

## 2023-06-24 PROCEDURE — 74176 CT ABD & PELVIS W/O CONTRAST: CPT

## 2023-06-25 LAB — BACTERIA UR CULT: NORMAL

## 2023-07-04 ENCOUNTER — TRANSCRIPTION ENCOUNTER (OUTPATIENT)
Age: 73
End: 2023-07-04

## 2023-07-04 LAB — URINE CYTOLOGY: NORMAL

## 2023-07-04 NOTE — PHYSICAL EXAM
[General Appearance - Well Developed] : well developed [General Appearance - Well Nourished] : well nourished [Normal Appearance] : normal appearance [Well Groomed] : well groomed [General Appearance - In No Acute Distress] : no acute distress [Edema] : no peripheral edema [Respiration, Rhythm And Depth] : normal respiratory rhythm and effort [Exaggerated Use Of Accessory Muscles For Inspiration] : no accessory muscle use [Abdomen Soft] : soft [Abdomen Tenderness] : non-tender [Costovertebral Angle Tenderness] : no ~M costovertebral angle tenderness [Urethral Meatus] : meatus normal [Urinary Bladder Findings] : the bladder was normal on palpation [Scrotum] : the scrotum was normal [Testes Mass (___cm)] : there were no testicular masses [No Prostate Nodules] : no prostate nodules [Normal Station and Gait] : the gait and station were normal for the patient's age [] : no rash [No Focal Deficits] : no focal deficits [Oriented To Time, Place, And Person] : oriented to person, place, and time [Affect] : the affect was normal [Mood] : the mood was normal [Not Anxious] : not anxious [No Palpable Adenopathy] : no palpable adenopathy [FreeTextEntry1] : Kidney percussion test negative bilaterally, no pain reported on bi-manual palpation bilaterally, no pain on superficial and deep palpation on the iliac fossa bilaterally and on the ureteral points

## 2023-07-04 NOTE — ASSESSMENT
[FreeTextEntry1] : 73y/o male with incidental finding of microhematuria. \par Denies smoking and eating spicy/chocolate/citrus.\par Refers poor hydration.\par \par Kidney percussion test negative bilaterally, no pain reported on bi-manual palpation bilaterally, no pain on superficial and deep palpation on the iliac fossa bilaterally and on the ureteral points \par \par Collecting urine sample for cytology, urinalysis and culture.\par All possible causes of hematuria were discussed, including UTIs, Urinary tract stones, Bladder/Kidney masses.\par \par Will F/U with CT and cystoscopy in case of evidence of blood in the UA

## 2023-07-04 NOTE — ASSESSMENT
[FreeTextEntry1] : 71y/o male with incidental finding of microhematuria. \par Denies smoking and eating spicy/chocolate/citrus.\par Refers poor hydration.\par \par Kidney percussion test negative bilaterally, no pain reported on bi-manual palpation bilaterally, no pain on superficial and deep palpation on the iliac fossa bilaterally and on the ureteral points \par \par Collecting urine sample for cytology, urinalysis and culture.\par All possible causes of hematuria were discussed, including UTIs, Urinary tract stones, Bladder/Kidney masses.\par \par Will F/U with CT and cystoscopy in case of evidence of blood in the UA

## 2023-07-06 ENCOUNTER — OUTPATIENT (OUTPATIENT)
Dept: OUTPATIENT SERVICES | Facility: HOSPITAL | Age: 73
LOS: 1 days | Discharge: HOME | End: 2023-07-06

## 2023-07-06 DIAGNOSIS — K92.2 GASTROINTESTINAL HEMORRHAGE, UNSPECIFIED: ICD-10-CM

## 2023-07-06 DIAGNOSIS — S82.90XA UNSPECIFIED FRACTURE OF UNSPECIFIED LOWER LEG, INITIAL ENCOUNTER FOR CLOSED FRACTURE: Chronic | ICD-10-CM

## 2023-07-06 DIAGNOSIS — Z90.49 ACQUIRED ABSENCE OF OTHER SPECIFIED PARTS OF DIGESTIVE TRACT: Chronic | ICD-10-CM

## 2023-07-06 DIAGNOSIS — Z95.5 PRESENCE OF CORONARY ANGIOPLASTY IMPLANT AND GRAFT: Chronic | ICD-10-CM

## 2023-07-06 DIAGNOSIS — Z95.810 PRESENCE OF AUTOMATIC (IMPLANTABLE) CARDIAC DEFIBRILLATOR: Chronic | ICD-10-CM

## 2023-07-07 ENCOUNTER — NON-APPOINTMENT (OUTPATIENT)
Age: 73
End: 2023-07-07

## 2023-07-10 ENCOUNTER — APPOINTMENT (OUTPATIENT)
Dept: CT IMAGING | Facility: IMAGING CENTER | Age: 73
End: 2023-07-10
Payer: MEDICARE

## 2023-07-10 ENCOUNTER — NON-APPOINTMENT (OUTPATIENT)
Age: 73
End: 2023-07-10

## 2023-07-10 ENCOUNTER — OUTPATIENT (OUTPATIENT)
Dept: OUTPATIENT SERVICES | Facility: HOSPITAL | Age: 73
LOS: 1 days | End: 2023-07-10
Payer: MEDICARE

## 2023-07-10 ENCOUNTER — RESULT REVIEW (OUTPATIENT)
Age: 73
End: 2023-07-10

## 2023-07-10 ENCOUNTER — APPOINTMENT (OUTPATIENT)
Dept: HEMATOLOGY ONCOLOGY | Facility: CLINIC | Age: 73
End: 2023-07-10
Payer: MEDICARE

## 2023-07-10 VITALS
BODY MASS INDEX: 27.78 KG/M2 | WEIGHT: 158.73 LBS | TEMPERATURE: 97.3 F | HEIGHT: 63.39 IN | SYSTOLIC BLOOD PRESSURE: 114 MMHG | HEART RATE: 97 BPM | DIASTOLIC BLOOD PRESSURE: 80 MMHG | RESPIRATION RATE: 16 BRPM | OXYGEN SATURATION: 96 %

## 2023-07-10 DIAGNOSIS — S82.90XA UNSPECIFIED FRACTURE OF UNSPECIFIED LOWER LEG, INITIAL ENCOUNTER FOR CLOSED FRACTURE: Chronic | ICD-10-CM

## 2023-07-10 DIAGNOSIS — Z80.1 FAMILY HISTORY OF MALIGNANT NEOPLASM OF TRACHEA, BRONCHUS AND LUNG: ICD-10-CM

## 2023-07-10 DIAGNOSIS — C78.6 SECONDARY MALIGNANT NEOPLASM OF RETROPERITONEUM AND PERITONEUM: ICD-10-CM

## 2023-07-10 DIAGNOSIS — Z90.49 ACQUIRED ABSENCE OF OTHER SPECIFIED PARTS OF DIGESTIVE TRACT: Chronic | ICD-10-CM

## 2023-07-10 DIAGNOSIS — Z86.79 PERSONAL HISTORY OF OTHER DISEASES OF THE CIRCULATORY SYSTEM: ICD-10-CM

## 2023-07-10 DIAGNOSIS — Z95.5 PRESENCE OF CORONARY ANGIOPLASTY IMPLANT AND GRAFT: Chronic | ICD-10-CM

## 2023-07-10 PROCEDURE — 74178 CT ABD&PLV WO CNTR FLWD CNTR: CPT

## 2023-07-10 PROCEDURE — 71260 CT THORAX DX C+: CPT | Mod: 26

## 2023-07-10 PROCEDURE — 74178 CT ABD&PLV WO CNTR FLWD CNTR: CPT | Mod: 26

## 2023-07-10 PROCEDURE — 99205 OFFICE O/P NEW HI 60 MIN: CPT

## 2023-07-10 PROCEDURE — 71260 CT THORAX DX C+: CPT

## 2023-07-10 NOTE — PHYSICAL EXAM
[Fully active, able to carry on all pre-disease performance without restriction] : Status 0 - Fully active, able to carry on all pre-disease performance without restriction [Normal] : affect appropriate raising voice/Other

## 2023-07-11 NOTE — REVIEW OF SYSTEMS
[Recent Change In Weight] : ~T recent weight change [Diarrhea: Grade 0] : Diarrhea: Grade 0 [Negative] : Allergic/Immunologic [Cough] : cough [Joint Pain] : joint pain [Fever] : no fever [Chills] : no chills [Fatigue] : no fatigue [Shortness Of Breath] : no shortness of breath [Wheezing] : no wheezing [SOB on Exertion] : no shortness of breath during exertion [Joint Stiffness] : no joint stiffness [Muscle Pain] : no muscle pain [FreeTextEntry9] : left knee pain

## 2023-07-11 NOTE — REASON FOR VISIT
[Initial Consultation] : an initial consultation [FreeTextEntry2] : peritoneal carcinomatosis with unknown primary

## 2023-07-11 NOTE — HISTORY OF PRESENT ILLNESS
[de-identified] : Shiva Arroyo is a 72 years old male with history of ischemic CHF s/p stent placement x2, A Fib on coumadin and B12 deficiency\par \par presented with gross hematuria for two days in the end of June. It was resolved after stopping coumadin. He restarted coumadin 3 days after its discontinuation. \par \par 6/24/23 CT renal stone hunt showed left upper quadrant mesenteric and omental nodules suspicious of carcinomatosis, a few nodules in the deep pelvis. In September 2018 CT abdomen and pelvis showed a left mesenteric node decreased to 1.1cm.  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 His family lives in [place]. He comes back in the weekend. He loses 20lbs in one and a half years by intermittent fasting. Denies any gross hematuria and abdominal pain.

## 2023-07-12 NOTE — HISTORY OF PRESENT ILLNESS
[FreeTextEntry1] : 71y/o male with incidental finding of microhematuria. \par Denies smoking and eating spicy/chocolate/citrus.\par Refers poor hydration.\par \par  [de-identified] : Shiva Arroyo is a 72 years old male with history of CHF, A Fib on coumadin and B12 deficiency\par found to have gross hematuria \par \par 6/24/23 CT renal stone protocol showed numerous soft tissue nodules predominantly in the left upper quadrant mesenteric fat and in the omentum and a few more in the deep pelvis which are consistent with carcinomatosis.  The CT abdomen and pelvis in September 2018  noted left upper quadrant mesenteric lymph node is again seen and is calcified and slightly smaller measuring 1.1 cm.

## 2023-07-12 NOTE — HISTORY OF PRESENT ILLNESS
[FreeTextEntry1] : 73y/o male with incidental finding of microhematuria. \par Denies smoking and eating spicy/chocolate/citrus.\par Refers poor hydration.\par \par  [de-identified] : Shiva Arroyo is a 72 years old male with history of CHF, A Fib on coumadin and B12 deficiency\par found to have gross hematuria \par \par 6/24/23 CT renal stone protocol showed numerous soft tissue nodules predominantly in the left upper quadrant mesenteric fat and in the omentum and a few more in the deep pelvis which are consistent with carcinomatosis.  The CT abdomen and pelvis in September 2018  noted left upper quadrant mesenteric lymph node is again seen and is calcified and slightly smaller measuring 1.1 cm.

## 2023-07-13 ENCOUNTER — NON-APPOINTMENT (OUTPATIENT)
Age: 73
End: 2023-07-13

## 2023-07-28 ENCOUNTER — RESULT REVIEW (OUTPATIENT)
Age: 73
End: 2023-07-28

## 2023-07-28 ENCOUNTER — OUTPATIENT (OUTPATIENT)
Dept: OUTPATIENT SERVICES | Facility: HOSPITAL | Age: 73
LOS: 1 days | End: 2023-07-28
Payer: MEDICARE

## 2023-07-28 ENCOUNTER — APPOINTMENT (OUTPATIENT)
Dept: ULTRASOUND IMAGING | Facility: HOSPITAL | Age: 73
End: 2023-07-28

## 2023-07-28 DIAGNOSIS — Z95.810 PRESENCE OF AUTOMATIC (IMPLANTABLE) CARDIAC DEFIBRILLATOR: Chronic | ICD-10-CM

## 2023-07-28 DIAGNOSIS — Z95.5 PRESENCE OF CORONARY ANGIOPLASTY IMPLANT AND GRAFT: Chronic | ICD-10-CM

## 2023-07-28 DIAGNOSIS — Z90.49 ACQUIRED ABSENCE OF OTHER SPECIFIED PARTS OF DIGESTIVE TRACT: Chronic | ICD-10-CM

## 2023-07-28 DIAGNOSIS — S82.90XA UNSPECIFIED FRACTURE OF UNSPECIFIED LOWER LEG, INITIAL ENCOUNTER FOR CLOSED FRACTURE: Chronic | ICD-10-CM

## 2023-07-28 DIAGNOSIS — C78.6 SECONDARY MALIGNANT NEOPLASM OF RETROPERITONEUM AND PERITONEUM: ICD-10-CM

## 2023-07-28 LAB
ALBUMIN SERPL ELPH-MCNC: 4 G/DL
ALP BLD-CCNC: 59 U/L
ALT SERPL-CCNC: 14 U/L
ANION GAP SERPL CALC-SCNC: 11 MMOL/L
APTT BLD: 29.7 SEC
AST SERPL-CCNC: 17 U/L
BILIRUB SERPL-MCNC: 0.9 MG/DL
BUN SERPL-MCNC: 20 MG/DL
CALCIUM SERPL-MCNC: 8.8 MG/DL
CHLORIDE SERPL-SCNC: 104 MMOL/L
CO2 SERPL-SCNC: 23 MMOL/L
CREAT SERPL-MCNC: 1.03 MG/DL
EGFR: 77 ML/MIN/1.73M2
GLUCOSE SERPL-MCNC: 115 MG/DL
INR PPP: 1.06 RATIO
POTASSIUM SERPL-SCNC: 4.6 MMOL/L
PROT SERPL-MCNC: 6.6 G/DL
PT BLD: 12 SEC
SODIUM SERPL-SCNC: 138 MMOL/L

## 2023-07-28 PROCEDURE — 76942 ECHO GUIDE FOR BIOPSY: CPT

## 2023-07-28 PROCEDURE — 49180 BIOPSY ABDOMINAL MASS: CPT

## 2023-07-28 PROCEDURE — 88305 TISSUE EXAM BY PATHOLOGIST: CPT

## 2023-07-28 PROCEDURE — 76942 ECHO GUIDE FOR BIOPSY: CPT | Mod: 26

## 2023-07-28 PROCEDURE — 88305 TISSUE EXAM BY PATHOLOGIST: CPT | Mod: 26

## 2023-08-02 LAB
NON-GYNECOLOGICAL CYTOLOGY STUDY: SIGNIFICANT CHANGE UP
NON-GYNECOLOGICAL CYTOLOGY STUDY: SIGNIFICANT CHANGE UP

## 2023-08-11 ENCOUNTER — TRANSCRIPTION ENCOUNTER (OUTPATIENT)
Age: 73
End: 2023-08-11

## 2023-08-11 ENCOUNTER — APPOINTMENT (OUTPATIENT)
Dept: HEMATOLOGY ONCOLOGY | Facility: CLINIC | Age: 73
End: 2023-08-11
Payer: MEDICARE

## 2023-08-11 PROCEDURE — 99214 OFFICE O/P EST MOD 30 MIN: CPT

## 2023-08-11 NOTE — REVIEW OF SYSTEMS
[Diarrhea: Grade 0] : Diarrhea: Grade 0 [Negative] : Allergic/Immunologic Terbinafine Counseling: Patient counseling regarding adverse effects of terbinafine including but not limited to headache, diarrhea, rash, upset stomach, liver function test abnormalities, itching, taste/smell disturbance, nausea, abdominal pain, and flatulence.  There is a rare possibility of liver failure that can occur when taking terbinafine.  The patient understands that a baseline LFT and kidney function test may be required. The patient verbalized understanding of the proper use and possible adverse effects of terbinafine.  All of the patient's questions and concerns were addressed.

## 2023-08-12 NOTE — REASON FOR VISIT
[Follow-Up Visit] : a follow-up [Spouse] : spouse [FreeTextEntry2] : peritoneal carcinomatosis; mucinous adenocarcinoma low grade

## 2023-08-12 NOTE — PHYSICAL EXAM
[Fully active, able to carry on all pre-disease performance without restriction] : Status 0 - Fully active, able to carry on all pre-disease performance without restriction [Normal] : affect appropriate [de-identified] : Biopsy site no bleeding/erythema/swelling

## 2023-08-12 NOTE — ASSESSMENT
[FreeTextEntry1] : Peritoneal carcinomatosis (197.6) (C78.6)  Shiva Arroyo is a 72 years old male who had the initial medical oncology consultation on 7/10/23. He initially presented with one episode of gross hematuria, on 6/24/23 CT renal stone hunt incidentally revealed left mesenteric and omental nodules, In September 2018 CT abdomen and pelvis showed a left mesenteric node decreased to 1.1cm, however, no obvious other lesions to suggest primary site.  He had history of left inguinal hernia for several years s/p 8/26/19 left inguinal hernia repair with medium oval. and he followed up with surgery Dr. Tam for that hernia repair.   To be noted, per pathology report, he had appendectomy due to perforated appendicitis on 9/23/2018 at [facility] with pathology showing  1. Appendix, laparoscopic appendectomy: - Low-grade appendiceal mucinous neoplasm in a background of perforation, acute and chronic appendicitis, extensive ulceration, dense appendiceal wall fibrosis and acute periappendicitis:  Focal extra-appendiceal acellular mucin is identified. Primary Tumor pT4a: Acellular mucin focally involving the serosa of the appendix or mesoappendix in background of perforation (slide 1M1) (pT4a). - Resection margin negative for dysplasia and malignancy. lymph-vascular invasion and tmor deposits not identified. no regional lymph nodes submitted or found.     Given incidental findings on scans, he will need better images CT urogram and CT chest to complete staging work up given his gross hematuria history although he was on coumadin. He will need to be arranged for IR guided a biopsy for his left upper quadrant mesenteric and omental lesions.    - 7/10/23 CT chest with IV contrast; CT abd/p urogram with IV contrast showing Extensive carcinomatosis including a small focus of soft tissue herniating through the anterior abdominal wall likely small focal hernia. Etiology for carcinomatosis not clearly delineated. Small pulmonary nodule unclear significance. Bilateral renal cysts, including large right renal cyst measuring 8 cm; no hydronephrosis. No solid renal masses.  -S/p IR ultrasound guided left upper quadrant omental mass core biopsy 7/28/23 with pathology showing positive for MALIGNANT CELLS: Mucinous carcinoma, low grade, Grade 1.   8/11/23 Discussed with pt and his wife, and also called his daughter Jason who is a peditrician on the phone  about the pathology result from omental mass core biopsy on 7/28/23. In view of his grade 1 metastatic adenocarcinoma, We recommend local therapy including CRS (debulking) by Dr. Silver and HIPEC (Hyperthermic intraperitoneal chemotherapy) during the surgery. After the surgery, will repeat CT and if there still disease there, may consider Radiation therapy if residual diseases are found and potential chemotherapy following with surveillance scans.   Plan  -Reviewed lab on 7/27/23 including CBC CMP, and 6/23 Urinalysis urine culture- insignificant   -Will refer to Onc surgery Dr. Cordell Silver RTC in 2.5 month.

## 2023-08-12 NOTE — HISTORY OF PRESENT ILLNESS
[de-identified] : Shiva Arroyo is a 72 years old male with past medical history of ischemic CHF s/p stent placement x2, A Fib on coumadin and B12 deficiency. 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.   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  The initial medical oncology consultation on 7/10/23: He presented with gross hematuria for two days in the end of June 2023. It was resolved after stopping coumadin. He restarted coumadin 3 days after its discontinuation.  6/24/23 CT renal stone hunt showed left upper quadrant mesenteric and omental nodules suspicious of carcinomatosis, a few nodules in the deep pelvis. In September 2018 CT abdomen and pelvis showed a left mesenteric node decreased to 1.1cm.  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 His family lives in [place]. He comes back in the weekend. He loses 20lbs in one and a half years by intermittent fasting. Denies any gross hematuria and abdominal pain.   [de-identified] : 8/11/23 - 7/10/23 CT chest with IV contrast; CT abd/p urogram with IV contrast showing Extensive carcinomatosis including a small focus of soft tissue herniating through the anterior abdominal wall likely small focal hernia. Etiology for carcinomatosis not clearly delineated. Small pulmonary nodule unclear significance. Bilateral renal cysts, including large right renal cyst measuring 8 cm; no hydronephrosis. No solid renal masses.  -S/p IR ultrasound guided left upper quadrant omental mass core biopsy 7/28/23 with pathology showing positive for MALIGNANT CELLS: Mucinous carcinoma, low grade, G1.  He denies N/V/D, constipation, abd pain, melena/hematochezia, fever/chills, night sweats. Denied hematuria. Appetite is ok. He refuses vital signs during the visit.

## 2023-08-17 ENCOUNTER — APPOINTMENT (OUTPATIENT)
Dept: SURGICAL ONCOLOGY | Facility: CLINIC | Age: 73
End: 2023-08-17
Payer: MEDICARE

## 2023-08-17 ENCOUNTER — NON-APPOINTMENT (OUTPATIENT)
Age: 73
End: 2023-08-17

## 2023-08-17 VITALS
HEIGHT: 63 IN | TEMPERATURE: 97.5 F | HEART RATE: 75 BPM | WEIGHT: 160.83 LBS | DIASTOLIC BLOOD PRESSURE: 75 MMHG | SYSTOLIC BLOOD PRESSURE: 115 MMHG | BODY MASS INDEX: 28.5 KG/M2

## 2023-08-17 PROCEDURE — 99205 OFFICE O/P NEW HI 60 MIN: CPT

## 2023-08-25 NOTE — REASON FOR VISIT
[Initial Consultation] : an initial consultation for [Referred By: ___] : Referred By: [unfilled] [FreeTextEntry2] : peritoneal carcinomatosis; mucinous adenocarcinoma low grade

## 2023-08-25 NOTE — ASSESSMENT
[FreeTextEntry1] : Peritoneal carcinomatosis (197.6) (C78.6)  Shiva Arroyo is a 72 years old male who had the initial medical oncology consultation on 7/10/23. He initially presented with one episode of gross hematuria, on 6/24/23 CT renal stone hunt incidentally revealed left mesenteric and omental nodules, In September 2018 CT abdomen and pelvis showed a left mesenteric node decreased to 1.1cm, however, no obvious other lesions to suggest primary site.  He had history of left inguinal hernia for several years s/p 8/26/19 left inguinal hernia repair with medium oval. and he followed up with surgery Dr. Tam for that hernia repair.   To be noted, per pathology report, he had appendectomy due to perforated appendicitis on 9/23/2018 at [facility] with pathology showing  1. Appendix, laparoscopic appendectomy: - Low-grade appendiceal mucinous neoplasm in a background of perforation, acute and chronic appendicitis, extensive ulceration, dense appendiceal wall fibrosis and acute periappendicitis:  Focal extra-appendiceal acellular mucin is identified. Primary Tumor pT4a: Acellular mucin focally involving the serosa of the appendix or mesoappendix in background of perforation (slide 1M1) (pT4a). - Resection margin negative for dysplasia and malignancy. lymph-vascular invasion and tmor deposits not identified. no regional lymph nodes submitted or found.     Given incidental findings on scans, he will need better images CT urogram and CT chest to complete staging work up given his gross hematuria history although he was on coumadin. He will need to be arranged for IR guided a biopsy for his left upper quadrant mesenteric and omental lesions.    - 7/10/23 CT chest with IV contrast; CT abd/p urogram with IV contrast showing Extensive carcinomatosis including a small focus of soft tissue herniating through the anterior abdominal wall likely small focal hernia. Etiology for carcinomatosis not clearly delineated. Small pulmonary nodule unclear significance. Bilateral renal cysts, including large right renal cyst measuring 8 cm; no hydronephrosis. No solid renal masses.  -S/p IR ultrasound guided left upper quadrant omental mass core biopsy 7/28/23 with pathology showing positive for MALIGNANT CELLS: Mucinous carcinoma, low grade, Grade 1.   8/11/23 Discussed with pt and his wife, and also called his daughter Jason who is a peditrician on the phone  about the pathology result from omental mass core biopsy on 7/28/23. In view of his grade 1 metastatic adenocarcinoma, We recommend local therapy including CRS (debulking) by Dr. Silver and HIPEC (Hyperthermic intraperitoneal chemotherapy) during the surgery. After the surgery, will repeat CT and if there still disease there, may consider Radiation therapy if residual diseases are found and potential chemotherapy following with surveillance scans.   A lengthy discussion was had about the surgery. All risks, benefits and alternatives to the recommended surgical procedure were discussed which include, but are not limited to bleeding, infection, nerve damage, vascular damage, failure of the wound to heal, the need for further surgery, loss of limb, DVT, PE, loss of life as well as the risks associated with general anesthesia. The patient verbalized understanding and provided informed consent to move forward with the surgery.  Plan  1) Optimal Cytoreduction w/ HIPEC. We discussed the risks, benefits, and alternatives of the procedure with the patient, he expressed understanding and agree to proceed with exploratory laparotomy, extensive tumor debulking, heated intraperitoneal chemotherapy, possible bowel resection, possible ostomy

## 2023-08-25 NOTE — PHYSICAL EXAM
[Fully active, able to carry on all pre-disease performance without restriction] : Status 0 - Fully active, able to carry on all pre-disease performance without restriction [Normal] : affect appropriate [de-identified] : Biopsy site no bleeding/erythema/swelling

## 2023-08-25 NOTE — HISTORY OF PRESENT ILLNESS
[de-identified] : Shiva Arroyo is a 72 years old male with past medical history of ischemic CHF s/p stent placement x2, A Fib on coumadin and B12 deficiency. 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 Montilla.  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  For CHF and AFib, patient was taking coumadin and aspirin. Patient was experiencing hematuria, which was suspected to be secondary to Coumadin. Upon discontuining coumadin, hematuria resolved. PCP referred patient to urology, who ordered a CT for the pt in lieu of the hematuria. Carcinoma was incidentally found. Biopsy was performed and confirmed low grade appendiceal neoplasm.  6/24/23 CT renal stone hunt showed left upper quadrant mesenteric and omental nodules suspicious of carcinomatosis, a few nodules in the deep pelvis. In September 2018 CT abdomen and pelvis showed a left mesenteric node decreased to 1.1cm.  The initial medical oncology consultation on 7/10/23: He presented with gross hematuria for two days in the end of June 2023. It was resolved after stopping coumadin. He restarted coumadin 3 days after its discontinuation.  PMHx: ASHD; CHF; HLD; HTN; Left inguinal hernia; Vitamin D & B12 Deficiency; BPH; ischemic cardiomyopathy; sepsis PSHx: Appendectomy; cath stent placement; cholecystectomy; ICD surgery; inguinal hernia repair; leg osteotomy tibia and fibula FMHx: Father - DM, stroke, MI, CAD; Mother - HTN; Brother - lung CA SHx: Employed, dentist; social alcohol use; former smoker; caffeine use;

## 2023-08-25 NOTE — END OF VISIT
[FreeTextEntry2] :  This note was written by Ksenia Aceves on 08/17/2023, acting solely as a scribe for Dr. Cordell Silver MD.  All medical record entries made by the scribe, Ksenia Aceves, were at my, Dr. Cordell Silver MD, direction and personally dictated by me during today's encounter. I have personally reviewed the chart and agree that the record accurately reflects my personal performance and care.

## 2023-09-05 ENCOUNTER — TRANSCRIPTION ENCOUNTER (OUTPATIENT)
Age: 73
End: 2023-09-05

## 2023-09-07 ENCOUNTER — RX RENEWAL (OUTPATIENT)
Age: 73
End: 2023-09-07

## 2023-09-08 RX ORDER — NEOMYCIN SULFATE 500 MG/1
500 TABLET ORAL
Qty: 3 | Refills: 0 | Status: ACTIVE | COMMUNITY
Start: 2023-09-08 | End: 1900-01-01

## 2023-09-08 RX ORDER — PEG-3350, SODIUM SULFATE, SODIUM CHLORIDE, POTASSIUM CHLORIDE, SODIUM ASCORBATE AND ASCORBIC ACID 7.5-2.691G
100 KIT ORAL
Qty: 1 | Refills: 0 | Status: ACTIVE | COMMUNITY
Start: 2023-09-08 | End: 1900-01-01

## 2023-09-09 ENCOUNTER — APPOINTMENT (OUTPATIENT)
Dept: CARDIOLOGY | Facility: CLINIC | Age: 73
End: 2023-09-09
Payer: MEDICARE

## 2023-09-09 ENCOUNTER — NON-APPOINTMENT (OUTPATIENT)
Age: 73
End: 2023-09-09

## 2023-09-09 VITALS
BODY MASS INDEX: 28.7 KG/M2 | RESPIRATION RATE: 12 BRPM | SYSTOLIC BLOOD PRESSURE: 109 MMHG | OXYGEN SATURATION: 95 % | DIASTOLIC BLOOD PRESSURE: 73 MMHG | HEIGHT: 63 IN | HEART RATE: 86 BPM | WEIGHT: 162 LBS

## 2023-09-09 DIAGNOSIS — I25.10 ATHEROSCLEROTIC HEART DISEASE OF NATIVE CORONARY ARTERY W/OUT ANGINA PECTORIS: ICD-10-CM

## 2023-09-09 LAB — INR PPP: 2.8 RATIO

## 2023-09-09 PROCEDURE — 93283 PRGRMG EVAL IMPLANTABLE DFB: CPT

## 2023-09-09 PROCEDURE — 93000 ELECTROCARDIOGRAM COMPLETE: CPT | Mod: 59

## 2023-09-09 PROCEDURE — 99215 OFFICE O/P EST HI 40 MIN: CPT | Mod: 25

## 2023-09-09 PROCEDURE — 85610 PROTHROMBIN TIME: CPT | Mod: QW

## 2023-09-09 NOTE — CARDIOLOGY SUMMARY
[LVEF ___%] : LVEF [unfilled]% [___] : [unfilled] [de-identified] : Sinus Rhythm with a first degree A-V block at 83 beats per minute with frequent pvcs in a pattern of ventricular trigeminy, poor R wave progression, and low voltage QRS

## 2023-09-09 NOTE — DISCUSSION/SUMMARY
[EKG obtained to assist in diagnosis and management of assessed problem(s)] : EKG obtained to assist in diagnosis and management of assessed problem(s) [FreeTextEntry1] : Device is an ICD, Medtronic Evera XT DR [id number].  His device is functioning normally.  He is set at AAI---DDD at a rate of 50 and an upper track rate of 130 beats per minute.  The atrial amplitude is set at 1.5 volts at a pulse width of 0.4 milliseconds.  The ventricular amplitude is set at 2 volts at a pulse width of 0.4 milliseconds.  He has a VT zone set at a rate between 176-200 beats per minute with Burst (2), Ramp (1), and 35 Joules x 4.  He has a VF zone set at a rate of >200 beats per minute where there is ATP during charging and 35 Joules x 6.  He is atrial and ventricular sensed approximately 99% of the time. He is V-paced about 1% of the time. His OptiVol was elevated between 6/9 and 6/20., but has been normal since then.     He had a stored arrhythmia on 9/6/2023 that lasted for about 5 and a half hours and most likely atrial fibrillation.   There is adequate battery life with remaining longevity of 15 months.  The atrial impedance is 456 ohms, the ventricular impedance is 418 ohms, the RV defibrillation impedance is 44 ohms, and the SVC defibrillation impedance is 55 ohms.  The measured P wave is 2.5 mV and the measured R wave is 5.4 mV.  His atrial threshold was 0.75 volts @0.4ms and the right ventricular threshold was 0.875 volts @0.4ms.

## 2023-09-09 NOTE — PHYSICAL EXAM
[General Appearance - Well Developed] : well developed [Normal Appearance] : normal appearance [Well Groomed] : well groomed [General Appearance - Well Nourished] : well nourished [No Deformities] : no deformities [General Appearance - In No Acute Distress] : no acute distress [Normal Conjunctiva] : the conjunctiva exhibited no abnormalities [Normal Oral Mucosa] : normal oral mucosa [Normal Jugular Venous A Waves Present] : normal jugular venous A waves present [Normal Jugular Venous V Waves Present] : normal jugular venous V waves present [No Jugular Venous Carrillo A Waves] : no jugular venous carrillo A waves [Respiration, Rhythm And Depth] : normal respiratory rhythm and effort [Exaggerated Use Of Accessory Muscles For Inspiration] : no accessory muscle use [Auscultation Breath Sounds / Voice Sounds] : lungs were clear to auscultation bilaterally [Bowel Sounds] : normal bowel sounds [Abdomen Soft] : soft [Abdomen Tenderness] : non-tender [Abnormal Walk] : normal gait [Nail Clubbing] : no clubbing of the fingernails [Cyanosis, Localized] : no localized cyanosis [Petechial Hemorrhages (___cm)] : no petechial hemorrhages [Skin Color & Pigmentation] : normal skin color and pigmentation [] : no rash [No Skin Ulcers] : no skin ulcer [Oriented To Time, Place, And Person] : oriented to person, place, and time [Affect] : the affect was normal [Mood] : the mood was normal [No Anxiety] : not feeling anxious [Rhythm Regular] : regular [Normal Rate] : normal [Normal S1] : normal S1 [Normal S2] : normal S2 [No Murmur] : no murmurs heard [No Pitting Edema] : no pitting edema present [FreeTextEntry1] : Extraocular muscles intact.  Anicteric sclerae. [S3] : no S3 [Right Carotid Bruit] : no bruit heard over the right carotid [Left Carotid Bruit] : no bruit heard over the left carotid [Bruit] : no bruit heard

## 2023-09-22 ENCOUNTER — APPOINTMENT (OUTPATIENT)
Dept: CV DIAGNOSTICS | Facility: HOSPITAL | Age: 73
End: 2023-09-22

## 2023-09-22 ENCOUNTER — OUTPATIENT (OUTPATIENT)
Dept: OUTPATIENT SERVICES | Facility: HOSPITAL | Age: 73
LOS: 1 days | End: 2023-09-22
Payer: MEDICARE

## 2023-09-22 ENCOUNTER — APPOINTMENT (OUTPATIENT)
Dept: CV DIAGNOSITCS | Facility: HOSPITAL | Age: 73
End: 2023-09-22

## 2023-09-22 DIAGNOSIS — S82.90XA UNSPECIFIED FRACTURE OF UNSPECIFIED LOWER LEG, INITIAL ENCOUNTER FOR CLOSED FRACTURE: Chronic | ICD-10-CM

## 2023-09-22 DIAGNOSIS — Z95.5 PRESENCE OF CORONARY ANGIOPLASTY IMPLANT AND GRAFT: Chronic | ICD-10-CM

## 2023-09-22 DIAGNOSIS — I25.10 ATHEROSCLEROTIC HEART DISEASE OF NATIVE CORONARY ARTERY WITHOUT ANGINA PECTORIS: ICD-10-CM

## 2023-09-22 DIAGNOSIS — Z90.49 ACQUIRED ABSENCE OF OTHER SPECIFIED PARTS OF DIGESTIVE TRACT: Chronic | ICD-10-CM

## 2023-09-22 DIAGNOSIS — Z95.810 PRESENCE OF AUTOMATIC (IMPLANTABLE) CARDIAC DEFIBRILLATOR: Chronic | ICD-10-CM

## 2023-09-22 PROCEDURE — 93018 CV STRESS TEST I&R ONLY: CPT | Mod: MH

## 2023-09-22 PROCEDURE — A9500: CPT

## 2023-09-22 PROCEDURE — 93016 CV STRESS TEST SUPVJ ONLY: CPT | Mod: MH

## 2023-09-22 PROCEDURE — 78452 HT MUSCLE IMAGE SPECT MULT: CPT | Mod: 26,MH

## 2023-09-22 PROCEDURE — 93306 TTE W/DOPPLER COMPLETE: CPT | Mod: 26

## 2023-09-22 PROCEDURE — C8929: CPT

## 2023-09-22 PROCEDURE — 78452 HT MUSCLE IMAGE SPECT MULT: CPT | Mod: MH

## 2023-09-25 ENCOUNTER — OUTPATIENT (OUTPATIENT)
Dept: OUTPATIENT SERVICES | Facility: HOSPITAL | Age: 73
LOS: 1 days | End: 2023-09-25

## 2023-09-25 VITALS
OXYGEN SATURATION: 97 % | DIASTOLIC BLOOD PRESSURE: 74 MMHG | WEIGHT: 162.04 LBS | RESPIRATION RATE: 15 BRPM | HEART RATE: 97 BPM | HEIGHT: 65 IN | TEMPERATURE: 97 F | SYSTOLIC BLOOD PRESSURE: 123 MMHG

## 2023-09-25 DIAGNOSIS — Z90.49 ACQUIRED ABSENCE OF OTHER SPECIFIED PARTS OF DIGESTIVE TRACT: Chronic | ICD-10-CM

## 2023-09-25 DIAGNOSIS — I50.9 HEART FAILURE, UNSPECIFIED: ICD-10-CM

## 2023-09-25 DIAGNOSIS — C78.6 SECONDARY MALIGNANT NEOPLASM OF RETROPERITONEUM AND PERITONEUM: ICD-10-CM

## 2023-09-25 DIAGNOSIS — Z95.810 PRESENCE OF AUTOMATIC (IMPLANTABLE) CARDIAC DEFIBRILLATOR: Chronic | ICD-10-CM

## 2023-09-25 DIAGNOSIS — I48.91 UNSPECIFIED ATRIAL FIBRILLATION: ICD-10-CM

## 2023-09-25 DIAGNOSIS — Z98.890 OTHER SPECIFIED POSTPROCEDURAL STATES: Chronic | ICD-10-CM

## 2023-09-25 DIAGNOSIS — Z95.5 PRESENCE OF CORONARY ANGIOPLASTY IMPLANT AND GRAFT: Chronic | ICD-10-CM

## 2023-09-25 DIAGNOSIS — Z95.810 PRESENCE OF AUTOMATIC (IMPLANTABLE) CARDIAC DEFIBRILLATOR: ICD-10-CM

## 2023-09-25 DIAGNOSIS — S82.90XA UNSPECIFIED FRACTURE OF UNSPECIFIED LOWER LEG, INITIAL ENCOUNTER FOR CLOSED FRACTURE: Chronic | ICD-10-CM

## 2023-09-25 LAB
A1C WITH ESTIMATED AVERAGE GLUCOSE RESULT: 5.9 % — HIGH (ref 4–5.6)
ANION GAP SERPL CALC-SCNC: 9 MMOL/L — SIGNIFICANT CHANGE UP (ref 7–14)
APTT BLD: 54 SEC — HIGH (ref 24.5–35.6)
BLD GP AB SCN SERPL QL: NEGATIVE — SIGNIFICANT CHANGE UP
BUN SERPL-MCNC: 18 MG/DL — SIGNIFICANT CHANGE UP (ref 7–23)
CALCIUM SERPL-MCNC: 8.9 MG/DL — SIGNIFICANT CHANGE UP (ref 8.4–10.5)
CHLORIDE SERPL-SCNC: 100 MMOL/L — SIGNIFICANT CHANGE UP (ref 98–107)
CO2 SERPL-SCNC: 27 MMOL/L — SIGNIFICANT CHANGE UP (ref 22–31)
CREAT SERPL-MCNC: 1.1 MG/DL — SIGNIFICANT CHANGE UP (ref 0.5–1.3)
EGFR: 70 ML/MIN/1.73M2 — SIGNIFICANT CHANGE UP
ESTIMATED AVERAGE GLUCOSE: 123 — SIGNIFICANT CHANGE UP
GLUCOSE SERPL-MCNC: 105 MG/DL — HIGH (ref 70–99)
HCT VFR BLD CALC: 43.8 % — SIGNIFICANT CHANGE UP (ref 39–50)
HGB BLD-MCNC: 14.3 G/DL — SIGNIFICANT CHANGE UP (ref 13–17)
INR BLD: 2.91 RATIO — HIGH (ref 0.85–1.18)
MCHC RBC-ENTMCNC: 29.2 PG — SIGNIFICANT CHANGE UP (ref 27–34)
MCHC RBC-ENTMCNC: 32.6 GM/DL — SIGNIFICANT CHANGE UP (ref 32–36)
MCV RBC AUTO: 89.6 FL — SIGNIFICANT CHANGE UP (ref 80–100)
NRBC # BLD: 0 /100 WBCS — SIGNIFICANT CHANGE UP (ref 0–0)
NRBC # FLD: 0 K/UL — SIGNIFICANT CHANGE UP (ref 0–0)
PLATELET # BLD AUTO: 213 K/UL — SIGNIFICANT CHANGE UP (ref 150–400)
POTASSIUM SERPL-MCNC: 4.4 MMOL/L — SIGNIFICANT CHANGE UP (ref 3.5–5.3)
POTASSIUM SERPL-SCNC: 4.4 MMOL/L — SIGNIFICANT CHANGE UP (ref 3.5–5.3)
PROTHROM AB SERPL-ACNC: 31.9 SEC — HIGH (ref 9.5–13)
RBC # BLD: 4.89 M/UL — SIGNIFICANT CHANGE UP (ref 4.2–5.8)
RBC # FLD: 13.4 % — SIGNIFICANT CHANGE UP (ref 10.3–14.5)
RH IG SCN BLD-IMP: POSITIVE — SIGNIFICANT CHANGE UP
SODIUM SERPL-SCNC: 136 MMOL/L — SIGNIFICANT CHANGE UP (ref 135–145)
WBC # BLD: 4.71 K/UL — SIGNIFICANT CHANGE UP (ref 3.8–10.5)
WBC # FLD AUTO: 4.71 K/UL — SIGNIFICANT CHANGE UP (ref 3.8–10.5)

## 2023-09-25 RX ORDER — PREGABALIN 225 MG/1
0 CAPSULE ORAL
Qty: 0 | Refills: 0 | DISCHARGE

## 2023-09-25 RX ORDER — CHOLECALCIFEROL (VITAMIN D3) 125 MCG
1 CAPSULE ORAL
Qty: 0 | Refills: 0 | DISCHARGE

## 2023-09-25 RX ORDER — CHLORHEXIDINE GLUCONATE 213 G/1000ML
1 SOLUTION TOPICAL DAILY
Refills: 0 | Status: DISCONTINUED | OUTPATIENT
Start: 2023-10-06 | End: 2023-10-09

## 2023-09-25 NOTE — H&P PST ADULT - PROBLEM SELECTOR PLAN 1
Patient tentatively scheduled for r exploratory , extensive tumor debulking HIPEC< possible bowel resection , possible ostomy       Pre-op instructions provided. Pt given verbal and written instructions with teach back on chlorhexidine shampoo and Pepcid. Pt verbalized understanding with return demonstration.

## 2023-09-25 NOTE — H&P PST ADULT - NSICDXPASTSURGICALHX_GEN_ALL_CORE_FT
PAST SURGICAL HISTORY:  AICD (automatic cardioverter/defibrillator) present battery changed 2014    H/O left inguinal hernia repair     History of appendectomy 2018    History of cholecystectomy 2014 complicated with septic shock    History of coronary artery stent placement in 2008 x 2 stents    Tibia/fibula fracture left 2004

## 2023-09-25 NOTE — H&P PST ADULT - PROBLEM SELECTOR PLAN 3
continue medications    Patient instructed to take Carvedilol and lisinopril  with a sip of water on the morning of procedure.    copy of ECHO, stress and EKG in the chart    requesting cardiac eval due hx CHF, Afib and AICD

## 2023-09-25 NOTE — H&P PST ADULT - ASSESSMENT
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 Patient is scheduled for exploratory , extensive tumor debulking HIPEC< possible bowel resection , possible ostomy

## 2023-09-25 NOTE — H&P PST ADULT - NSICDXPASTMEDICALHX_GEN_ALL_CORE_FT
PAST MEDICAL HISTORY:  AICD (automatic cardioverter/defibrillator) present     Arteriosclerotic heart disease (ASHD)     Atrial fibrillation     Benign prostatic hypertrophy     CAD (coronary artery disease)     CAD (coronary artery disease)     CHF (congestive heart failure) 2008 Tx with stent and ICD    Dyslipidemia     History of pancreatitis     History of secondary malignant neoplasm of retroperitoneum and peritoneum     HTN (hypertension)

## 2023-09-25 NOTE — H&P PST ADULT - NEGATIVE GENERAL GENITOURINARY SYMPTOMS
no hematuria/no renal colic/no flank pain L/no flank pain R/no urine discoloration/no gas in urine/no bladder infections/no incontinence/normal urinary frequency/no nocturia

## 2023-09-25 NOTE — H&P PST ADULT - GASTROINTESTINAL COMMENTS
preop dx of malignant neoplasm of retroperitoneum and Peritoneum malignant neoplasm of retroperitoneum and peritoneum

## 2023-09-25 NOTE — H&P PST ADULT - HISTORY OF PRESENT ILLNESS
patient is 73 is male presented to preop dx of malignant neoplasm of retroperitoneum . Patient is scheduled for exploratory , extensive tumor debulking HIPEC< possible bowel resection , possible ostomy

## 2023-09-25 NOTE — H&P PST ADULT - PROBLEM SELECTOR PLAN 2
Writer spoke with Dr. Carter from Nephrology making sure patient would be cleared by for discharge since creatinine has gone down and patient voided twice since farias removal. Dr. Carter said he might not see patient till tomorrow but to do a post void residual and if it is under 150 they should be good to go. Patient voided one hour ago but writer still did a bladder scan and the scan showed 161. Writer will do another bladder scan after next void.        patient instructed to obtain stop date from cardiologist    continue ASA hx of stents patient instructed to obtain stop on coumadin from cardiologist    continue ASA hx of stents

## 2023-09-25 NOTE — H&P PST ADULT - ATTENDING COMMENTS
Risks, benefits, and alternatives discussed with the patient - he expressed understanding and agrees to proceed with exploratory laparotomy, extensive tumor debulking, heated intraperitoneal chemotherapy, possible bowel resection, possible ostomy.

## 2023-09-26 ENCOUNTER — TRANSCRIPTION ENCOUNTER (OUTPATIENT)
Age: 73
End: 2023-09-26

## 2023-09-29 ENCOUNTER — NON-APPOINTMENT (OUTPATIENT)
Age: 73
End: 2023-09-29

## 2023-10-02 ENCOUNTER — APPOINTMENT (OUTPATIENT)
Dept: SURGICAL ONCOLOGY | Facility: CLINIC | Age: 73
End: 2023-10-02

## 2023-10-05 ENCOUNTER — TRANSCRIPTION ENCOUNTER (OUTPATIENT)
Age: 73
End: 2023-10-05

## 2023-10-05 PROBLEM — Z95.810 PRESENCE OF AUTOMATIC (IMPLANTABLE) CARDIAC DEFIBRILLATOR: Chronic | Status: ACTIVE | Noted: 2023-09-25

## 2023-10-05 PROBLEM — I25.10 ATHEROSCLEROTIC HEART DISEASE OF NATIVE CORONARY ARTERY WITHOUT ANGINA PECTORIS: Chronic | Status: ACTIVE | Noted: 2023-09-25

## 2023-10-05 PROBLEM — Z85.89 PERSONAL HISTORY OF MALIGNANT NEOPLASM OF OTHER ORGANS AND SYSTEMS: Chronic | Status: ACTIVE | Noted: 2023-09-25

## 2023-10-05 NOTE — ASU PATIENT PROFILE, ADULT - FALL HARM RISK - HARM RISK INTERVENTIONS

## 2023-10-05 NOTE — ASU PATIENT PROFILE, ADULT - NSTOBACCONEVERSMOKERY/N_GEN_A
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 Thu Coil on 2021. He was not felt to be a candidate for t-PA. MRI brain not able to be done due to patient's BMI. CT perfusion showed small region of ischemic penumbra in the distribution of the R HEMA. Mobile unit MRI showed findings consistent with acute infarct in the right middle cerebral artery. He was put on antiplatelet agents. Social:  Pt is in town from [facility] with wife, visiting and staying with his daughter and her family ( and 25year old son, all work). In [facility], pt lives with wife in a 2 floor plan with 1 ANITA + 3 steps without HR to main floor. Bedroom and bathroom on first floor. Laundry in basement with full flight to reach. Wife able to provide supervision as needed. Pt may return to daughter's home which is a split level with 1 ANITA + 4 steps with R HR to reach main floor where bedroom and bathroom are located. Prior to admission: Pt ambulated with R SPC, was functionally independent. Initial Evaluation  21 Discharge  3/12/21    Short Term Goals Long Term Goals    Was pt agreeable to Eval/treatment? yes yes     Does pt have pain? No c/o pain No c/o pain      Bed Mobility  Rolling: Cassi  Supine to sit: Cassi  Sit to supine: Cassi  Scooting: Cassi Rolling: Mod I   Supine to sit: Mod I   Sit to supine: Mod I  Scooting: Mod I    (twin bed) SBA Mod I   Transfers Sit to stand: Cassi  Stand to sit: Cassi  Stand pivot: Cassi with R LBQC Sit to stand:  Mod I   Stand to sit: Yes Mod I  Stand pivot with SBQC with Mod I    SBA Mod I   Ambulation    75 feet with R LBQC with Cassi (WC follow) 400 feet x1 with R SBQC with Mod I  150 feet with AAD with  feet with AAD with Mod I   Walking 10 feet on uneven surface TBA 10 feet with R SBQC with Mod I 10 feet with AAD with SBA 10 feet with AAD with Mod I   Wheel Chair Mobility NA NT     Car Transfers Cassi with R LBQC Mod I with R SBQC SBA Mod I   Stair negotiation: ascended and descended  4 steps with R rail with Cassi 12 steps without rails with Supervision (Reciprocal ascending and NR descending) 8 steps with 1 rail with SBA 12 steps with 1 rail with Mod I   Curb Step:   ascended and descended TBA 7.5 inch step with R SBQC and Supervision  7.5 inch step with AAD and SBA 7.5 inch step with AAD and Mod I   Picking up object off the floor TBA Picked up 4lb weight with Mod I  Will  2lb weight with SB assist Will  4lb weight with Mod I   BLE ROM WNL WNL     BLE Strength RLE grossly 5/5  L hip flexion 3+/5  L knee extension 4/5  L ankle DF/PF 4/5 RLE grossly 5/5  L hip flexion 4/5  L knee extension 5/5  L ankle DF/PF 4/5     Balance  Static and dynamic standing balance Cassi with R LBQC Static and dynamic standing balance supervision with R [facility]     Date Family Teach Completed  Wife present 3/5/21 and 3/12/21     Is additional Family Teaching Needed? Y or N Y N     Hindering Progress Hemiparesis Hemiparesis     PT recommended ELOS 3 weeks      Team's Discharge Plan       Therapist at Team Meeting         Comments: Patient exhibits improvements in physical function and balance and good progression towards goals. Patient progressed from utilization of [facility] to using [facility] with improved reciprocal gait pattern however required occasional cueing for LLE foot clearance. Patients wife was present for multiple family teach sessions and demonstrated safe guarding techniques during functional activities.  Patient received green dot on 3/11/21 per approval from PT and OT. Recommend use of R SBQC at all times and outpatient PT for aftercare.      Radha Harvey, SPT  Дмитрий Gonzalez, PT, DPT  [phone]

## 2023-10-06 ENCOUNTER — INPATIENT (INPATIENT)
Facility: HOSPITAL | Age: 73
LOS: 11 days | Discharge: HOME | End: 2023-10-18
Attending: SURGERY | Admitting: SURGERY
Payer: MEDICARE

## 2023-10-06 ENCOUNTER — APPOINTMENT (OUTPATIENT)
Dept: SURGICAL ONCOLOGY | Facility: HOSPITAL | Age: 73
End: 2023-10-06
Payer: MEDICARE

## 2023-10-06 ENCOUNTER — RESULT REVIEW (OUTPATIENT)
Age: 73
End: 2023-10-06

## 2023-10-06 VITALS
SYSTOLIC BLOOD PRESSURE: 114 MMHG | TEMPERATURE: 99 F | RESPIRATION RATE: 16 BRPM | DIASTOLIC BLOOD PRESSURE: 72 MMHG | HEART RATE: 86 BPM | OXYGEN SATURATION: 99 % | HEIGHT: 65 IN | WEIGHT: 162.04 LBS

## 2023-10-06 DIAGNOSIS — Z95.810 PRESENCE OF AUTOMATIC (IMPLANTABLE) CARDIAC DEFIBRILLATOR: Chronic | ICD-10-CM

## 2023-10-06 DIAGNOSIS — Z90.49 ACQUIRED ABSENCE OF OTHER SPECIFIED PARTS OF DIGESTIVE TRACT: Chronic | ICD-10-CM

## 2023-10-06 DIAGNOSIS — S82.90XA UNSPECIFIED FRACTURE OF UNSPECIFIED LOWER LEG, INITIAL ENCOUNTER FOR CLOSED FRACTURE: Chronic | ICD-10-CM

## 2023-10-06 DIAGNOSIS — C78.6 SECONDARY MALIGNANT NEOPLASM OF RETROPERITONEUM AND PERITONEUM: ICD-10-CM

## 2023-10-06 DIAGNOSIS — Z98.890 OTHER SPECIFIED POSTPROCEDURAL STATES: Chronic | ICD-10-CM

## 2023-10-06 DIAGNOSIS — Z95.5 PRESENCE OF CORONARY ANGIOPLASTY IMPLANT AND GRAFT: Chronic | ICD-10-CM

## 2023-10-06 LAB
ANION GAP SERPL CALC-SCNC: 11 MMOL/L — SIGNIFICANT CHANGE UP (ref 7–14)
ANION GAP SERPL CALC-SCNC: 11 MMOL/L — SIGNIFICANT CHANGE UP (ref 7–14)
APTT BLD: 32.1 SEC — SIGNIFICANT CHANGE UP (ref 24.5–35.6)
BLOOD GAS ARTERIAL COMPREHENSIVE RESULT: SIGNIFICANT CHANGE UP
BUN SERPL-MCNC: 11 MG/DL — SIGNIFICANT CHANGE UP (ref 7–23)
BUN SERPL-MCNC: 9 MG/DL — SIGNIFICANT CHANGE UP (ref 7–23)
CALCIUM SERPL-MCNC: 8.8 MG/DL — SIGNIFICANT CHANGE UP (ref 8.4–10.5)
CALCIUM SERPL-MCNC: 8.9 MG/DL — SIGNIFICANT CHANGE UP (ref 8.4–10.5)
CHLORIDE SERPL-SCNC: 106 MMOL/L — SIGNIFICANT CHANGE UP (ref 98–107)
CHLORIDE SERPL-SCNC: 106 MMOL/L — SIGNIFICANT CHANGE UP (ref 98–107)
CK MB BLD-MCNC: 5 % — HIGH (ref 0–2.5)
CK MB CFR SERPL CALC: 18.3 NG/ML — HIGH
CK SERPL-CCNC: 368 U/L — HIGH (ref 30–200)
CO2 SERPL-SCNC: 18 MMOL/L — LOW (ref 22–31)
CO2 SERPL-SCNC: 20 MMOL/L — LOW (ref 22–31)
CREAT SERPL-MCNC: 0.76 MG/DL — SIGNIFICANT CHANGE UP (ref 0.5–1.3)
CREAT SERPL-MCNC: 0.87 MG/DL — SIGNIFICANT CHANGE UP (ref 0.5–1.3)
EGFR: 91 ML/MIN/1.73M2 — SIGNIFICANT CHANGE UP
EGFR: 94 ML/MIN/1.73M2 — SIGNIFICANT CHANGE UP
GAS PNL BLDA: SIGNIFICANT CHANGE UP
GAS PNL BLDA: SIGNIFICANT CHANGE UP
GLUCOSE BLDC GLUCOMTR-MCNC: 100 MG/DL — HIGH (ref 70–99)
GLUCOSE SERPL-MCNC: 149 MG/DL — HIGH (ref 70–99)
GLUCOSE SERPL-MCNC: 171 MG/DL — HIGH (ref 70–99)
HCT VFR BLD CALC: 35.7 % — LOW (ref 39–50)
HGB BLD-MCNC: 11.9 G/DL — LOW (ref 13–17)
INR BLD: 1.28 RATIO — HIGH (ref 0.85–1.18)
MAGNESIUM SERPL-MCNC: 1.8 MG/DL — SIGNIFICANT CHANGE UP (ref 1.6–2.6)
MAGNESIUM SERPL-MCNC: 1.8 MG/DL — SIGNIFICANT CHANGE UP (ref 1.6–2.6)
MAGNESIUM SERPL-MCNC: 1.9 MG/DL — SIGNIFICANT CHANGE UP (ref 1.6–2.6)
MCHC RBC-ENTMCNC: 29.4 PG — SIGNIFICANT CHANGE UP (ref 27–34)
MCHC RBC-ENTMCNC: 33.3 GM/DL — SIGNIFICANT CHANGE UP (ref 32–36)
MCV RBC AUTO: 88.1 FL — SIGNIFICANT CHANGE UP (ref 80–100)
NRBC # BLD: 0 /100 WBCS — SIGNIFICANT CHANGE UP (ref 0–0)
NRBC # FLD: 0 K/UL — SIGNIFICANT CHANGE UP (ref 0–0)
PHOSPHATE SERPL-MCNC: 2.9 MG/DL — SIGNIFICANT CHANGE UP (ref 2.5–4.5)
PHOSPHATE SERPL-MCNC: 3.8 MG/DL — SIGNIFICANT CHANGE UP (ref 2.5–4.5)
PLATELET # BLD AUTO: 186 K/UL — SIGNIFICANT CHANGE UP (ref 150–400)
POTASSIUM SERPL-MCNC: 4.4 MMOL/L — SIGNIFICANT CHANGE UP (ref 3.5–5.3)
POTASSIUM SERPL-MCNC: 4.4 MMOL/L — SIGNIFICANT CHANGE UP (ref 3.5–5.3)
POTASSIUM SERPL-SCNC: 4.4 MMOL/L — SIGNIFICANT CHANGE UP (ref 3.5–5.3)
POTASSIUM SERPL-SCNC: 4.4 MMOL/L — SIGNIFICANT CHANGE UP (ref 3.5–5.3)
PROTHROM AB SERPL-ACNC: 14.2 SEC — HIGH (ref 9.5–13)
RBC # BLD: 4.05 M/UL — LOW (ref 4.2–5.8)
RBC # FLD: 13.2 % — SIGNIFICANT CHANGE UP (ref 10.3–14.5)
RH IG SCN BLD-IMP: POSITIVE — SIGNIFICANT CHANGE UP
SODIUM SERPL-SCNC: 135 MMOL/L — SIGNIFICANT CHANGE UP (ref 135–145)
SODIUM SERPL-SCNC: 137 MMOL/L — SIGNIFICANT CHANGE UP (ref 135–145)
TROPONIN T, HIGH SENSITIVITY RESULT: 218 NG/L — CRITICAL HIGH
WBC # BLD: 10.12 K/UL — SIGNIFICANT CHANGE UP (ref 3.8–10.5)
WBC # FLD AUTO: 10.12 K/UL — SIGNIFICANT CHANGE UP (ref 3.8–10.5)

## 2023-10-06 PROCEDURE — XXXXX: CPT | Mod: 1L

## 2023-10-06 PROCEDURE — 49203: CPT | Mod: 80

## 2023-10-06 PROCEDURE — 71045 X-RAY EXAM CHEST 1 VIEW: CPT | Mod: 26

## 2023-10-06 PROCEDURE — 93010 ELECTROCARDIOGRAM REPORT: CPT

## 2023-10-06 PROCEDURE — 88305 TISSUE EXAM BY PATHOLOGIST: CPT | Mod: 26

## 2023-10-06 PROCEDURE — 49203: CPT

## 2023-10-06 PROCEDURE — 99222 1ST HOSP IP/OBS MODERATE 55: CPT

## 2023-10-06 DEVICE — LIGATING CLIPS WECK HORIZON MEDIUM (BLUE) 24: Type: IMPLANTABLE DEVICE | Status: FUNCTIONAL

## 2023-10-06 DEVICE — SURGICEL 2 X 14": Type: IMPLANTABLE DEVICE | Status: FUNCTIONAL

## 2023-10-06 DEVICE — LIGATING CLIPS WECK HORIZON LARGE (ORANGE) 24: Type: IMPLANTABLE DEVICE | Status: FUNCTIONAL

## 2023-10-06 RX ORDER — TETRACAINE/BENZOCAINE/BUTAMBEN 2%-14%-2%
1 OINTMENT (GRAM) TOPICAL DAILY
Refills: 0 | Status: DISCONTINUED | OUTPATIENT
Start: 2023-10-06 | End: 2023-10-08

## 2023-10-06 RX ORDER — CHLORHEXIDINE GLUCONATE 213 G/1000ML
1 SOLUTION TOPICAL
Refills: 0 | Status: DISCONTINUED | OUTPATIENT
Start: 2023-10-06 | End: 2023-10-08

## 2023-10-06 RX ORDER — CHOLECALCIFEROL (VITAMIN D3) 125 MCG
0 CAPSULE ORAL
Refills: 0 | DISCHARGE

## 2023-10-06 RX ORDER — HYDROMORPHONE HYDROCHLORIDE 2 MG/ML
0.5 INJECTION INTRAMUSCULAR; INTRAVENOUS; SUBCUTANEOUS
Refills: 0 | Status: DISCONTINUED | OUTPATIENT
Start: 2023-10-06 | End: 2023-10-07

## 2023-10-06 RX ORDER — HYDROMORPHONE HYDROCHLORIDE 2 MG/ML
1 INJECTION INTRAMUSCULAR; INTRAVENOUS; SUBCUTANEOUS
Refills: 0 | Status: DISCONTINUED | OUTPATIENT
Start: 2023-10-06 | End: 2023-10-07

## 2023-10-06 RX ORDER — MAGNESIUM SULFATE 500 MG/ML
2 VIAL (ML) INJECTION ONCE
Refills: 0 | Status: COMPLETED | OUTPATIENT
Start: 2023-10-06 | End: 2023-10-06

## 2023-10-06 RX ORDER — HYDROMORPHONE HYDROCHLORIDE 2 MG/ML
0.5 INJECTION INTRAMUSCULAR; INTRAVENOUS; SUBCUTANEOUS
Refills: 0 | Status: DISCONTINUED | OUTPATIENT
Start: 2023-10-06 | End: 2023-10-11

## 2023-10-06 RX ORDER — ACETAMINOPHEN 500 MG
1000 TABLET ORAL EVERY 6 HOURS
Refills: 0 | Status: COMPLETED | OUTPATIENT
Start: 2023-10-06 | End: 2023-10-07

## 2023-10-06 RX ORDER — MITOMYCIN 5 MG/10ML
10 INJECTION, POWDER, LYOPHILIZED, FOR SOLUTION INTRAVENOUS ONCE
Refills: 0 | Status: DISCONTINUED | OUTPATIENT
Start: 2023-10-06 | End: 2023-10-06

## 2023-10-06 RX ORDER — WARFARIN SODIUM 2.5 MG/1
1 TABLET ORAL
Refills: 0 | DISCHARGE

## 2023-10-06 RX ORDER — SODIUM CHLORIDE 9 MG/ML
1000 INJECTION INTRAMUSCULAR; INTRAVENOUS; SUBCUTANEOUS
Refills: 0 | Status: DISCONTINUED | OUTPATIENT
Start: 2023-10-06 | End: 2023-10-09

## 2023-10-06 RX ORDER — MITOMYCIN 5 MG/10ML
30 INJECTION, POWDER, LYOPHILIZED, FOR SOLUTION INTRAVENOUS ONCE
Refills: 0 | Status: DISCONTINUED | OUTPATIENT
Start: 2023-10-06 | End: 2023-10-06

## 2023-10-06 RX ORDER — SODIUM CHLORIDE 9 MG/ML
1000 INJECTION, SOLUTION INTRAVENOUS
Refills: 0 | Status: DISCONTINUED | OUTPATIENT
Start: 2023-10-06 | End: 2023-10-06

## 2023-10-06 RX ORDER — SODIUM CHLORIDE 9 MG/ML
10 INJECTION INTRAMUSCULAR; INTRAVENOUS; SUBCUTANEOUS
Refills: 0 | Status: DISCONTINUED | OUTPATIENT
Start: 2023-10-06 | End: 2023-10-18

## 2023-10-06 RX ORDER — NALOXONE HYDROCHLORIDE 4 MG/.1ML
0.1 SPRAY NASAL
Refills: 0 | Status: DISCONTINUED | OUTPATIENT
Start: 2023-10-06 | End: 2023-10-18

## 2023-10-06 RX ORDER — HYDROMORPHONE HYDROCHLORIDE 2 MG/ML
30 INJECTION INTRAMUSCULAR; INTRAVENOUS; SUBCUTANEOUS
Refills: 0 | Status: DISCONTINUED | OUTPATIENT
Start: 2023-10-06 | End: 2023-10-09

## 2023-10-06 RX ORDER — ONDANSETRON 8 MG/1
4 TABLET, FILM COATED ORAL EVERY 6 HOURS
Refills: 0 | Status: DISCONTINUED | OUTPATIENT
Start: 2023-10-06 | End: 2023-10-16

## 2023-10-06 RX ADMIN — SODIUM CHLORIDE 75 MILLILITER(S): 9 INJECTION INTRAMUSCULAR; INTRAVENOUS; SUBCUTANEOUS at 13:55

## 2023-10-06 RX ADMIN — HYDROMORPHONE HYDROCHLORIDE 30 MILLILITER(S): 2 INJECTION INTRAMUSCULAR; INTRAVENOUS; SUBCUTANEOUS at 20:13

## 2023-10-06 RX ADMIN — Medication 400 MILLIGRAM(S): at 21:30

## 2023-10-06 RX ADMIN — SODIUM CHLORIDE 30 MILLILITER(S): 9 INJECTION, SOLUTION INTRAVENOUS at 07:12

## 2023-10-06 RX ADMIN — CHLORHEXIDINE GLUCONATE 1 APPLICATION(S): 213 SOLUTION TOPICAL at 07:13

## 2023-10-06 RX ADMIN — HYDROMORPHONE HYDROCHLORIDE 30 MILLILITER(S): 2 INJECTION INTRAMUSCULAR; INTRAVENOUS; SUBCUTANEOUS at 12:37

## 2023-10-06 RX ADMIN — Medication 1 SPRAY(S): at 17:59

## 2023-10-06 RX ADMIN — SODIUM CHLORIDE 30 MILLILITER(S): 9 INJECTION, SOLUTION INTRAVENOUS at 12:37

## 2023-10-06 RX ADMIN — Medication 1000 MILLIGRAM(S): at 22:00

## 2023-10-06 RX ADMIN — Medication 25 GRAM(S): at 20:36

## 2023-10-06 NOTE — CONSULT NOTE ADULT - SUBJECTIVE AND OBJECTIVE BOX
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 Patient is scheduled for exploratory , extensive tumor debulking HIPEC< possible bowel resection , possible ostomy  (25 Sep 2023 09:38)    PAST MEDICAL & SURGICAL HISTORY:  Dyslipidemia      Benign prostatic hypertrophy      CAD (coronary artery disease)      Atrial fibrillation      CHF (congestive heart failure)  2008 Tx with stent and ICD      HTN (hypertension)      Arteriosclerotic heart disease (ASHD)      History of pancreatitis      CAD (coronary artery disease)      History of secondary malignant neoplasm of retroperitoneum and peritoneum      AICD (automatic cardioverter/defibrillator) present      AICD (automatic cardioverter/defibrillator) present  battery changed 2014      History of coronary artery stent placement  in 2008 x 2 stents      Tibia/fibula fracture  left 2004      History of cholecystectomy  2014 complicated with septic shock      History of appendectomy  2018      H/O left inguinal hernia repair        Home Meds: Home Medications:  aspirin 81 mg oral tablet: 1 tab(s) orally once a day (06 Oct 2023 06:40)  iron supplements: take 1 tab orally once a day (06 Oct 2023 06:40)  multivitamin: take 1 tab orally once a day (06 Oct 2023 06:40)  vitamin D3: take 1 cap orally once a day (06 Oct 2023 06:40)  warfarin 5 mg oral tablet: 1 tab(s) orally once a day (06 Oct 2023 06:40)    Allergies: Allergies    No Known Allergies    Intolerances      Soc:   Advanced Directives: Presumed Full Code     ROS:    REVIEW OF SYSTEMS    CURRENT MEDICATIONS:   --------------------------------------------------------------------------------------  Neurologic Medications  HYDROmorphone  Injectable 0.5 milliGRAM(s) IV Push every 10 minutes PRN Moderate Pain (4 - 6)  HYDROmorphone  Injectable 1 milliGRAM(s) IV Push every 10 minutes PRN Severe Pain (7 - 10)  HYDROmorphone PCA (1 mG/mL) 30 milliLiter(s) PCA Continuous PCA Continuous  HYDROmorphone PCA (1 mG/mL) Rescue Clinician Bolus 0.5 milliGRAM(s) IV Push every 15 minutes PRN for Pain Scale GREATER THAN 6  ondansetron Injectable 4 milliGRAM(s) IV Push every 6 hours PRN Nausea    Respiratory Medications    Cardiovascular Medications    Gastrointestinal Medications  lactated ringers. 1000 milliLiter(s) IV Continuous <Continuous>  sodium chloride 0.9% lock flush 10 milliLiter(s) IV Push every 1 hour PRN Pre/post blood products, medications, blood draw, and to maintain line patency  sodium chloride 0.9%. 1000 milliLiter(s) IV Continuous <Continuous>    Genitourinary Medications    Hematologic/Oncologic Medications  mitoMYcin INTRAPERITONEAL  (eMAR) 10 milliGRAM(s) IntraPeritoneal once  mitoMYcin INTRAPERITONEAL  (eMAR) 30 milliGRAM(s) IntraPeritoneal once    Antimicrobial/Immunologic Medications    Endocrine/Metabolic Medications    Topical/Other Medications  chlorhexidine 2% Cloths 1 Application(s) Topical daily  chlorhexidine 4% Liquid 1 Application(s) Topical <User Schedule>  naloxone Injectable 0.1 milliGRAM(s) IV Push every 3 minutes PRN For ANY of the following changes in patient status:  A. RR LESS THAN 10 breaths per minute, B. Oxygen saturation LESS THAN 90%, C. Sedation score of 6    --------------------------------------------------------------------------------------    VITAL SIGNS, INS/OUTS (last 24 hours):  --------------------------------------------------------------------------------------  ICU Vital Signs Last 24 Hrs  T(C): 36.1 (06 Oct 2023 14:00), Max: 37.1 (06 Oct 2023 06:00)  T(F): 97 (06 Oct 2023 14:00), Max: 98.7 (06 Oct 2023 06:00)  HR: 99 (06 Oct 2023 13:45) (86 - 99)  BP: 96/67 (06 Oct 2023 13:45) (96/67 - 125/78)  BP(mean): 74 (06 Oct 2023 13:45) (74 - 88)  ABP: 94/51 (06 Oct 2023 13:45) (94/51 - 116/65)  ABP(mean): 68 (06 Oct 2023 13:45) (68 - 86)  RR: 10 (06 Oct 2023 13:45) (10 - 16)  SpO2: 96% (06 Oct 2023 13:45) (96% - 100%)    O2 Parameters below as of 06 Oct 2023 13:15  Patient On (Oxygen Delivery Method): room air          I&O's Summary    06 Oct 2023 07:01  -  06 Oct 2023 14:12  --------------------------------------------------------  IN: 105 mL / OUT: 160 mL / NET: -55 mL      --------------------------------------------------------------------------------------    EXAM:  General/Neuro  Exam: Normal, NAD, alert, oriented x 3, no focal deficits. PERRLA      Respiratory  Exam: Lungs clear to auscultation, Normal expansion/effort.      Cardiovascular  Exam: S1, S2.  Regular rate and rhythm.  Peripheral edema      GI  Exam: Abdomen soft, Non-tender, Non-distended.  Gastrostomy / Jejunostomy tube in place.  Nasogastric tube in place.    Current Diet:  NPO    Tubes/Lines/Drains  [x] Peripheral IV  [] Central Venous Line     	[] R	[] L	[] IJ	[] Fem	[] SC        Type:	    Date Placed:   [] Arterial Line		[] R	[] L	[] Fem	[] Rad	[] Ax	Date Placed:   [] PICC:         	[] Midline		[] Mediport           [] Urinary Catheter		Date Placed:     Extremities  Exam: Extremities warm, pink, well-perfused.        --------------------------------------------------------------------------------------  Labs:  CAPILLARY BLOOD GLUCOSE      POCT Blood Glucose.: 100 mg/dL (06 Oct 2023 06:42)                          11.9   10.12 )-----------( 186      ( 06 Oct 2023 12:48 )             35.7         10-06    135  |  106  |  9   ----------------------------<  171<H>  4.4   |  18<L>  |  0.76      Calcium: 8.9 mg/dL (10-06-23 @ 12:48)      LFTs:     Blood Gas Arterial, Lactate: 0.7 mmol/L (10-06-23 @ 12:48)  Blood Gas Arterial, Lactate: 1.3 mmol/L (10-06-23 @ 10:39)  Blood Gas Arterial, Lactate: 1.3 mmol/L (10-06-23 @ 08:13)    ABG - ( 06 Oct 2023 12:48 )  pH: 7.35  /  pCO2: 38    /  pO2: 183   / HCO3: 21    / Base Excess: -4.2  /  SaO2: 99.0            ABG - ( 06 Oct 2023 10:39 )  pH: 7.45  /  pCO2: 34    /  pO2: 259   / HCO3: 24    / Base Excess: 0.0   /  SaO2: 99.1            ABG - ( 06 Oct 2023 08:13 )  pH: 7.44  /  pCO2: 35    /  pO2: 379   / HCO3: 24    / Base Excess: 0.0   /  SaO2: 99.1              Coags:     14.2   ----< 1.28    ( 06 Oct 2023 06:11 )     32.1                Urinalysis Basic - ( 06 Oct 2023 12:48 )    Color: x / Appearance: x / SG: x / pH: x  Gluc: 171 mg/dL / Ketone: x  / Bili: x / Urobili: x   Blood: x / Protein: x / Nitrite: x   Leuk Esterase: x / RBC: x / WBC x   Sq Epi: x / Non Sq Epi: x / Bacteria: x          --------------------------------------------------------------------------------------

## 2023-10-06 NOTE — CONSULT NOTE ADULT - ASSESSMENT
ASSESSMENT:  73y Male with PMHx of  malignant neoplasm of retroperitoneum now s/p exploratory laparotomy with omentectomy.     PLAN:   Neurologic:   - AOx4  - Pain is controlled with PCA pump and Tylenol    Respiratory:  - Saturating well on 2L NC    Cardiovascular:   - Hemodynamically stable, OFF pressors    Gastrointestinal/Nutrition:   - NPO     Renal/Genitourinary:   - Monitor UOP  - Trend electrolytes and replete accordingly    Hematologic:   - H&H  - Chemical DVT PPx  - Mechanical DVT PPx    Infectious Disease:   - Trend WBC and active signs of infection    Lines/Tubes:    Endocrine:   - Monitor blood glucose levels     Disposition:   SICU  FULL CODE    Critical Care Diagnoses:

## 2023-10-06 NOTE — PACU DISCHARGE NOTE - COMMENTS
Intraoperatively, EKG reported to reveal bigeminy. Patient also hypotensive requiring levophed infusion intraoperatively. Levophed stopped prior to leaving OR. Patient has not required pressors in PACU and has not had further events/complications in PACU. To transfer to SICU for next level of care.

## 2023-10-06 NOTE — BRIEF OPERATIVE NOTE - NSICDXBRIEFPOSTOP_GEN_ALL_CORE_FT
POST-OP DIAGNOSIS:  Secondary malignant neoplasm retroperitoneum 09-Oct-2023 18:46:05  Jyoti Samson

## 2023-10-06 NOTE — CHART NOTE - NSCHARTNOTEFT_GEN_A_CORE
Post Operative Note  Patient: JONNIE ARMSTRONG 73y (1950) Male   MRN: [id number]  Location: [place] [id number]  Visit: 10-06-23 Inpatient  Date: 10-06-23 @ 19:12 (Patient seen at 16:45)    Procedure: S/P ex lap w/ omentectomy and repair of serosa     Subjective: Patient seen and examined post operatively. Reports pain as controlled, other than soreness of his throat. Denies nausea, vomiting, fever, chills, chest pain, SOB.       Objective:  Vitals: T(F): 97 (10-06-23 @ 18:00), Max: 98.7 (10-06-23 @ 06:00)  HR: 102 (10-06-23 @ 18:00)  BP: 112/74 (10-06-23 @ 18:00) (96/67 - 125/78)  RR: 18 (10-06-23 @ 18:00)  SpO2: 97% (10-06-23 @ 18:00)  Vent Settings:     In:   10-06-23 @ 07:01  -  10-06-23 @ 19:12  --------------------------------------------------------  IN: 330 mL      IV Fluids: sodium chloride 0.9%. 1000 milliLiter(s) (75 mL/Hr) IV Continuous <Continuous>      Out:   10-06-23 @ 07:01  -  10-06-23 @ 19:12  --------------------------------------------------------  OUT: 300 mL      EBL:     Voided Urine:   10-06-23 @ 07:01  -  10-06-23 @ 19:12  --------------------------------------------------------  OUT: 300 mL      Borjas Catheter: yes no   Drains:   DAYA:    ,   Chest Tube:      NG Tube:   10-06-23 @ 07:01  -  10-06-23 @ 19:12  --------------------------------------------------------  OUT: 0 mL          Physical Examination:  General: NAD, resting comfortably in bed, AOx3  HEENT: Normocephalic atraumatic  Respiratory: Nonlabored respirations, normal CW expansion, on RA  Cardio: S1S2, regular rate and rhythm.  Abdomen: softly distended, appropriately tender, surgical incisions are c/d/i, NGT in place w/ minimal output  Vascular: extremities are warm and well perfused.     Imaging:  No post-op imaging studies    Assessment:  73y male patient S/P ex lap w/ omentectomy and repair of serosa for secondary malignant neoplasm of the retroperitoneum. Planned HIPEC and further debulking abandoned due to patient cardiac arrhythmias and need for pressors in OR. Patient confirms that him and his family are aware of the event. He has no immediate complaints other than a sore throat. He is denying any acute chest pain or SOB and his vital signs are appropriate on RA and off pressors at the time of post operative check. CBC and BMP performed after case was discontinued demonstrated no metabolic derangements.     Plan:  - Pain control PCA  - Cetacaine available for sore throat  - Midline prevena to be removed on POD5  - Diet: NPO  - IVF  - Borjas  - DVT ppx: SCD's  - Appreciate remainder of care per SICU    Date/Time: 10-06-23 @ 19:12

## 2023-10-06 NOTE — PROVIDER CONTACT NOTE (CRITICAL VALUE NOTIFICATION) - BACKGROUND
pt s/p Ex Lap with omentectomy and repair of serosa. pt with extensive cardiac history. pt having PVC's and beats of vtach on cardiac monitor.

## 2023-10-06 NOTE — CONSULT NOTE ADULT - ATTENDING COMMENTS
I agree with the detailed interval history, physical, and plan, which I have reviewed and edited where appropriate'; also agree with notes/assessment with my team on service.  I have personally examined the patient.  I was physically present for the key portions of the evaluation and management (E/M) service provided.  I reviewed all the pertinent data.  The patient is a critical care patient with life threatening hemodynamic and metabolic instability in SICU.  The SICU team has a constant risk benefit analyzes discussion and coordinating care with the primary team and all consultants.   The patient is in SICU with the chief complaint and diagnosis mentioned in the note.   The plan will be specified in the note.  73y Male s/p exploratory laparotomy with omentectomy. SICU consult for HD monitoring.  EXAM:  General/Neuro  Exam: no focal deficits  Respiratory  Exam: Lungs clear   Cardiovascular  Exam: Regular rate   GI  Exam: Abdomen soft  PLAN:   Neurologic:   - PCA pump and Tylenol  Respiratory:  - 2L NC  Cardiovascular:   - MAP>65  Gastrointestinal/Nutrition:   - NPO   Renal/Genitourinary:   - Trend electrolytes   Hematologic:   -SQH  Infectious Disease:   - Trend WBC   Endocrine:   - Monitor blood glucose   Disposition:   SICU  FULL CODE

## 2023-10-07 LAB
ANION GAP SERPL CALC-SCNC: 13 MMOL/L — SIGNIFICANT CHANGE UP (ref 7–14)
BUN SERPL-MCNC: 12 MG/DL — SIGNIFICANT CHANGE UP (ref 7–23)
CALCIUM SERPL-MCNC: 8.4 MG/DL — SIGNIFICANT CHANGE UP (ref 8.4–10.5)
CHLORIDE SERPL-SCNC: 106 MMOL/L — SIGNIFICANT CHANGE UP (ref 98–107)
CK MB BLD-MCNC: 4.4 % — HIGH (ref 0–2.5)
CK MB CFR SERPL CALC: 19.4 NG/ML — HIGH
CK SERPL-CCNC: 443 U/L — HIGH (ref 30–200)
CO2 SERPL-SCNC: 18 MMOL/L — LOW (ref 22–31)
CREAT SERPL-MCNC: 0.95 MG/DL — SIGNIFICANT CHANGE UP (ref 0.5–1.3)
EGFR: 84 ML/MIN/1.73M2 — SIGNIFICANT CHANGE UP
GLUCOSE SERPL-MCNC: 157 MG/DL — HIGH (ref 70–99)
HCT VFR BLD CALC: 35.4 % — LOW (ref 39–50)
HGB BLD-MCNC: 11.5 G/DL — LOW (ref 13–17)
MAGNESIUM SERPL-MCNC: 2.3 MG/DL — SIGNIFICANT CHANGE UP (ref 1.6–2.6)
MCHC RBC-ENTMCNC: 29 PG — SIGNIFICANT CHANGE UP (ref 27–34)
MCHC RBC-ENTMCNC: 32.5 GM/DL — SIGNIFICANT CHANGE UP (ref 32–36)
MCV RBC AUTO: 89.2 FL — SIGNIFICANT CHANGE UP (ref 80–100)
NRBC # BLD: 0 /100 WBCS — SIGNIFICANT CHANGE UP (ref 0–0)
NRBC # FLD: 0 K/UL — SIGNIFICANT CHANGE UP (ref 0–0)
PHOSPHATE SERPL-MCNC: 4 MG/DL — SIGNIFICANT CHANGE UP (ref 2.5–4.5)
PLATELET # BLD AUTO: 196 K/UL — SIGNIFICANT CHANGE UP (ref 150–400)
POTASSIUM SERPL-MCNC: 4.6 MMOL/L — SIGNIFICANT CHANGE UP (ref 3.5–5.3)
POTASSIUM SERPL-SCNC: 4.6 MMOL/L — SIGNIFICANT CHANGE UP (ref 3.5–5.3)
RBC # BLD: 3.97 M/UL — LOW (ref 4.2–5.8)
RBC # FLD: 13.7 % — SIGNIFICANT CHANGE UP (ref 10.3–14.5)
SODIUM SERPL-SCNC: 137 MMOL/L — SIGNIFICANT CHANGE UP (ref 135–145)
TROPONIN T, HIGH SENSITIVITY RESULT: 166 NG/L — CRITICAL HIGH
TROPONIN T, HIGH SENSITIVITY RESULT: 199 NG/L — CRITICAL HIGH
TROPONIN T, HIGH SENSITIVITY RESULT: 235 NG/L — CRITICAL HIGH
TROPONIN T, HIGH SENSITIVITY RESULT: 266 NG/L — CRITICAL HIGH
WBC # BLD: 10.85 K/UL — HIGH (ref 3.8–10.5)
WBC # FLD AUTO: 10.85 K/UL — HIGH (ref 3.8–10.5)

## 2023-10-07 PROCEDURE — 99291 CRITICAL CARE FIRST HOUR: CPT

## 2023-10-07 PROCEDURE — 93010 ELECTROCARDIOGRAM REPORT: CPT

## 2023-10-07 PROCEDURE — 99222 1ST HOSP IP/OBS MODERATE 55: CPT | Mod: GC

## 2023-10-07 RX ORDER — METOPROLOL TARTRATE 50 MG
5 TABLET ORAL EVERY 6 HOURS
Refills: 0 | Status: DISCONTINUED | OUTPATIENT
Start: 2023-10-07 | End: 2023-10-09

## 2023-10-07 RX ORDER — INFLUENZA VIRUS VACCINE 15; 15; 15; 15 UG/.5ML; UG/.5ML; UG/.5ML; UG/.5ML
0.7 SUSPENSION INTRAMUSCULAR ONCE
Refills: 0 | Status: DISCONTINUED | OUTPATIENT
Start: 2023-10-07 | End: 2023-10-18

## 2023-10-07 RX ORDER — ACETAMINOPHEN 500 MG
1000 TABLET ORAL EVERY 6 HOURS
Refills: 0 | Status: COMPLETED | OUTPATIENT
Start: 2023-10-08 | End: 2023-10-08

## 2023-10-07 RX ORDER — SODIUM CHLORIDE 9 MG/ML
500 INJECTION, SOLUTION INTRAVENOUS ONCE
Refills: 0 | Status: COMPLETED | OUTPATIENT
Start: 2023-10-07 | End: 2023-10-07

## 2023-10-07 RX ORDER — BENZOCAINE AND MENTHOL 5; 1 G/100ML; G/100ML
1 LIQUID ORAL EVERY 6 HOURS
Refills: 0 | Status: DISCONTINUED | OUTPATIENT
Start: 2023-10-07 | End: 2023-10-08

## 2023-10-07 RX ORDER — ENOXAPARIN SODIUM 100 MG/ML
40 INJECTION SUBCUTANEOUS EVERY 24 HOURS
Refills: 0 | Status: DISCONTINUED | OUTPATIENT
Start: 2023-10-07 | End: 2023-10-18

## 2023-10-07 RX ADMIN — Medication 5 MILLIGRAM(S): at 10:33

## 2023-10-07 RX ADMIN — SODIUM CHLORIDE 250 MILLILITER(S): 9 INJECTION, SOLUTION INTRAVENOUS at 00:35

## 2023-10-07 RX ADMIN — HYDROMORPHONE HYDROCHLORIDE 30 MILLILITER(S): 2 INJECTION INTRAMUSCULAR; INTRAVENOUS; SUBCUTANEOUS at 18:59

## 2023-10-07 RX ADMIN — Medication 1 SPRAY(S): at 11:32

## 2023-10-07 RX ADMIN — BENZOCAINE AND MENTHOL 1 LOZENGE: 5; 1 LIQUID ORAL at 07:29

## 2023-10-07 RX ADMIN — Medication 1000 MILLIGRAM(S): at 04:45

## 2023-10-07 RX ADMIN — SODIUM CHLORIDE 75 MILLILITER(S): 9 INJECTION INTRAMUSCULAR; INTRAVENOUS; SUBCUTANEOUS at 14:40

## 2023-10-07 RX ADMIN — Medication 5 MILLIGRAM(S): at 17:40

## 2023-10-07 RX ADMIN — Medication 1000 MILLIGRAM(S): at 11:00

## 2023-10-07 RX ADMIN — Medication 400 MILLIGRAM(S): at 10:33

## 2023-10-07 RX ADMIN — ENOXAPARIN SODIUM 40 MILLIGRAM(S): 100 INJECTION SUBCUTANEOUS at 16:58

## 2023-10-07 RX ADMIN — SODIUM CHLORIDE 75 MILLILITER(S): 9 INJECTION INTRAMUSCULAR; INTRAVENOUS; SUBCUTANEOUS at 19:55

## 2023-10-07 RX ADMIN — Medication 400 MILLIGRAM(S): at 16:57

## 2023-10-07 RX ADMIN — HYDROMORPHONE HYDROCHLORIDE 30 MILLILITER(S): 2 INJECTION INTRAMUSCULAR; INTRAVENOUS; SUBCUTANEOUS at 14:32

## 2023-10-07 RX ADMIN — Medication 400 MILLIGRAM(S): at 04:13

## 2023-10-07 RX ADMIN — CHLORHEXIDINE GLUCONATE 1 APPLICATION(S): 213 SOLUTION TOPICAL at 06:30

## 2023-10-07 NOTE — PROGRESS NOTE ADULT - SUBJECTIVE AND OBJECTIVE BOX
Anesthesia Pain Management Service    SUBJECTIVE: Patient is doing well with IV PCA . Reports sore throat.    Pain Scale Score	At rest: _3/10__ 	With Activity: ___ 	[X ] Refer to charted pain scores    THERAPY:    [ ] IV PCA Morphine		[ ] 5 mg/mL	[ ] 1 mg/mL  [X ] IV PCA Hydromorphone	[ ] 5 mg/mL	[X ] 1 mg/mL  [ ] IV PCA Fentanyl		[ ] 50 micrograms/mL    Demand dose __0.2_ lockout __6_ (minutes) Continuous Rate _0__ Total: 3.4___  mg used (in past 24 hours)      MEDICATIONS  (STANDING):  acetaminophen   IVPB .. 1000 milliGRAM(s) IV Intermittent every 6 hours  chlorhexidine 2% Cloths 1 Application(s) Topical daily  chlorhexidine 4% Liquid 1 Application(s) Topical <User Schedule>  HYDROmorphone PCA (1 mG/mL) 30 milliLiter(s) PCA Continuous PCA Continuous  metoprolol tartrate Injectable 5 milliGRAM(s) IV Push every 6 hours  sodium chloride 0.9%. 1000 milliLiter(s) (75 mL/Hr) IV Continuous <Continuous>  tetracaine/benzocaine/butamben Spray 1 Spray(s) Topical daily    MEDICATIONS  (PRN):  benzocaine/menthol Lozenge 1 Lozenge Oral every 6 hours PRN Sore Throat  HYDROmorphone PCA (1 mG/mL) Rescue Clinician Bolus 0.5 milliGRAM(s) IV Push every 15 minutes PRN for Pain Scale GREATER THAN 6  naloxone Injectable 0.1 milliGRAM(s) IV Push every 3 minutes PRN For ANY of the following changes in patient status:  A. RR LESS THAN 10 breaths per minute, B. Oxygen saturation LESS THAN 90%, C. Sedation score of 6  ondansetron Injectable 4 milliGRAM(s) IV Push every 6 hours PRN Nausea  sodium chloride 0.9% lock flush 10 milliLiter(s) IV Push every 1 hour PRN Pre/post blood products, medications, blood draw, and to maintain line patency      OBJECTIVE: Patient laying in bed. NG tube in place.    Sedation Score:	[ X] Alert	[ ] Drowsy 	[ ] Arousable	[ ] Asleep	[ ] Unresponsive    Side Effects:	[X ] None	[ ] Nausea	[ ] Vomiting	[ ] Pruritus  		[ ] Other:    Vital Signs Last 24 Hrs  T(C): 36.5 (07 Oct 2023 08:00), Max: 36.7 (07 Oct 2023 00:00)  T(F): 97.7 (07 Oct 2023 08:00), Max: 98.1 (07 Oct 2023 00:00)  HR: 85 (07 Oct 2023 11:00) (85 - 115)  BP: 118/69 (07 Oct 2023 11:00) (84/62 - 125/78)  BP(mean): 81 (07 Oct 2023 11:00) (61 - 88)  RR: 14 (07 Oct 2023 11:00) (10 - 21)  SpO2: 100% (07 Oct 2023 11:00) (94% - 100%)    Parameters below as of 07 Oct 2023 11:00  Patient On (Oxygen Delivery Method): room air        ASSESSMENT/ PLAN    Therapy to  be:	[ X] Continue   [ ] Discontinued   [ ] Change to prn Analgesics    Documentation and Verification of current medications:   [X] Done	[ ] Not done, not elligible    Comments: Continue IV PCA. Recommend non-opioid adjuvant analgesics to be used when possible and when allowed by primary surgical team.    Progress Note written now but Patient was seen earlier.

## 2023-10-07 NOTE — PATIENT PROFILE ADULT - PATIENT REPRESENTATIVE: ( YOU CAN CHOOSE ANY PERSON THAT CAN ASSIST YOU WITH YOUR HEALTH CARE PREFERENCES, DOES NOT HAVE TO BE A SPOUSE, IMMEDIATE FAMILY OR SIGNIFICANT OTHER/PARTNER)
declines Xolair Pregnancy And Lactation Text: This medication is Pregnancy Category B and is considered safe during pregnancy. This medication is excreted in breast milk.

## 2023-10-07 NOTE — CONSULT NOTE ADULT - NS ATTEND AMEND GEN_ALL_CORE FT
Shiva Quiroga is a 77-year-old man with CAD s/p PCI with stents to mLAD and OM1 2008 and dRCA in 2018 (on ASA no Plavix or Brilinta), ICD ([employer]) for poss CM, HFrEF 46% with mild/mod LV dysfunction, and afib on warfarin. He presented for exploratory lap and omentectomy for retroperitoneal neoplasm. He underwent exlap and omentectomy 10/6/23 and developed ventricular bigeminy and runs of NSVT- asymptomatic. K was stable, Mg was repleted. Also with post-op hypotension which responded to brief trial of Levophed. ECG is without ischemic ST-segment or T-wave changes, despite noted elevation of HSTropT and CK/CKMBs (now trending down). There is no chest pain, SOB, or palpitations. Overall findings suggest no significant potential benefit to starting antiplatet therapy or anticoagulation with heparin, both of which may provoke post-op bleeding. Maintain serum K > 4.0 mmol/L, Mg > 2.0 mg/dL. Continue telemetry. Maintain Lopressor 5 mg IV Q6h and once able to take oral medication, may transition back to carvedilol
143.9

## 2023-10-07 NOTE — PROGRESS NOTE ADULT - SUBJECTIVE AND OBJECTIVE BOX
Surgery Progress Note     Subjective:  Patient seen and examined.       Vital Signs:  Vital Signs Last 24 Hrs  T(C): 36.5 (07 Oct 2023 16:00), Max: 36.7 (07 Oct 2023 00:00)  T(F): 97.7 (07 Oct 2023 16:00), Max: 98.1 (07 Oct 2023 00:00)  HR: 95 (07 Oct 2023 18:00) (85 - 115)  BP: 134/84 (07 Oct 2023 14:23) (84/62 - 134/84)  BP(mean): 99 (07 Oct 2023 14:23) (61 - 99)  RR: 21 (07 Oct 2023 18:00) (12 - 21)  SpO2: 96% (07 Oct 2023 18:00) (93% - 100%)    Parameters below as of 07 Oct 2023 14:23  Patient On (Oxygen Delivery Method): room air        CAPILLARY BLOOD GLUCOSE          I&O's Detail    06 Oct 2023 07:01  -  07 Oct 2023 07:00  --------------------------------------------------------  IN:    IV PiggyBack: 750 mL    sodium chloride 0.9%: 1230 mL  Total IN: 1980 mL    OUT:    Indwelling Catheter - Urethral (mL): 1090 mL    Nasogastric/Oral tube (mL): 150 mL  Total OUT: 1240 mL    Total NET: 740 mL      07 Oct 2023 07:01  -  07 Oct 2023 18:42  --------------------------------------------------------  IN:    IV PiggyBack: 100 mL    sodium chloride 0.9%: 825 mL  Total IN: 925 mL    OUT:    Indwelling Catheter - Urethral (mL): 2000 mL    Nasogastric/Oral tube (mL): 200 mL  Total OUT: 2200 mL    Total NET: -1275 mL            Physical Exam:  General: NAD, resting comfortably in bed  Respiratory: Nonlabored respirations  Cardio: pulse present  Abdomen: softly distended, appropriately tender, surgical incisions are c/d/i.  Vascular: extremities are warm and well perfused.       Labs:    10-07    137  |  106  |  12  ----------------------------<  157<H>  4.6   |  18<L>  |  0.95    Ca    8.4      07 Oct 2023 01:08  Phos  4.0     10-07  Mg     2.30     10-07                              11.5   10.85 )-----------( 196      ( 07 Oct 2023 01:08 )             35.4     PT/INR - ( 06 Oct 2023 06:11 )   PT: 14.2 sec;   INR: 1.28 ratio         PTT - ( 06 Oct 2023 06:11 )  PTT:32.1 sec     Surgery Progress Note     Subjective:  Patient seen and examined. Patient has no concerns this AM. Denies nausea, vomiting, and pain.      Vital Signs:  Vital Signs Last 24 Hrs  T(C): 36.5 (07 Oct 2023 16:00), Max: 36.7 (07 Oct 2023 00:00)  T(F): 97.7 (07 Oct 2023 16:00), Max: 98.1 (07 Oct 2023 00:00)  HR: 95 (07 Oct 2023 18:00) (85 - 115)  BP: 134/84 (07 Oct 2023 14:23) (84/62 - 134/84)  BP(mean): 99 (07 Oct 2023 14:23) (61 - 99)  RR: 21 (07 Oct 2023 18:00) (12 - 21)  SpO2: 96% (07 Oct 2023 18:00) (93% - 100%)    Parameters below as of 07 Oct 2023 14:23  Patient On (Oxygen Delivery Method): room air        CAPILLARY BLOOD GLUCOSE          I&O's Detail    06 Oct 2023 07:01  -  07 Oct 2023 07:00  --------------------------------------------------------  IN:    IV PiggyBack: 750 mL    sodium chloride 0.9%: 1230 mL  Total IN: 1980 mL    OUT:    Indwelling Catheter - Urethral (mL): 1090 mL    Nasogastric/Oral tube (mL): 150 mL  Total OUT: 1240 mL    Total NET: 740 mL      07 Oct 2023 07:01  -  07 Oct 2023 18:42  --------------------------------------------------------  IN:    IV PiggyBack: 100 mL    sodium chloride 0.9%: 825 mL  Total IN: 925 mL    OUT:    Indwelling Catheter - Urethral (mL): 2000 mL    Nasogastric/Oral tube (mL): 200 mL  Total OUT: 2200 mL    Total NET: -1275 mL            Physical Exam:  General: NAD, resting comfortably in bed  Respiratory: Nonlabored respirations  Cardio: pulse present  Abdomen: softly distended, appropriately tender, surgical incisions are c/d/i.  Vascular: extremities are warm and well perfused.       Labs:    10-07    137  |  106  |  12  ----------------------------<  157<H>  4.6   |  18<L>  |  0.95    Ca    8.4      07 Oct 2023 01:08  Phos  4.0     10-07  Mg     2.30     10-07                              11.5   10.85 )-----------( 196      ( 07 Oct 2023 01:08 )             35.4     PT/INR - ( 06 Oct 2023 06:11 )   PT: 14.2 sec;   INR: 1.28 ratio         PTT - ( 06 Oct 2023 06:11 )  PTT:32.1 sec

## 2023-10-07 NOTE — CONSULT NOTE ADULT - ASSESSMENT
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 Patient states he has been in good health and denies any cardiac issues, sees his cardiologist regularly. No change in medications and has not missed any meds. Denies chest pain, SOB, or palpitations and completed a full cardiac workup prior to surgery.  On 10/6 patient underwent exlap and omentectomy and developed ventricular bigeminy and runs of NSVT- asymptomatic. K was stable, Mg was repleted. Also with hypotension post surgery which responded to brief trial of levophed and patient has remained stable in the PACU. EKG is without ischemic changes although troponins and CK/CKMBs are elevated post op with troponins now trending down. Continues to deny chest pain, SOB, or palpitations.  Cardiology is consulted for elevated cardiac enzymes and ventricular arrhythmia.    D/W Dr. Ward, cardio attendin. would maintain K > 4.0, Mg > 2.0. Continuous telemetry  2. although no EKG changes would continue to trend cardiac enzymes and serial EKGs at least X another 24 hours  3. Continue Lopressor 5 mg IV Q6h and once able to take po would transition back to carvedilol  4. can consider repeat TTE to assess for any worsening of LV function although patient appears euvolemic at present  5. Unless there is a significant rise in cardiac enyzmes, patient develops chest pain or other instability would hold off on any ACS protocol at present given potential risk of bleeding post operatively.    Cardiology service will continue to follow.       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 Patient states he has been in good health and denies any cardiac issues, sees his cardiologist regularly. No change in medications and has not missed any meds. Denies chest pain, SOB, or palpitations and completed a full cardiac workup prior to surgery.  On 10/6 patient underwent exlap and omentectomy and developed ventricular bigeminy and runs of NSVT- asymptomatic. K was stable, Mg was repleted. Also with hypotension post surgery which responded to brief trial of levophed and patient has remained stable in the PACU. EKG is without ischemic changes although troponins and CK/CKMBs are elevated post op with troponins now trending down. Continues to deny chest pain, SOB, or palpitations.  Cardiology is consulted for elevated cardiac enzymes and ventricular arrhythmia.    D/W Dr. Ward, cardio attendin. would maintain K > 4.0, Mg > 2.0. Continuous telemetry. Can consider ICD interrogation MONDAY to assess arrhythmia burden if any. (call [id number])  2. although no EKG changes would continue to trend cardiac enzymes and serial EKGs at least X another 24 hours  3. Continue Lopressor 5 mg IV Q6h and once able to take po would transition back to carvedilol  4. can consider repeat TTE to assess for any worsening of LV function although patient appears euvolemic at present  5. Unless there is a significant rise in cardiac enyzmes, patient develops chest pain or other instability would hold off on any ACS protocol at present given potential risk of bleeding post operatively.    Cardiology service will continue to follow.       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 Patient states he has been in good health and denies any cardiac issues, sees his cardiologist regularly. No change in medications and has not missed any meds. Denies chest pain, SOB, or palpitations and completed a full cardiac workup prior to surgery.  On 10/6 patient underwent exlap and omentectomy and developed ventricular bigeminy and runs of NSVT- asymptomatic. K was stable, Mg was repleted. Also with hypotension post surgery which responded to brief trial of levophed and patient has remained stable in the PACU. EKG is without ischemic changes although troponins and CK/CKMBs are elevated post op with troponins now trending down. Continues to deny chest pain, SOB, or palpitations.  Cardiology is consulted for elevated cardiac enzymes and ventricular arrhythmia.    D/W Dr. Ward, cardio attendin. would maintain K > 4.0, Mg > 2.0. Continuous telemetry. Can consider ICD interrogation MONDAY to assess arrhythmia burden if any. (call [id number])  2. although no EKG changes would continue to trend cardiac enzymes and serial EKGs at least X another 24 hours  3. Continue Lopressor 5 mg IV Q6h and once able to take po would transition back to carvedilol  4. can consider repeat TTE to assess for any worsening of LV function   5. Unless there is a significant rise in cardiac enyzmes, patient develops chest pain or other instability would hold off on any ACS protocol at present given potential risk of bleeding post operatively.    Cardiology service will continue to follow.       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 Patient states he has been in good health and denies any cardiac issues, sees his cardiologist regularly. No change in medications and has not missed any meds. Denies chest pain, SOB, or palpitations and completed a full cardiac workup prior to surgery.  On 10/6 patient underwent exlap and omentectomy and developed ventricular bigeminy and runs of NSVT- asymptomatic. K was stable, Mg was repleted. Also with hypotension post surgery which responded to brief trial of levophed and patient has remained stable in the PACU. EKG is without ischemic changes although troponins and CK/CKMBs are elevated post op with troponins now trending down. Continues to deny chest pain, SOB, or palpitations.  Cardiology is consulted for elevated cardiac enzymes and ventricular arrhythmia.    D/W Dr. Ward, cardio attendin. would maintain K > 4.0, Mg > 2.0. Continuous telemetry. Can consider ICD interrogation MONDAY to assess arrhythmia burden if any. (call [id number])  2. although no EKG changes would continue to trend cardiac enzymes and serial EKGs at least X another 24 hours  3. Continue Lopressor 5 mg IV Q6h and once able to take po would transition back to carvedilol  4. can consider repeat TTE to assess for any worsening of LV function   5. Unless there is a significant rise in cardiac enyzmes, patient develops chest pain or other instability would hold off on any ACS protocol at present given potential risk of bleeding post operatively.    Cardiology service will continue to follow.       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 Patient states he has been in good health and denies any cardiac issues, sees his cardiologist regularly. No change in medications and has not missed any meds. Denies chest pain, SOB, or palpitations and completed a full cardiac workup prior to surgery.  On 10/6 patient underwent exlap and omentectomy and developed ventricular bigeminy and runs of NSVT- asymptomatic. K was stable, Mg was repleted. Also with hypotension post surgery which responded to brief trial of levophed and patient has remained stable in the PACU. EKG is without ischemic changes although troponins and CK/CKMBs are elevated post op with troponins now trending down. Continues to deny chest pain, SOB, or palpitations.  Cardiology is consulted for elevated cardiac enzymes and ventricular arrhythmia.    D/W Dr. Ward, cardio attendin. would maintain K > 4.0, Mg > 2.0. Continuous telemetry. Can consider ICD interrogation MONDAY to assess arrhythmia burden if any. (call [id number])  2. although no EKG changes would continue to trend cardiac enzymes and serial EKGs at least X another 24 hours  3. Continue Lopressor 5 mg IV Q6h and once able to take po would transition back to carvedilol  4. can consider repeat TTE to assess for any worsening of LV function   5. Unless there is a significant rise in cardiac enyzmes, patient develops chest pain or other instability would hold off on any ACS protocol at present given potential risk of bleeding post operatively. If ACS protocol were necessary patient would need to be cleared for AC/ DAPT by surgical team.    Cardiology service will continue to follow.

## 2023-10-07 NOTE — PROGRESS NOTE ADULT - SUBJECTIVE AND OBJECTIVE BOX
SICU Daily Progress Note  =====================================================  24hrs events  - Runs of Vtach and non symptomatic--> 2g Mag given  -Hypotensive--> POCUS showing hyperdynamic ventricles--> 500cc bolus    ALLERGIES:  No Known Allergies      --------------------------------------------------------------------------------------    MEDICATIONS:    Neurologic Medications  acetaminophen   IVPB .. 1000 milliGRAM(s) IV Intermittent every 6 hours  HYDROmorphone  Injectable 0.5 milliGRAM(s) IV Push every 10 minutes PRN Moderate Pain (4 - 6)  HYDROmorphone  Injectable 1 milliGRAM(s) IV Push every 10 minutes PRN Severe Pain (7 - 10)  HYDROmorphone PCA (1 mG/mL) 30 milliLiter(s) PCA Continuous PCA Continuous  HYDROmorphone PCA (1 mG/mL) Rescue Clinician Bolus 0.5 milliGRAM(s) IV Push every 15 minutes PRN for Pain Scale GREATER THAN 6  ondansetron Injectable 4 milliGRAM(s) IV Push every 6 hours PRN Nausea    Respiratory Medications    Cardiovascular Medications    Gastrointestinal Medications  lactated ringers Bolus 500 milliLiter(s) IV Bolus once  sodium chloride 0.9% lock flush 10 milliLiter(s) IV Push every 1 hour PRN Pre/post blood products, medications, blood draw, and to maintain line patency  sodium chloride 0.9%. 1000 milliLiter(s) IV Continuous <Continuous>    Genitourinary Medications    Hematologic/Oncologic Medications    Antimicrobial/Immunologic Medications    Endocrine/Metabolic Medications    Topical/Other Medications  chlorhexidine 2% Cloths 1 Application(s) Topical daily  chlorhexidine 4% Liquid 1 Application(s) Topical <User Schedule>  naloxone Injectable 0.1 milliGRAM(s) IV Push every 3 minutes PRN For ANY of the following changes in patient status:  A. RR LESS THAN 10 breaths per minute, B. Oxygen saturation LESS THAN 90%, C. Sedation score of 6  tetracaine/benzocaine/butamben Spray 1 Spray(s) Topical daily    --------------------------------------------------------------------------------------    VITAL SIGNS:  ICU Vital Signs Last 24 Hrs  T(C): 36.7 (07 Oct 2023 00:00), Max: 37.1 (06 Oct 2023 06:00)  T(F): 98.1 (07 Oct 2023 00:00), Max: 98.7 (06 Oct 2023 06:00)  HR: 99 (07 Oct 2023 00:00) (86 - 111)  BP: 90/62 (07 Oct 2023 00:00) (84/62 - 125/78)  BP(mean): 67 (07 Oct 2023 00:00) (64 - 88)  ABP: 86/45 (07 Oct 2023 00:00) (82/44 - 117/60)  ABP(mean): 60 (07 Oct 2023 00:00) (58 - 86)  RR: 14 (07 Oct 2023 00:00) (10 - 19)  SpO2: 100% (07 Oct 2023 00:00) (94% - 100%)    O2 Parameters below as of 07 Oct 2023 00:00  Patient On (Oxygen Delivery Method): room air    --------------------------------------------------------------------------------------    INS AND OUTS:  I&O's Detail    06 Oct 2023 07:01  -  07 Oct 2023 00:32  --------------------------------------------------------  IN:    IV PiggyBack: 400 mL    sodium chloride 0.9%: 855 mL  Total IN: 1255 mL    OUT:    Indwelling Catheter - Urethral (mL): 610 mL    Nasogastric/Oral tube (mL): 100 mL  Total OUT: 710 mL    Total NET: 545 mL    --------------------------------------------------------------------------------------    EXAM:  General/Neuro  Exam: Normal, NAD, alert, oriented x 3, no focal deficits. PERRLA      Respiratory  Exam: Lungs clear to auscultation, Normal expansion/effort.      Cardiovascular  Exam: S1, S2.  Regular rate and rhythm.  Peripheral edema      GI  Exam: Abdomen soft, Non-tender, Non-distended.  Gastrostomy / Jejunostomy tube in place.  Nasogastric tube in place.    Current Diet:  NPO      METABOLIC / FLUIDS / ELECTROLYTES  lactated ringers Bolus 500 milliLiter(s) IV Bolus once  sodium chloride 0.9%. 1000 milliLiter(s) IV Continuous <Continuous>      HEMATOLOGIC  [x] VTE Prophylaxis:   Transfusions:	[] PRBC	[] Platelets		[] FFP	[] Cryoprecipitate    INFECTIOUS DISEASE  Antimicrobials/Immunologic Medications:    Day #      of     ***    TUBES / LINES / DRAINS  ***  [x] Peripheral IV  [] Central Venous Line     	[] R	[] L	[] IJ	[] Fem	[] SC	Date Placed:   [] Arterial Line		[] R	[] L	[] Fem	[] Rad	[] Ax	Date Placed:   [] PICC		[] Midline		[] Mediport  [] Urinary Catheter		Date Placed:   [x] Necessity of urinary, arterial, and venous catheters discussed    --------------------------------------------------------------------------------------    LABS                          11.9   10.12 )-----------( 186      ( 06 Oct 2023 12:48 )             35.7   10-06    137  |  106  |  11  ----------------------------<  149<H>  4.4   |  20<L>  |  0.87    Ca    8.8      06 Oct 2023 20:52  Phos  3.8     10-06  Mg     1.90     10-06      --------------------------------------------------------------------------------------    OTHER LABORATORY:     IMAGING STUDIES:   CXR:

## 2023-10-07 NOTE — CONSULT NOTE ADULT - SUBJECTIVE AND OBJECTIVE BOX
Date of Admission: 9/25/2023    CHIEF COMPLAINT: bigeminy    HISTORY OF PRESENT ILLNESS: 77M with CAD and stents to mLAD and OM1 in 2008 and dRCA in 2018, ICD (Medtronic) for ?CM, HFrEF 46% with mild/mod LV dysfunction, and afib on coumadin presents for exploratory lap and omentectomy for retroperitoneal neoplasm. Patient states he has been in good health and denies any cardiac issues, sees his cardiologist regularly. No change in medications and has not missed any meds. Denies chest pain, SOB, or palpitations and completed a full cardiac workup prior to surgery.    On 10/6 patient underwent exlap and omentectomy and developed ventricular bigeminy and runs of NSVT- asymptomatic. K was stable, Mg was repleted. Also with hypotension post surgery which responded to brief trial of levophed and patient has remained stable in the PACU. EKG is without ischemic changes although troponins and CK/CKMBs are elevated post op with troponins now trending down. Continues to deny chest pain, SOB, or palpitations.    Cardiology is consulted for elevated cardiac enzymes and ventricular arrhythmia.      Allergies    No Known Allergies    Intolerances    	    MEDICATIONS:  enoxaparin Injectable 40 milliGRAM(s) SubCutaneous every 24 hours  metoprolol tartrate Injectable 5 milliGRAM(s) IV Push every 6 hours    acetaminophen   IVPB .. 1000 milliGRAM(s) IV Intermittent every 6 hours  HYDROmorphone PCA (1 mG/mL) 30 milliLiter(s) PCA Continuous PCA Continuous  HYDROmorphone PCA (1 mG/mL) Rescue Clinician Bolus 0.5 milliGRAM(s) IV Push every 15 minutes PRN  ondansetron Injectable 4 milliGRAM(s) IV Push every 6 hours PRN        benzocaine/menthol Lozenge 1 Lozenge Oral every 6 hours PRN  chlorhexidine 2% Cloths 1 Application(s) Topical daily  chlorhexidine 4% Liquid 1 Application(s) Topical <User Schedule>  sodium chloride 0.9% lock flush 10 milliLiter(s) IV Push every 1 hour PRN  sodium chloride 0.9%. 1000 milliLiter(s) IV Continuous <Continuous>  tetracaine/benzocaine/butamben Spray 1 Spray(s) Topical daily      PAST MEDICAL & SURGICAL HISTORY:  Dyslipidemia      Benign prostatic hypertrophy      CAD (coronary artery disease)      Atrial fibrillation      CHF (congestive heart failure)  2008 Tx with stent and ICD      HTN (hypertension)      Arteriosclerotic heart disease (ASHD)      History of pancreatitis      CAD (coronary artery disease)      History of secondary malignant neoplasm of retroperitoneum and peritoneum      AICD (automatic cardioverter/defibrillator) present      AICD (automatic cardioverter/defibrillator) present  battery changed 2014      History of coronary artery stent placement  in 2008 x 2 stents      Tibia/fibula fracture  left 2004      History of cholecystectomy  2014 complicated with septic shock      History of appendectomy  2018      H/O left inguinal hernia repair          FAMILY HISTORY:      SOCIAL HISTORY:    Smoker  unknown  Alcohol unknown  Drugs unknown      REVIEW OF SYSTEMS:  See HPI. Otherwise, 10 point ROS done and otherwise negative.    PHYSICAL EXAM:  T(C): 36.7 (10-07-23 @ 12:00), Max: 36.7 (10-07-23 @ 00:00)  HR: 89 (10-07-23 @ 13:00) (85 - 115)  BP: 123/63 (10-07-23 @ 13:00) (84/62 - 123/63)  RR: 20 (10-07-23 @ 13:00) (12 - 21)  SpO2: 100% (10-07-23 @ 13:00) (94% - 100%)  Wt(kg): --  I&O's Summary      Appearance: NAD, skin W & D	  HEENT:   Normal oral mucosa, PERRL, EOMI	  Cardiovascular: Normal S1 S2, No JVD, No murmurs, No edema  Respiratory: Lungs clear to auscultation	  Psychiatry: A & O x 3, Mood & affect appropriate  Gastrointestinal:  soft, mild tenderness, abd dressing in place	  Skin: No rashes, No ecchymoses, No cyanosis	  Neurologic: Non-focal  Extremities: Normal range of motion, No clubbing, cyanosis or edema          LABS:	 	                        11.5   10.85 )-----------( 196      ( 07 Oct 2023 01:08 )             35.4       10-07    137  |  106  |  12  ----------------------------<  157<H>  4.6   |  18<L>  |  0.95    10-06    137  |  106  |  11  ----------------------------<  149<H>  4.4   |  20<L>  |  0.87    Ca    8.4      07 Oct 2023 01:08  Ca    8.8      06 Oct 2023 20:52  Phos  4.0     10-07  Phos  3.8     10-06  Mg     2.30     10-07  Mg     1.90     10-06          CARDIAC MARKERS:  Troponin 218/266/235/195    Creatine Kinase, Serum: 443 U/L (10-07 @ 01:08)  Creatine Kinase, Serum: 368 U/L (10-06 @ 20:52)    TELEMETRY: NSR 1st degree AVHB      ECG:  	NSR with 1st degree AVHB, no evidence of ischemic changes      PREVIOUS DIAGNOSTIC TESTING:    [ ] Echocardiogram:  TRANSTHORACIC ECHOCARDIOGRAM REPORT  ________________________________________________________________________________                                      _______       Pt. Name:       JONNIE ARMSTRONG Study Date:    9/22/2023  MRN:            [id number]      YOB: 1950  Accession #:    [id number]       Age:           73 years  Account#:       [id number]    Gender:        M  Heart Rate:     75 bpm          Height:        65.00 in (165.10 cm)  Rhythm:         sinus rhythm    Weight:       164.00 lb (74.39 kg)  Blood Pressure: 136/83 mmHg     BSA/BMI:       1.82 m² / 27.29 kg/m²  ________________________________________________________________________________________  Referring Physician:    [id number] Steffen Gaitan  Interpreting Physician: Karyn Brown  Primary Sonographer:    Era Beltre [facility]    CPT:                ECHO TTE WITH CON COMP W DOPP - .m;DEFINITY ECHO                      CONTRAST PER ML - .m;DEFINITY ECHO CONTRAST PER ML                      WASTED - .m  Indication(s):      Encounter for other preprocedural examination - Z01.818  Procedure:          Transthoracic echocardiogram with 2-D, M-mode and complete                      spectral and color flow Doppler.  Ordering Location:  [facility]/  Admission Status:   Outpatient  Contrast Injection: Verbal consent was obtained for injection of Ultrasonic                      Enhancing Agent following a discussion of risks and                      benefits.                      Endocardial visualization enhanced with 3 ml of Definity                      Ultrasound enhancing agent (Lot#:[id number] Exp.Date:09/01/2024                      Discarded Dose:7ml).  UEA Reaction:       Patient had no adverse reaction after injection of   Ultrasound Enhancing Agent.  Study Information:  Image quality for this study is fair.    _______________________________________________________________________________________     CONCLUSIONS:      1. Left ventricular systolic function is mildly decreased with an ejection fraction of 46 % by Rudolph's method of disks. There is global left ventricular hypokinesis. No evidence of a thrombus in the left ventricle.   2. There is mild (grade 1) left ventricular diastolic dysfunction.   3. Right ventricular cavity is normal in size, normal wall thickness and normal systolic function.   4. Normal atria.   5. There is trace mitral regurgitation.   6. There is trace tricuspid regurgitation. Estimated pulmonary artery systolic pressure is 24 mmHg.  7. 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 The ascending aorta diameter is dilated, measuring 3.80 cm (indexed 2.09 cm/m²).   8. No pericardial effusion seen.   9. Compared to the transthoracic echocardiogram performed on 6/19/2015 there have been no significant interval changes.    ________________________________________________________________________________________  FINDINGS:     Left Ventricle:  Normal left ventricular cavity size. Leftventricular systolic function is mildly decreased with a calculated ejection fraction of 46 % by the Rudolph's biplane method of disks. There is global left ventricular hypokinesis. There is mild (grade 1) left ventricular diastolic dysfunction. No left ventricular hypertrophy. There is normal LV mass and normal geometry. There is no evidence of a thrombus in the left ventricle.     Right Ventricle:  The right ventricular cavity is normal in size, normal wall thickness and normal systolic function. Tricuspid annular plane systolic excursion (TAPSE) is 2.8 cm (normal >=1.7 cm). A device lead is visualized in the right heart.     Aortic Valve:  The aortic valve is tricuspid with normal structure without stenosis. There is no evidence of aortic regurgitation.     Mitral Valve:  Structurally normal mitral valve with normal leaflet excursion. There is trace mitral regurgitation.     Tricuspid Valve:  Structurally normal tricuspid valve with normal leaflet excursion. There is trace tricuspid regurgitation. Estimated pulmonary artery systolic pressure is 24 mmHg.     Pulmonic Valve:  Structurally normal pulmonic valve with normal leaflet excursion. There is trace pulmonic regurgitation.     Aorta:  The aortic root at the sinuses of Valsalva is dilated, measuring 3.80 cm (indexed 2.09 cm/m²). The ascending aorta diameter is dilated, measuring 3.80 cm (indexed 2.09 cm/m²).     Pericardium:  No pericardial effusion seen.     Systemic Veins:  The inferior vena cava is normal in size (normal <2.1cm) with normal inspiratory collapse (normal >50%) consistent with normal right atrial pressure (~3, range 0-5mmHg).  ____________________________________________________________________  Quantitative Data:  Left Ventricle Measurements: (Indexed to BSA)     IVSd (2D):   0.6 cm  LVPWd (2D):  0.7 cm  LVIDd (2D):  4.9 cm  LVIDs (2D):  4.3 cm  LV Mass:     106 g  58.4 g/m²  LV Vol d, MOD A2C: 80.3 ml 44.17 ml/m²  LV Vol d, MOD A4C: 67.5 ml 37.13 ml/m²  LV Vol d, MOD BP:  73.2 ml 40.26 ml/m²  LV Vol s, MOD A2C: 38.7 ml 21.29 ml/m²  LV Vol s, MOD A4C: 36.4 ml 20.02 ml/m²  LV Vol s, MOD BP:  39.4 ml 21.66 ml/m²  LVOT SV MOD BP:    33.8 ml  LV EF% MOD BP:     46 %     MV E Vmax:    0.65 m/s  MV A Vmax:    0.76 m/s  MV E/A:       0.86  e' lateral: 9.14 cm/s  e' medial:    7.51 cm/s  E/e' lateral: 7.11  E/e' medial:  8.66  E/e' Average: 7.81  MV DT:        203 msec    Aorta Measurements: (normal range) (Indexed to BSA)     Sinuses of Valsalva: 3.80 cm (3.1 - 3.7 cm)  Ao Asc prox:         3.80 cm       Left Atrium Measurements: (Indexed to BSA)  LA Diam 2D: 3.50 cm    Right Ventricle Measurements:     TAPSE:      2.8 cm  TV Yessica. S': 12.20 cm/s       LVOT / RVOT/ Qp/Qs Data: (Indexed to BSA)  LVOT Diameter: 2.20 cm  LVOT Vmax:     0.68 m/s  LVOT VTI:      15.20 cm  LVOT SV:       57.8 ml  31.78 ml/m²    Mitral Valve Measurements:     MV E Vmax: 0.6 m/s  MV A Vmax: 0.8 m/s  MV E/A:    0.9       Tricuspid Valve Measurements:     TR Vmax:          2.3 m/s  TR Peak Gradient: 20.8 mmHg  RA Pressure:      3 mmHg  PASP:             24 mmHg    ________________________________________________________________________________________  Electronically signed on 9/22/2023 at 3:37:17 PM by Karyn Brown         *** Final ***    [ ] Stress Test:  	  	  	            ADDENDUM TO PRIOR REPORT  Reason for amended report: add prone imaging.     [id number] Stef Han on 9/22/2023 at 2:22:55 PM  ________________________________________________________________________________________  Nuclear Pharmacologic Stress Test Report         Patient: JONNIE ARMSTRONG                 Study Date: 9/22/2023           MRN: [id number]                            Birth 1950 Age: 73 years                                                  Date/Age:      Access #: [id number]                           Gender: M     Order Loc: [facility]/M                               Height: 165.1 cm/ 65.0 in  Request Phys: [id number] Steffen Vavasis         Weight: 74.39 kg/ 164.00 lb     Procedure: Rest|Stress SESTAMIBI             BSA/ BMI: 1.82 m²/ 27.29 kg/m²    Indication: Dyspnea, unspecified -       Admiss Status: Outpatient                R06.00    Fellow/ACP: Sidney RECINOS               Fellow/ACP:    ---------------------------------------------------------------------------------------------------------------------------------------------------------  Procedure Code: STRESS ECHO EKG - 99324.m; MYOCARDIAL SPECT MULTIPLE - 17748.m;                  INJECTION REGADENOSON - .m; TC 99M SESTAMIBI PER DOSE -                  .m; TC 99M SESTAMIBI PER DOSE 2nd - .m    ---------------------------------------------------------------------------------------------------------------------------------------------------------  History:       Known coronary artery disease. 74 y/o M with Pmhx of CAD with                 stents, ischemic CM, ICD, HLD, PAfib, BPH, mucinous GI carcinoma                 presents for ischemic evaluation for pre-op clearance for                 abdominal surgery.  Image Quality: Good  Medications:   ASA, Tamsulosin, Vit D, Coumadin, ASA, Coreg, Lisinopril, KCL,                 Zocor.  Allergies:     None    --------------------------------------------------------------------------------------------------------------------------------------------------------Conclusions:   1. Qualitative Perfusion:      - medium-sized, moderate defect(s) in the lateral and inferolateral walls that are fixed consistent with an infarct.   2. The left ventricle is moderately decreased in function and normal in size. The left ventricular EF% during stress is 42 %.   3. Hypokinesis of the inferolateral wall.    ---------------------------------------------------------------------------------------------------------------------------------------------------------     Stress Test Results:  Pretest Chest Pain: None.       Pharmacological Stress Test Results:                Protocol: IV regadenoson (bolus injection, 400mcg over 10 to 20                          seconds followed by 5ml flush)              Heart Rate: Resting 65 bpm, Stress peak 96 bpm and (66 % max                          predicted).          Blood Pressure: Resting 123/78 mmHg and Stress max 90/48 mmHg.  Symptoms During Stress: Stomach bloating.  Reason for Termination: Completion of protocol.       Protocol: Regadenoson Injection  +--------+--------------+----------------+--------------+  |  Time  |              |Heart Rate (bpm)|Blood Pressure|  +--------+--------------+----------------+--------------+  |Baseline|Pre-Injection |       65       |    123/78    |  +--------+--------------+----------------+--------------+  | 01:00  |Post Injection|       71       |    119/95    |  +--------+--------------+----------------+--------------+  | 02:00  |Post Injection|       96       |    90/48     |  +--------+--------------+----------------+--------------+  | 03:00  |Post Injection|       98       |    99/72     |  +--------+--------------+----------------+--------------+  | 04:00  |Post Injection|       93       |    105/69    |  +--------+--------------+----------------+--------------+  | 05:00  |   Recovery   |       94       |    109/74    |  +--------+--------------+----------------+--------------+  | 06:00  |   Recovery   |       86       |    110/70    |  +--------+--------------+----------------+--------------+  | 07:00  |   Recovery   |       86       |    104/76    |  +--------+--------------+----------------+--------------+  | 10:00  |   Recovery   |       96       |    105/69    |  +--------+--------------+----------------+--------------+    ---------------------------------------------------------------------------------------------------------------------------------------------------------Electrocardiogram:  Baseline electrocardiogram: Sinus rhythm with first degree AV block and low QRS  voltage.  Stress electrocardiogram: No arrhythmia associated with stress.    ---------------------------------------------------------------------------------------------------------------------------------------------------------  Procedure:  The patient was given 7.27 mCi of TC-99M Sestamibi intravenously at rest on 9/22/2023 at 12:22:54 PM. Approximately 60 minutes later, solid state SPECT images were obtained, with patient in supine position. 25.06 mCi of TC-99M Sestamibi was given intravenously at stress on 9/22/2023. After 65 min minutes, solid state gated SPECT images were obtained and assessed for myocardial perfusion and function, with patient in supine position. Images were reacquired with the patient in a prone position.  ---------------------------------------------------------------------------------------------------------------------------------------------------------  Perfusion:  Qualitative Findings:  There are medium-sized, moderate defect(s) in the lateral and inferolateral walls that are fixed consistent with an infarct.                   SSS  SRS     Summed Score:  5    5     SSS: 5 SRS: 5 SDS: 0       Quantitative Imaging Findings    TID Stress  1.11  TID greater than 1.2-1.25 is generally considered abnormal.       Left Ventricular Motion: Hypokinesis of the inferolateral wall.  ---------------------------------------------------------------------------------------------------------------------------------------------------------     Ventricular Function: The left ventricle is moderately decreased in function and normal in size. The stress left ventricular EF% is 42 %. The stress end diastolic volume is 91 ml and systolic volume is 53 ml.     Interpreting Provider: Electronically signed on 9/22/2023 at 12:50:34 PM by Stef Han              Date of Admission: 9/25/2023    CHIEF COMPLAINT: bigeminy    HISTORY OF PRESENT ILLNESS: 77M with CAD and stents to mLAD and OM1 in 2008 and dRCA in 2018 (on ASA no Plavix or Brilinta), ICD (Medtronic) for ?CM, HFrEF 46% with mild/mod LV dysfunction, and afib on coumadin presents for exploratory lap and omentectomy for retroperitoneal neoplasm. Patient states he has been in good health and denies any cardiac issues, sees his cardiologist regularly. No change in medications and has not missed any meds. Denies chest pain, SOB, or palpitations and completed a full cardiac workup prior to surgery.    On 10/6 patient underwent exlap and omentectomy and developed ventricular bigeminy and runs of NSVT- asymptomatic. K was stable, Mg was repleted. Also with hypotension post surgery which responded to brief trial of levophed and patient has remained stable in the PACU. EKG is without ischemic changes although troponins and CK/CKMBs are elevated post op with troponins now trending down. Continues to deny chest pain, SOB, or palpitations.    Cardiology is consulted for elevated cardiac enzymes and ventricular arrhythmia.      Allergies    No Known Allergies    Intolerances    	    MEDICATIONS:  enoxaparin Injectable 40 milliGRAM(s) SubCutaneous every 24 hours  metoprolol tartrate Injectable 5 milliGRAM(s) IV Push every 6 hours    acetaminophen   IVPB .. 1000 milliGRAM(s) IV Intermittent every 6 hours  HYDROmorphone PCA (1 mG/mL) 30 milliLiter(s) PCA Continuous PCA Continuous  HYDROmorphone PCA (1 mG/mL) Rescue Clinician Bolus 0.5 milliGRAM(s) IV Push every 15 minutes PRN  ondansetron Injectable 4 milliGRAM(s) IV Push every 6 hours PRN        benzocaine/menthol Lozenge 1 Lozenge Oral every 6 hours PRN  chlorhexidine 2% Cloths 1 Application(s) Topical daily  chlorhexidine 4% Liquid 1 Application(s) Topical <User Schedule>  sodium chloride 0.9% lock flush 10 milliLiter(s) IV Push every 1 hour PRN  sodium chloride 0.9%. 1000 milliLiter(s) IV Continuous <Continuous>  tetracaine/benzocaine/butamben Spray 1 Spray(s) Topical daily      PAST MEDICAL & SURGICAL HISTORY:  Dyslipidemia      Benign prostatic hypertrophy      CAD (coronary artery disease)      Atrial fibrillation      CHF (congestive heart failure)  2008 Tx with stent and ICD      HTN (hypertension)      Arteriosclerotic heart disease (ASHD)      History of pancreatitis      CAD (coronary artery disease)      History of secondary malignant neoplasm of retroperitoneum and peritoneum      AICD (automatic cardioverter/defibrillator) present      AICD (automatic cardioverter/defibrillator) present  battery changed 2014      History of coronary artery stent placement  in 2008 x 2 stents      Tibia/fibula fracture  left 2004      History of cholecystectomy  2014 complicated with septic shock      History of appendectomy  2018      H/O left inguinal hernia repair          FAMILY HISTORY:      SOCIAL HISTORY:    Smoker  unknown  Alcohol unknown  Drugs unknown      REVIEW OF SYSTEMS:  See HPI. Otherwise, 10 point ROS done and otherwise negative.    PHYSICAL EXAM:  T(C): 36.7 (10-07-23 @ 12:00), Max: 36.7 (10-07-23 @ 00:00)  HR: 89 (10-07-23 @ 13:00) (85 - 115)  BP: 123/63 (10-07-23 @ 13:00) (84/62 - 123/63)  RR: 20 (10-07-23 @ 13:00) (12 - 21)  SpO2: 100% (10-07-23 @ 13:00) (94% - 100%)  Wt(kg): --  I&O's Summary      Appearance: NAD, skin W & D	  HEENT:   Normal oral mucosa, PERRL, EOMI	  Cardiovascular: Normal S1 S2, No JVD, No murmurs, No edema  Respiratory: Lungs clear to auscultation	  Psychiatry: A & O x 3, Mood & affect appropriate  Gastrointestinal:  soft, mild tenderness, abd dressing in place	  Skin: No rashes, No ecchymoses, No cyanosis	  Neurologic: Non-focal  Extremities: Normal range of motion, No clubbing, cyanosis or edema          LABS:	 	                        11.5   10.85 )-----------( 196      ( 07 Oct 2023 01:08 )             35.4       10-07    137  |  106  |  12  ----------------------------<  157<H>  4.6   |  18<L>  |  0.95    10-06    137  |  106  |  11  ----------------------------<  149<H>  4.4   |  20<L>  |  0.87    Ca    8.4      07 Oct 2023 01:08  Ca    8.8      06 Oct 2023 20:52  Phos  4.0     10-07  Phos  3.8     10-06  Mg     2.30     10-07  Mg     1.90     10-06          CARDIAC MARKERS:  Troponin 218/266/235/195    Creatine Kinase, Serum: 443 U/L (10-07 @ 01:08)  Creatine Kinase, Serum: 368 U/L (10-06 @ 20:52)    TELEMETRY: NSR 1st degree AVHB      ECG:  	NSR with 1st degree AVHB, no evidence of ischemic changes      PREVIOUS DIAGNOSTIC TESTING:    [ ] Echocardiogram:  TRANSTHORACIC ECHOCARDIOGRAM REPORT  ________________________________________________________________________________                                      _______       Pt. Name:       JONNIE ARMSTRONG Study Date:    9/22/2023  MRN:            [id number]      YOB: 1950  Accession #:    [id number]       Age:           73 years  Account#:       [id number]    Gender:        M  Heart Rate:     75 bpm          Height:        65.00 in (165.10 cm)  Rhythm:         sinus rhythm    Weight:       164.00 lb (74.39 kg)  Blood Pressure: 136/83 mmHg     BSA/BMI:       1.82 m² / 27.29 kg/m²  ________________________________________________________________________________________  Referring Physician:    [id number] Steffen Gaitan  Interpreting Physician: Karyn Brown  Primary Sonographer:    Era Beltre SYLVIA    CPT:                ECHO TTE WITH CON COMP W DOPP - .m;DEFINITY ECHO                      CONTRAST PER ML - .m;DEFINITY ECHO CONTRAST PER ML                      WASTED - .m  Indication(s):      Encounter for other preprocedural examination - Z01.818  Procedure:          Transthoracic echocardiogram with 2-D, M-mode and complete                      spectral and color flow Doppler.  Ordering Location:  [facility]  Admission Status:   Outpatient  Contrast Injection: Verbal consent was obtained for injection of Ultrasonic                      Enhancing Agent following a discussion of risks and                      benefits.                      Endocardial visualization enhanced with 3 ml of Definity                      Ultrasound enhancing agent (Lot#:[id number] Exp.Date:09/01/2024                      Discarded Dose:7ml).  UEA Reaction:       Patient had no adverse reaction after injection of   Ultrasound Enhancing Agent.  Study Information:  Image quality for this study is fair.    _______________________________________________________________________________________     CONCLUSIONS:      1. Left ventricular systolic function is mildly decreased with an ejection fraction of 46 % by Rudolph's method of disks. There is global left ventricular hypokinesis. No evidence of a thrombus in the left ventricle.   2. There is mild (grade 1) left ventricular diastolic dysfunction.   3. Right ventricular cavity is normal in size, normal wall thickness and normal systolic function.   4. Normal atria.   5. There is trace mitral regurgitation.   6. There is trace tricuspid regurgitation. Estimated pulmonary artery systolic pressure is 24 mmHg.  7. 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 The ascending aorta diameter is dilated, measuring 3.80 cm (indexed 2.09 cm/m²).   8. No pericardial effusion seen.   9. Compared to the transthoracic echocardiogram performed on 6/19/2015 there have been no significant interval changes.    ________________________________________________________________________________________  FINDINGS:     Left Ventricle:  Normal left ventricular cavity size. Leftventricular systolic function is mildly decreased with a calculated ejection fraction of 46 % by the Rudolph's biplane method of disks. There is global left ventricular hypokinesis. There is mild (grade 1) left ventricular diastolic dysfunction. No left ventricular hypertrophy. There is normal LV mass and normal geometry. There is no evidence of a thrombus in the left ventricle.     Right Ventricle:  The right ventricular cavity is normal in size, normal wall thickness and normal systolic function. Tricuspid annular plane systolic excursion (TAPSE) is 2.8 cm (normal >=1.7 cm). A device lead is visualized in the right heart.     Aortic Valve:  The aortic valve is tricuspid with normal structure without stenosis. There is no evidence of aortic regurgitation.     Mitral Valve:  Structurally normal mitral valve with normal leaflet excursion. There is trace mitral regurgitation.     Tricuspid Valve:  Structurally normal tricuspid valve with normal leaflet excursion. There is trace tricuspid regurgitation. Estimated pulmonary artery systolic pressure is 24 mmHg.     Pulmonic Valve:  Structurally normal pulmonic valve with normal leaflet excursion. There is trace pulmonic regurgitation.     Aorta:  The aortic root at the sinuses of Valsalva is dilated, measuring 3.80 cm (indexed 2.09 cm/m²). The ascending aorta diameter is dilated, measuring 3.80 cm (indexed 2.09 cm/m²).     Pericardium:  No pericardial effusion seen.     Systemic Veins:  The inferior vena cava is normal in size (normal <2.1cm) with normal inspiratory collapse (normal >50%) consistent with normal right atrial pressure (~3, range 0-5mmHg).  ____________________________________________________________________  Quantitative Data:  Left Ventricle Measurements: (Indexed to BSA)     IVSd (2D):   0.6 cm  LVPWd (2D):  0.7 cm  LVIDd (2D):  4.9 cm  LVIDs (2D):  4.3 cm  LV Mass:     106 g  58.4 g/m²  LV Vol d, MOD A2C: 80.3 ml 44.17 ml/m²  LV Vol d, MOD A4C: 67.5 ml 37.13 ml/m²  LV Vol d, MOD BP:  73.2 ml 40.26 ml/m²  LV Vol s, MOD A2C: 38.7 ml 21.29 ml/m²  LV Vol s, MOD A4C: 36.4 ml 20.02 ml/m²  LV Vol s, MOD BP:  39.4 ml 21.66 ml/m²  LVOT SV MOD BP:    33.8 ml  LV EF% MOD BP:     46 %     MV E Vmax:    0.65 m/s  MV A Vmax:    0.76 m/s  MV E/A:       0.86  e' lateral: 9.14 cm/s  e' medial:    7.51 cm/s  E/e' lateral: 7.11  E/e' medial:  8.66  E/e' Average: 7.81  MV DT:        203 msec    Aorta Measurements: (normal range) (Indexed to BSA)     Sinuses of Valsalva: 3.80 cm (3.1 - 3.7 cm)  Ao Asc prox:         3.80 cm       Left Atrium Measurements: (Indexed to BSA)  LA Diam 2D: 3.50 cm    Right Ventricle Measurements:     TAPSE:      2.8 cm  TV Yessica. S': 12.20 cm/s       LVOT / RVOT/ Qp/Qs Data: (Indexed to BSA)  LVOT Diameter: 2.20 cm  LVOT Vmax:     0.68 m/s  LVOT VTI:      15.20 cm  LVOT SV:       57.8 ml  31.78 ml/m²    Mitral Valve Measurements:     MV E Vmax: 0.6 m/s  MV A Vmax: 0.8 m/s  MV E/A:    0.9       Tricuspid Valve Measurements:     TR Vmax:          2.3 m/s  TR Peak Gradient: 20.8 mmHg  RA Pressure:      3 mmHg  PASP:             24 mmHg    ________________________________________________________________________________________  Electronically signed on 9/22/2023 at 3:37:17 PM by Karyn Brown         *** Final ***    [ ] Stress Test:  	  	  	            ADDENDUM TO PRIOR REPORT  Reason for amended report: add prone imaging.     [id number] Stef Han on 9/22/2023 at 2:22:55 PM  ________________________________________________________________________________________  Nuclear Pharmacologic Stress Test Report         Patient: JONNIE ARMSTRONG                 Study Date: 9/22/2023           MRN: [id number]                            Birth 1950 Age: 73 years                                                  Date/Age:      Access #: [id number]                           Gender: M     Order Loc: [facility]/                               Height: 165.1 cm/ 65.0 in  Request Phys: [id number] Steffen Vavasis         Weight: 74.39 kg/ 164.00 lb     Procedure: Rest|Stress SESTAMIBI             BSA/ BMI: 1.82 m²/ 27.29 kg/m²    Indication: Dyspnea, unspecified -       Admiss Status: Outpatient                R06.00    Fellow/ACP: Sidney RECINOS               Fellow/ACP:    ---------------------------------------------------------------------------------------------------------------------------------------------------------  Procedure Code: STRESS ECHO EKG - 88584.m; MYOCARDIAL SPECT MULTIPLE - 87339.m;                  INJECTION REGADENOSON - .m; TC 99M SESTAMIBI PER DOSE -                  .m; TC 99M SESTAMIBI PER DOSE 2nd - .m    ---------------------------------------------------------------------------------------------------------------------------------------------------------  History:       Known coronary artery disease. 74 y/o M with Pmhx of CAD with                 stents, ischemic CM, ICD, HLD, PAfib, BPH, mucinous GI carcinoma                 presents for ischemic evaluation for pre-op clearance for                 abdominal surgery.  Image Quality: Good  Medications:   ASA, Tamsulosin, Vit D, Coumadin, ASA, Coreg, Lisinopril, KCL,                 Zocor.  Allergies:     None    --------------------------------------------------------------------------------------------------------------------------------------------------------Conclusions:   1. Qualitative Perfusion:      - medium-sized, moderate defect(s) in the lateral and inferolateral walls that are fixed consistent with an infarct.   2. The left ventricle is moderately decreased in function and normal in size. The left ventricular EF% during stress is 42 %.   3. Hypokinesis of the inferolateral wall.    ---------------------------------------------------------------------------------------------------------------------------------------------------------     Stress Test Results:  Pretest Chest Pain: None.       Pharmacological Stress Test Results:                Protocol: IV regadenoson (bolus injection, 400mcg over 10 to 20                          seconds followed by 5ml flush)              Heart Rate: Resting 65 bpm, Stress peak 96 bpm and (66 % max                          predicted).          Blood Pressure: Resting 123/78 mmHg and Stress max 90/48 mmHg.  Symptoms During Stress: Stomach bloating.  Reason for Termination: Completion of protocol.       Protocol: Regadenoson Injection  +--------+--------------+----------------+--------------+  |  Time  |              |Heart Rate (bpm)|Blood Pressure|  +--------+--------------+----------------+--------------+  |Baseline|Pre-Injection |       65       |    123/78    |  +--------+--------------+----------------+--------------+  | 01:00  |Post Injection|       71       |    119/95    |  +--------+--------------+----------------+--------------+  | 02:00  |Post Injection|       96       |    90/48     |  +--------+--------------+----------------+--------------+  | 03:00  |Post Injection|       98       |    99/72     |  +--------+--------------+----------------+--------------+  | 04:00  |Post Injection|       93       |    105/69    |  +--------+--------------+----------------+--------------+  | 05:00  |   Recovery   |       94       |    109/74    |  +--------+--------------+----------------+--------------+  | 06:00  |   Recovery   |       86       |    110/70    |  +--------+--------------+----------------+--------------+  | 07:00  |   Recovery   |       86       |    104/76    |  +--------+--------------+----------------+--------------+  | 10:00  |   Recovery   |       96       |    105/69    |  +--------+--------------+----------------+--------------+    ---------------------------------------------------------------------------------------------------------------------------------------------------------Electrocardiogram:  Baseline electrocardiogram: Sinus rhythm with first degree AV block and low QRS  voltage.  Stress electrocardiogram: No arrhythmia associated with stress.    ---------------------------------------------------------------------------------------------------------------------------------------------------------  Procedure:  The patient was given 7.27 mCi of TC-99M Sestamibi intravenously at rest on 9/22/2023 at 12:22:54 PM. Approximately 60 minutes later, solid state SPECT images were obtained, with patient in supine position. 25.06 mCi of TC-99M Sestamibi was given intravenously at stress on 9/22/2023. After 65 min minutes, solid state gated SPECT images were obtained and assessed for myocardial perfusion and function, with patient in supine position. Images were reacquired with the patient in a prone position.  ---------------------------------------------------------------------------------------------------------------------------------------------------------  Perfusion:  Qualitative Findings:  There are medium-sized, moderate defect(s) in the lateral and inferolateral walls that are fixed consistent with an infarct.                   SSS  SRS     Summed Score:  5    5     SSS: 5 SRS: 5 SDS: 0       Quantitative Imaging Findings    TID Stress  1.11  TID greater than 1.2-1.25 is generally considered abnormal.       Left Ventricular Motion: Hypokinesis of the inferolateral wall.  ---------------------------------------------------------------------------------------------------------------------------------------------------------     Ventricular Function: The left ventricle is moderately decreased in function and normal in size. The stress left ventricular EF% is 42 %. The stress end diastolic volume is 91 ml and systolic volume is 53 ml.     Interpreting Provider: Electronically signed on 9/22/2023 at 12:50:34 PM by Stef Han

## 2023-10-07 NOTE — PROGRESS NOTE ADULT - ASSESSMENT
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    Plan:   INCOMPLETE NOTE  73y male patient S/P ex lap w/ omentectomy and repair of serosa for secondary malignant neoplasm of the retroperitoneum. Planned HIPEC and further debulking abandoned due to patient cardiac arrhythmias and need for pressors in OR. Patient doing well today. Overnight, patient had two episodes of 8 beats of V tach seen on tele. Patient was asymptomatic during this period, and was treated by SICU with magnesium.    Plan:  - Pain control PCA  - Cetacaine available for sore throat  - Midline prevena to be removed on POD5  - Diet: NPO  - IVF  - DVT ppx: SCD's & lovenox  - Appreciate remainder of care per SICU

## 2023-10-07 NOTE — PROGRESS NOTE ADULT - ASSESSMENT
73y Male with PMHx of CAD, CHF, stent and ICD, a fib,  malignant neoplasm of retroperitoneum now s/p exploratory laparotomy with omentectomy 10/6    PLAN:   Neurologic:   - AOx4  - Pain is controlled with PCA pump and Tylenol    Respiratory:  - Saturating well on 2L NC    Cardiovascular:   - Hemodynamically stable, OFF pressors    Gastrointestinal/Nutrition:   - NPO     Renal/Genitourinary:   - Monitor UOP  - Trend electrolytes and replete accordingly    Hematologic:   - H&H  - Chemical DVT PPx  - Mechanical DVT PPx    Infectious Disease:   - Trend WBC and active signs of infection    Lines/Tubes:    Endocrine:   - Monitor blood glucose levels     Disposition:   SICU  FULL CODE    Critical Care Diagnoses:   73y Male with PMHx of CAD, CHF, stent and ICD, a fib,  malignant neoplasm of retroperitoneum now s/p exploratory laparotomy with omentectomy 10/6    PLAN:   Neurologic:   - AOx4  - Pain is controlled with PCA pump and Tylenol    Respiratory:  - Saturating well on 2L NC    Cardiovascular:   - Hemodynamically stable, OFF pressors  - On carvedilol at home, has ngt started iv metoprolol 5 q 6 hours    Gastrointestinal/Nutrition:   - NPO  with NGtube    Renal/Genitourinary:   - Monitor UOP  - Trend electrolytes and replete accordingly    Hematologic:   - H&H  - Chemical DVT PPx  - Mechanical DVT PPx    Infectious Disease:   - Trend WBC and active signs of infection    Lines/Tubes:    Endocrine:   - Monitor blood glucose levels     Disposition:   SICU  FULL CODE    Critical Care Diagnoses:

## 2023-10-07 NOTE — PROGRESS NOTE ADULT - ATTENDING COMMENTS
I agree with the detailed interval history, physical, and plan, which I have reviewed and edited where appropriate'; also agree with notes/assessment with my team on service.  I have personally examined the patient.  I was physically present for the key portions of the evaluation and management (E/M) service provided.  I reviewed all the pertinent data.  The patient is a critical care patient with life threatening hemodynamic and metabolic instability in SICU.  The SICU team has a constant risk benefit analyzes discussion and coordinating care with the primary team and all consultants.   The patient is in SICU with the chief complaint and diagnosis mentioned in the note.   The plan will be specified in the note.  73y Male s/p exploratory laparotomy with omentectomy in SICU for HD monitoring-VT in PM.  EXAM:  General/Neuro  Exam: no focal deficits  Respiratory  Exam: Lungs clear   Cardiovascular  Exam: IRRG  GI  Exam: Abdomen soft  PLAN:   Neurologic:   - PCA   -Tylenol  Respiratory:  - 2L NC  Cardiovascular:   - MAP>65  Gastrointestinal/Nutrition:   - NPO   Renal/Genitourinary:   - Trend electrolytes   Hematologic:   -SQH  Infectious Disease:   - Trend WBC   Endocrine:   - Monitor blood glucose   Disposition:   SICU  FULL CODE

## 2023-10-07 NOTE — PATIENT PROFILE ADULT - FALL HARM RISK - HARM RISK INTERVENTIONS

## 2023-10-07 NOTE — CONSULT NOTE ADULT - TIME BILLING
77-year-old man with CAD s/p PCI with stents to mLAD and OM1 2008 and dRCA in 2018 (on ASA no Plavix or Brilinta), ICD ([employer]) for poss CM, HFrEF 46% with mild/mod LV dysfunction, and afib on warfarin. He presented for exploratory lap and omentectomy for retroperitoneal neoplasm. He underwent exlap and omentectomy 10/6/23 and developed ventricular bigeminy and runs of NSVT- asymptomatic. K was stable, Mg was repleted. Also with post-op hypotension which responded to brief trial of Levophed. ECG is without ischemic ST-segment or T-wave changes, however, there is elevation of HSTropT and CK/CKMBs (now trending down).

## 2023-10-08 LAB
ANION GAP SERPL CALC-SCNC: 16 MMOL/L — HIGH (ref 7–14)
B PERT DNA SPEC QL NAA+PROBE: SIGNIFICANT CHANGE UP
B PERT+PARAPERT DNA PNL SPEC NAA+PROBE: SIGNIFICANT CHANGE UP
BORDETELLA PARAPERTUSSIS (RAPRVP): SIGNIFICANT CHANGE UP
BUN SERPL-MCNC: 12 MG/DL — SIGNIFICANT CHANGE UP (ref 7–23)
C PNEUM DNA SPEC QL NAA+PROBE: SIGNIFICANT CHANGE UP
CALCIUM SERPL-MCNC: 8.6 MG/DL — SIGNIFICANT CHANGE UP (ref 8.4–10.5)
CHLORIDE SERPL-SCNC: 100 MMOL/L — SIGNIFICANT CHANGE UP (ref 98–107)
CK MB BLD-MCNC: 0.8 % — SIGNIFICANT CHANGE UP (ref 0–2.5)
CK MB CFR SERPL CALC: 2.8 NG/ML — SIGNIFICANT CHANGE UP
CK SERPL-CCNC: 354 U/L — HIGH (ref 30–200)
CO2 SERPL-SCNC: 22 MMOL/L — SIGNIFICANT CHANGE UP (ref 22–31)
CREAT SERPL-MCNC: 0.9 MG/DL — SIGNIFICANT CHANGE UP (ref 0.5–1.3)
EGFR: 90 ML/MIN/1.73M2 — SIGNIFICANT CHANGE UP
FLUAV SUBTYP SPEC NAA+PROBE: SIGNIFICANT CHANGE UP
FLUBV RNA SPEC QL NAA+PROBE: SIGNIFICANT CHANGE UP
GLUCOSE SERPL-MCNC: 104 MG/DL — HIGH (ref 70–99)
HADV DNA SPEC QL NAA+PROBE: SIGNIFICANT CHANGE UP
HCOV 229E RNA SPEC QL NAA+PROBE: SIGNIFICANT CHANGE UP
HCOV HKU1 RNA SPEC QL NAA+PROBE: SIGNIFICANT CHANGE UP
HCOV NL63 RNA SPEC QL NAA+PROBE: SIGNIFICANT CHANGE UP
HCOV OC43 RNA SPEC QL NAA+PROBE: SIGNIFICANT CHANGE UP
HCT VFR BLD CALC: 32.3 % — LOW (ref 39–50)
HGB BLD-MCNC: 10.7 G/DL — LOW (ref 13–17)
HMPV RNA SPEC QL NAA+PROBE: SIGNIFICANT CHANGE UP
HPIV1 RNA SPEC QL NAA+PROBE: SIGNIFICANT CHANGE UP
HPIV2 RNA SPEC QL NAA+PROBE: SIGNIFICANT CHANGE UP
HPIV3 RNA SPEC QL NAA+PROBE: SIGNIFICANT CHANGE UP
HPIV4 RNA SPEC QL NAA+PROBE: SIGNIFICANT CHANGE UP
M PNEUMO DNA SPEC QL NAA+PROBE: SIGNIFICANT CHANGE UP
MAGNESIUM SERPL-MCNC: 2 MG/DL — SIGNIFICANT CHANGE UP (ref 1.6–2.6)
MCHC RBC-ENTMCNC: 29.5 PG — SIGNIFICANT CHANGE UP (ref 27–34)
MCHC RBC-ENTMCNC: 33.1 GM/DL — SIGNIFICANT CHANGE UP (ref 32–36)
MCV RBC AUTO: 89 FL — SIGNIFICANT CHANGE UP (ref 80–100)
NRBC # BLD: 0 /100 WBCS — SIGNIFICANT CHANGE UP (ref 0–0)
NRBC # FLD: 0 K/UL — SIGNIFICANT CHANGE UP (ref 0–0)
PHOSPHATE SERPL-MCNC: 2.2 MG/DL — LOW (ref 2.5–4.5)
PLATELET # BLD AUTO: 206 K/UL — SIGNIFICANT CHANGE UP (ref 150–400)
POTASSIUM SERPL-MCNC: 4.3 MMOL/L — SIGNIFICANT CHANGE UP (ref 3.5–5.3)
POTASSIUM SERPL-SCNC: 4.3 MMOL/L — SIGNIFICANT CHANGE UP (ref 3.5–5.3)
RAPID RVP RESULT: SIGNIFICANT CHANGE UP
RBC # BLD: 3.63 M/UL — LOW (ref 4.2–5.8)
RBC # FLD: 14.1 % — SIGNIFICANT CHANGE UP (ref 10.3–14.5)
RSV RNA SPEC QL NAA+PROBE: SIGNIFICANT CHANGE UP
RV+EV RNA SPEC QL NAA+PROBE: SIGNIFICANT CHANGE UP
SARS-COV-2 RNA SPEC QL NAA+PROBE: SIGNIFICANT CHANGE UP
SODIUM SERPL-SCNC: 138 MMOL/L — SIGNIFICANT CHANGE UP (ref 135–145)
TROPONIN T, HIGH SENSITIVITY RESULT: 138 NG/L — CRITICAL HIGH
TROPONIN T, HIGH SENSITIVITY RESULT: 142 NG/L — CRITICAL HIGH
TROPONIN T, HIGH SENSITIVITY RESULT: 148 NG/L — CRITICAL HIGH
WBC # BLD: 11.34 K/UL — HIGH (ref 3.8–10.5)
WBC # FLD AUTO: 11.34 K/UL — HIGH (ref 3.8–10.5)

## 2023-10-08 PROCEDURE — 93306 TTE W/DOPPLER COMPLETE: CPT | Mod: 26

## 2023-10-08 PROCEDURE — 99233 SBSQ HOSP IP/OBS HIGH 50: CPT

## 2023-10-08 RX ORDER — POTASSIUM PHOSPHATE, MONOBASIC POTASSIUM PHOSPHATE, DIBASIC 236; 224 MG/ML; MG/ML
15 INJECTION, SOLUTION INTRAVENOUS ONCE
Refills: 0 | Status: COMPLETED | OUTPATIENT
Start: 2023-10-08 | End: 2023-10-08

## 2023-10-08 RX ORDER — SODIUM CHLORIDE 9 MG/ML
1000 INJECTION, SOLUTION INTRAVENOUS
Refills: 0 | Status: DISCONTINUED | OUTPATIENT
Start: 2023-10-08 | End: 2023-10-10

## 2023-10-08 RX ORDER — ACETAMINOPHEN 500 MG
1000 TABLET ORAL EVERY 6 HOURS
Refills: 0 | Status: COMPLETED | OUTPATIENT
Start: 2023-10-08 | End: 2023-10-09

## 2023-10-08 RX ADMIN — Medication 1000 MILLIGRAM(S): at 11:30

## 2023-10-08 RX ADMIN — Medication 5 MILLIGRAM(S): at 05:04

## 2023-10-08 RX ADMIN — HYDROMORPHONE HYDROCHLORIDE 30 MILLILITER(S): 2 INJECTION INTRAMUSCULAR; INTRAVENOUS; SUBCUTANEOUS at 07:13

## 2023-10-08 RX ADMIN — HYDROMORPHONE HYDROCHLORIDE 30 MILLILITER(S): 2 INJECTION INTRAMUSCULAR; INTRAVENOUS; SUBCUTANEOUS at 21:26

## 2023-10-08 RX ADMIN — Medication 1000 MILLIGRAM(S): at 05:35

## 2023-10-08 RX ADMIN — Medication 400 MILLIGRAM(S): at 18:03

## 2023-10-08 RX ADMIN — SODIUM CHLORIDE 40 MILLILITER(S): 9 INJECTION, SOLUTION INTRAVENOUS at 11:01

## 2023-10-08 RX ADMIN — SODIUM CHLORIDE 75 MILLILITER(S): 9 INJECTION INTRAMUSCULAR; INTRAVENOUS; SUBCUTANEOUS at 19:49

## 2023-10-08 RX ADMIN — Medication 1000 MILLIGRAM(S): at 00:45

## 2023-10-08 RX ADMIN — Medication 400 MILLIGRAM(S): at 23:25

## 2023-10-08 RX ADMIN — Medication 5 MILLIGRAM(S): at 23:25

## 2023-10-08 RX ADMIN — HYDROMORPHONE HYDROCHLORIDE 30 MILLILITER(S): 2 INJECTION INTRAMUSCULAR; INTRAVENOUS; SUBCUTANEOUS at 19:49

## 2023-10-08 RX ADMIN — Medication 400 MILLIGRAM(S): at 05:05

## 2023-10-08 RX ADMIN — Medication 5 MILLIGRAM(S): at 11:00

## 2023-10-08 RX ADMIN — CHLORHEXIDINE GLUCONATE 1 APPLICATION(S): 213 SOLUTION TOPICAL at 11:00

## 2023-10-08 RX ADMIN — SODIUM CHLORIDE 40 MILLILITER(S): 9 INJECTION, SOLUTION INTRAVENOUS at 19:49

## 2023-10-08 RX ADMIN — Medication 400 MILLIGRAM(S): at 11:00

## 2023-10-08 RX ADMIN — Medication 5 MILLIGRAM(S): at 00:18

## 2023-10-08 RX ADMIN — HYDROMORPHONE HYDROCHLORIDE 30 MILLILITER(S): 2 INJECTION INTRAMUSCULAR; INTRAVENOUS; SUBCUTANEOUS at 19:18

## 2023-10-08 RX ADMIN — BENZOCAINE AND MENTHOL 1 LOZENGE: 5; 1 LIQUID ORAL at 05:04

## 2023-10-08 RX ADMIN — Medication 400 MILLIGRAM(S): at 00:15

## 2023-10-08 RX ADMIN — Medication 1000 MILLIGRAM(S): at 23:55

## 2023-10-08 RX ADMIN — ENOXAPARIN SODIUM 40 MILLIGRAM(S): 100 INJECTION SUBCUTANEOUS at 16:04

## 2023-10-08 RX ADMIN — Medication 1000 MILLIGRAM(S): at 18:33

## 2023-10-08 RX ADMIN — POTASSIUM PHOSPHATE, MONOBASIC POTASSIUM PHOSPHATE, DIBASIC 62.5 MILLIMOLE(S): 236; 224 INJECTION, SOLUTION INTRAVENOUS at 02:56

## 2023-10-08 RX ADMIN — Medication 5 MILLIGRAM(S): at 18:04

## 2023-10-08 RX ADMIN — SODIUM CHLORIDE 75 MILLILITER(S): 9 INJECTION INTRAMUSCULAR; INTRAVENOUS; SUBCUTANEOUS at 07:13

## 2023-10-08 NOTE — PROGRESS NOTE ADULT - SUBJECTIVE AND OBJECTIVE BOX
Anesthesia Pain Management Service    SUBJECTIVE: Patient is doing well with surgical pain, on IV PCA and no significant problems reported. Patient endorsing primarily pain in the throat and believes he may have an infection there. Patient's daughter requesting viral panel to rule out infection.    Pain Scale Score	At rest: ___ 	With Activity: ___ 	[X ] Refer to charted pain scores    THERAPY:    [ ] IV PCA Morphine		[ ] 5 mg/mL	[ ] 1 mg/mL  [X ] IV PCA Hydromorphone	[ ] 5 mg/mL	[X ] 1 mg/mL  [ ] IV PCA Fentanyl		[ ] 50 micrograms/mL    Demand dose __0.2_ lockout __6_ (minutes) Continuous Rate _0__ Total: _1.4__  mg used (in past 24 hours)      MEDICATIONS  (STANDING):  acetaminophen   IVPB .. 1000 milliGRAM(s) IV Intermittent every 6 hours  chlorhexidine 2% Cloths 1 Application(s) Topical daily  enoxaparin Injectable 40 milliGRAM(s) SubCutaneous every 24 hours  HYDROmorphone PCA (1 mG/mL) 30 milliLiter(s) PCA Continuous PCA Continuous  influenza  Vaccine (HIGH DOSE) 0.7 milliLiter(s) IntraMuscular once  lactated ringers 1000 milliLiter(s) (40 mL/Hr) IV Continuous <Continuous>  metoprolol tartrate Injectable 5 milliGRAM(s) IV Push every 6 hours  sodium chloride 0.9%. 1000 milliLiter(s) (75 mL/Hr) IV Continuous <Continuous>  tetracaine/benzocaine/butamben Spray 1 Spray(s) Topical daily    MEDICATIONS  (PRN):  benzocaine/menthol Lozenge 1 Lozenge Oral every 6 hours PRN Sore Throat  HYDROmorphone PCA (1 mG/mL) Rescue Clinician Bolus 0.5 milliGRAM(s) IV Push every 15 minutes PRN for Pain Scale GREATER THAN 6  naloxone Injectable 0.1 milliGRAM(s) IV Push every 3 minutes PRN For ANY of the following changes in patient status:  A. RR LESS THAN 10 breaths per minute, B. Oxygen saturation LESS THAN 90%, C. Sedation score of 6  ondansetron Injectable 4 milliGRAM(s) IV Push every 6 hours PRN Nausea  sodium chloride 0.9% lock flush 10 milliLiter(s) IV Push every 1 hour PRN Pre/post blood products, medications, blood draw, and to maintain line patency      OBJECTIVE: Patient in bed, NGT, daughter present. Small point of redness on left side of soft palate.    Sedation Score:	[ X] Alert	[ ] Drowsy 	[ ] Arousable	[ ] Asleep	[ ] Unresponsive    Side Effects:	[X ] None	[ ] Nausea	[ ] Vomiting	[ ] Pruritus  		[ ] Other:    Vital Signs Last 24 Hrs  T(C): 36.5 (08 Oct 2023 08:00), Max: 36.9 (08 Oct 2023 00:00)  T(F): 97.7 (08 Oct 2023 08:00), Max: 98.4 (08 Oct 2023 00:00)  HR: 101 (08 Oct 2023 10:00) (85 - 101)  BP: 125/81 (08 Oct 2023 09:00) (111/59 - 134/84)  BP(mean): 96 (08 Oct 2023 09:00) (71 - 99)  RR: 18 (08 Oct 2023 10:00) (12 - 21)  SpO2: 92% (08 Oct 2023 10:00) (92% - 100%)    Parameters below as of 08 Oct 2023 10:00  Patient On (Oxygen Delivery Method): room air        ASSESSMENT/ PLAN    Therapy to  be:	[ X] Continue   [ ] Discontinued   [ ] Change to prn Analgesics    Documentation and Verification of current medications:   [X] Done	[ ] Not done, not elligible    Comments: Continue IV PCA. Discussed with SICU team, RVP panel requested by patient and daughter. Recommend non-opioid adjuvant analgesics to be used when possible and when allowed by primary surgical team.    Progress Note written now but Patient was seen earlier.

## 2023-10-08 NOTE — PROGRESS NOTE ADULT - ASSESSMENT
73 year old male with extensive cardiac hx including HFrEF with AICD (EF 46%), CAD s/p stent 2008 and 2018 on aspirin, Afib on coumadin, dyslipidemia presents for scheduled surgery for malignant neoplasm of RP. S/p ex lap with omentectomy on 10/6, case aborted intraop 2/2 bigeminy. SICU consulted for hemodynamic monitoring    PLAN:   Neurologic:   - AOx4  - Pain is controlled with PCA pump and Tylenol    Respiratory:  - Saturating well on 2L NC    Cardiovascular:   - non sustaining asymptomatic vtach--> trend trops x 24 hours per cardiology  - On carvedilol at home now on iv metoprolol 5mg q6    Gastrointestinal/Nutrition:   - NPO, NGT     Renal/Genitourinary:   - NS 75 cc/hour  - Monitor UOP  - Trend electrolytes and replete accordingly    Hematologic:   - H&H  - Chemical DVT PPx  - Mechanical DVT PPx    Infectious Disease:   - Trend WBC and active signs of infection    Endocrine:   - Monitor blood glucose levels     Disposition:   SICU  FULL CODE    Critical Care Diagnoses:

## 2023-10-08 NOTE — PROGRESS NOTE ADULT - SUBJECTIVE AND OBJECTIVE BOX
SICU Daily Progress Note  =====================================================  Interval/Overnight Events:       - Complains of R sided chest pain overnight, trend trops   - Per D team, plan to dc NGT in am    ALLERGIES:  No Known Allergies      --------------------------------------------------------------------------------------    MEDICATIONS:    Neurologic Medications  acetaminophen   IVPB .. 1000 milliGRAM(s) IV Intermittent every 6 hours  HYDROmorphone PCA (1 mG/mL) 30 milliLiter(s) PCA Continuous PCA Continuous  HYDROmorphone PCA (1 mG/mL) Rescue Clinician Bolus 0.5 milliGRAM(s) IV Push every 15 minutes PRN for Pain Scale GREATER THAN 6  ondansetron Injectable 4 milliGRAM(s) IV Push every 6 hours PRN Nausea    Respiratory Medications    Cardiovascular Medications  metoprolol tartrate Injectable 5 milliGRAM(s) IV Push every 6 hours    Gastrointestinal Medications  sodium chloride 0.9% lock flush 10 milliLiter(s) IV Push every 1 hour PRN Pre/post blood products, medications, blood draw, and to maintain line patency  sodium chloride 0.9%. 1000 milliLiter(s) IV Continuous <Continuous>    Genitourinary Medications    Hematologic/Oncologic Medications  enoxaparin Injectable 40 milliGRAM(s) SubCutaneous every 24 hours  influenza  Vaccine (HIGH DOSE) 0.7 milliLiter(s) IntraMuscular once    Antimicrobial/Immunologic Medications    Endocrine/Metabolic Medications    Topical/Other Medications  benzocaine/menthol Lozenge 1 Lozenge Oral every 6 hours PRN Sore Throat  chlorhexidine 2% Cloths 1 Application(s) Topical daily  chlorhexidine 4% Liquid 1 Application(s) Topical <User Schedule>  naloxone Injectable 0.1 milliGRAM(s) IV Push every 3 minutes PRN For ANY of the following changes in patient status:  A. RR LESS THAN 10 breaths per minute, B. Oxygen saturation LESS THAN 90%, C. Sedation score of 6  tetracaine/benzocaine/butamben Spray 1 Spray(s) Topical daily    --------------------------------------------------------------------------------------    VITAL SIGNS:  T(C): 36.4 (10-07-23 @ 20:00), Max: 36.7 (10-07-23 @ 12:00)  HR: 98 (10-07-23 @ 23:00) (85 - 115)  BP: 134/84 (10-07-23 @ 14:23) (86/52 - 134/84)  RR: 15 (10-07-23 @ 23:00) (12 - 21)  SpO2: 94% (10-07-23 @ 23:00) (93% - 100%)  --------------------------------------------------------------------------------------    INS AND OUTS:    10-06-23 @ 07:01  -  10-07-23 @ 07:00  --------------------------------------------------------  IN: 1980 mL / OUT: 1240 mL / NET: 740 mL    10-07-23 @ 07:01  -  10-08-23 @ 00:19  --------------------------------------------------------  IN: 1375 mL / OUT: 3210 mL / NET: -1835 mL      --------------------------------------------------------------------------------------    EXAM    NEURO: NAD,   SAVANNAH: NC/AT  RESPIRATORY: nonlabored respirations, normal CW expansion  CARDIO: RRR, S1S2  ABDOMEN: soft, nontender, nondistended, midline prevena to suction  EXTREMITIES: normal strength, no deformities    --------------------------------------------------------------------------------------    LABS                        11.5   10.85 )-----------( 196      ( 07 Oct 2023 01:08 )             35.4     10-07    137  |  106  |  12  ----------------------------<  157<H>  4.6   |  18<L>  |  0.95    Ca    8.4      07 Oct 2023 01:08  Phos  4.0     10-07  Mg     2.30     10-07      --------------------------------------------------------------------------------------

## 2023-10-08 NOTE — PROGRESS NOTE ADULT - SUBJECTIVE AND OBJECTIVE BOX
Surgery Progress Note    S: Patient seen and examined. No acute events overnight. Reports tolerating diet without nausea, vomiting, passing flatus, having bowel movements, voiding without issues, have been ambulating and out of bed. Denies fever, chills, SOB, chest pain.     O:  Physical Exam:  Gen: Laying in bed, NAD  HEENT: atrumatic, EMOI  Resp: Unlabored breathing  Abd: soft, carlito-incisional tenderness, nondistended, no rebound or guarding. Drains serosanguinous   Ext: Moves 4 extremities spontaneously    Vital Signs Last 24 Hrs  T(C): 36.6 (08 Oct 2023 04:00), Max: 36.9 (08 Oct 2023 00:00)  T(F): 97.9 (08 Oct 2023 04:00), Max: 98.4 (08 Oct 2023 00:00)  HR: 96 (08 Oct 2023 04:00) (85 - 115)  BP: 134/84 (07 Oct 2023 14:23) (99/62 - 134/84)  BP(mean): 99 (07 Oct 2023 14:23) (65 - 99)  RR: 13 (08 Oct 2023 04:00) (12 - 21)  SpO2: 95% (08 Oct 2023 04:00) (92% - 100%)    Parameters below as of 08 Oct 2023 04:00  Patient On (Oxygen Delivery Method): room air        I&O's Detail    06 Oct 2023 07:01  -  07 Oct 2023 07:00  --------------------------------------------------------  IN:    IV PiggyBack: 750 mL    sodium chloride 0.9%: 1230 mL  Total IN: 1980 mL    OUT:    Indwelling Catheter - Urethral (mL): 1090 mL    Nasogastric/Oral tube (mL): 150 mL  Total OUT: 1240 mL    Total NET: 740 mL      07 Oct 2023 07:01  -  08 Oct 2023 04:35  --------------------------------------------------------  IN:    IV PiggyBack: 350 mL    sodium chloride 0.9%: 1650 mL  Total IN: 2000 mL    OUT:    Indwelling Catheter - Urethral (mL): 3660 mL    Nasogastric/Oral tube (mL): 200 mL  Total OUT: 3860 mL    Total NET: -1860 mL                                10.7   11.34 )-----------( 206      ( 08 Oct 2023 00:39 )             32.3       10-08    138  |  100  |  12  ----------------------------<  104<H>  4.3   |  22  |  0.90    Ca    8.6      08 Oct 2023 00:39  Phos  2.2     10-08  Mg     2.00     10-08         Surgery Progress Note    S: Patient seen and examined. No acute events overnight. He is still NPO and has not passed gas or had bowel movements yet, complaining of throat soreness. He does not complain of nausea or vomiting and is out of bed to chair.    O:  Physical Exam:  Gen: Laying in bed, NAD  HEENT: atrumatic, EMOI  Resp: Unlabored breathing  Abd: soft, carlito-incisional tenderness, nondistended, no rebound or guarding.  Ext: Moves 4 extremities spontaneously    Vital Signs Last 24 Hrs  T(C): 36.6 (08 Oct 2023 04:00), Max: 36.9 (08 Oct 2023 00:00)  T(F): 97.9 (08 Oct 2023 04:00), Max: 98.4 (08 Oct 2023 00:00)  HR: 96 (08 Oct 2023 04:00) (85 - 115)  BP: 134/84 (07 Oct 2023 14:23) (99/62 - 134/84)  BP(mean): 99 (07 Oct 2023 14:23) (65 - 99)  RR: 13 (08 Oct 2023 04:00) (12 - 21)  SpO2: 95% (08 Oct 2023 04:00) (92% - 100%)    Parameters below as of 08 Oct 2023 04:00  Patient On (Oxygen Delivery Method): room air        I&O's Detail    06 Oct 2023 07:01  -  07 Oct 2023 07:00  --------------------------------------------------------  IN:    IV PiggyBack: 750 mL    sodium chloride 0.9%: 1230 mL  Total IN: 1980 mL    OUT:    Indwelling Catheter - Urethral (mL): 1090 mL    Nasogastric/Oral tube (mL): 150 mL  Total OUT: 1240 mL    Total NET: 740 mL      07 Oct 2023 07:01  -  08 Oct 2023 04:35  --------------------------------------------------------  IN:    IV PiggyBack: 350 mL    sodium chloride 0.9%: 1650 mL  Total IN: 2000 mL    OUT:    Indwelling Catheter - Urethral (mL): 3660 mL    Nasogastric/Oral tube (mL): 200 mL  Total OUT: 3860 mL    Total NET: -1860 mL                                10.7   11.34 )-----------( 206      ( 08 Oct 2023 00:39 )             32.3       10-08    138  |  100  |  12  ----------------------------<  104<H>  4.3   |  22  |  0.90    Ca    8.6      08 Oct 2023 00:39  Phos  2.2     10-08  Mg     2.00     10-08

## 2023-10-08 NOTE — CHART NOTE - NSCHARTNOTEFT_GEN_A_CORE
Patient will be downgraded from SICU to 823T, signed out to D team resident Ruth. SUZANNE dc this morning. Patient passed TOV. Plan for AICD interrogation with EP    SICU [id number]

## 2023-10-08 NOTE — PROGRESS NOTE ADULT - ASSESSMENT
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 Planned HIPEC and further debulking abandoned due to patient cardiac arrhythmias and need for pressors in OR. Patient doing well today. Overnight, patient had two episodes of 8 beats of V tach seen on tele. Patient was asymptomatic during this period, and was treated by SICU with magnesium.    Plan:  - NGT out today  - Pain control PCA  - Cetacaine available for sore throat  - Midline prevena to be removed on POD5  - Diet: NPO  - IVF  - DVT ppx: SCD's & lovenox  - Appreciate remainder of care per SICU    D team surgery  [id number] 73y male patient S/P ex lap w/ omentectomy and repair of serosa for secondary malignant neoplasm of the retroperitoneum. Planned HIPEC and further debulking abandoned due to patient cardiac arrhythmias and need for pressors in OR. Patient doing well today. Overnight, patient had two episodes of 8 beats of V tach seen on tele. Patient was asymptomatic during this period, and was treated by SICU with magnesium.    Plan:  - NGT, smith out today  - List to floor  - Pain control PCA  - Cetacaine available for sore throat  - Midline prevena to be removed on POD5  - Diet: NPO until passes gas  - IVF  - DVT ppx: SCD's & lovenox  - Appreciate remainder of care per SICU    D team surgery  [id number]

## 2023-10-08 NOTE — PROGRESS NOTE ADULT - ATTENDING COMMENTS
I agree with the detailed interval history, physical, and plan, which I have reviewed and edited where appropriate'; also agree with notes/assessment with my team on service.  I have personally examined the patient.  I was physically present for the key portions of the evaluation and management (E/M) service provided.  I reviewed all the pertinent data.  The patient is a critical care patient with life threatening hemodynamic and metabolic instability in SICU.  The SICU team has a constant risk benefit analyzes discussion and coordinating care with the primary team and all consultants.   The patient is in SICU with the chief complaint and diagnosis mentioned in the note.   The plan will be specified in the note.  73y Male s/p exploratory laparotomy with omentectomy in SICU for HD monitoring-VT in PM.  EXAM:  General/Neuro  Exam: no focal deficits  Respiratory  Exam: Lungs clear   Cardiovascular  Exam: IRRG  GI  Exam: Abdomen soft  PLAN:   Neurologic:   - DC PCA   -Tylenol  -OOB  Respiratory:  - 2L NC  Cardiovascular:   - MAP>65  Gastrointestinal/Nutrition:   - NPO   Renal/Genitourinary:   - Trend electrolytes   Hematologic:   -SQH  Infectious Disease:   - Trend WBC   Endocrine:   - Monitor blood glucose   Disposition:   SICU  FULL CODE

## 2023-10-09 LAB
ANION GAP SERPL CALC-SCNC: 15 MMOL/L — HIGH (ref 7–14)
BUN SERPL-MCNC: 13 MG/DL — SIGNIFICANT CHANGE UP (ref 7–23)
CALCIUM SERPL-MCNC: 8.5 MG/DL — SIGNIFICANT CHANGE UP (ref 8.4–10.5)
CHLORIDE SERPL-SCNC: 101 MMOL/L — SIGNIFICANT CHANGE UP (ref 98–107)
CK SERPL-CCNC: 209 U/L — HIGH (ref 30–200)
CO2 SERPL-SCNC: 20 MMOL/L — LOW (ref 22–31)
CREAT SERPL-MCNC: 0.78 MG/DL — SIGNIFICANT CHANGE UP (ref 0.5–1.3)
EGFR: 94 ML/MIN/1.73M2 — SIGNIFICANT CHANGE UP
GLUCOSE SERPL-MCNC: 77 MG/DL — SIGNIFICANT CHANGE UP (ref 70–99)
HCT VFR BLD CALC: 30.2 % — LOW (ref 39–50)
HGB BLD-MCNC: 9.8 G/DL — LOW (ref 13–17)
MAGNESIUM SERPL-MCNC: 1.8 MG/DL — SIGNIFICANT CHANGE UP (ref 1.6–2.6)
MCHC RBC-ENTMCNC: 29.4 PG — SIGNIFICANT CHANGE UP (ref 27–34)
MCHC RBC-ENTMCNC: 32.5 GM/DL — SIGNIFICANT CHANGE UP (ref 32–36)
MCV RBC AUTO: 90.7 FL — SIGNIFICANT CHANGE UP (ref 80–100)
NRBC # BLD: 0 /100 WBCS — SIGNIFICANT CHANGE UP (ref 0–0)
NRBC # FLD: 0 K/UL — SIGNIFICANT CHANGE UP (ref 0–0)
PHOSPHATE SERPL-MCNC: 2.1 MG/DL — LOW (ref 2.5–4.5)
PLATELET # BLD AUTO: 180 K/UL — SIGNIFICANT CHANGE UP (ref 150–400)
POTASSIUM SERPL-MCNC: 4 MMOL/L — SIGNIFICANT CHANGE UP (ref 3.5–5.3)
POTASSIUM SERPL-SCNC: 4 MMOL/L — SIGNIFICANT CHANGE UP (ref 3.5–5.3)
RBC # BLD: 3.33 M/UL — LOW (ref 4.2–5.8)
RBC # FLD: 14.5 % — SIGNIFICANT CHANGE UP (ref 10.3–14.5)
S PYO DNA THROAT QL NAA+PROBE: SIGNIFICANT CHANGE UP
SODIUM SERPL-SCNC: 136 MMOL/L — SIGNIFICANT CHANGE UP (ref 135–145)
TROPONIN T, HIGH SENSITIVITY RESULT: 137 NG/L — CRITICAL HIGH
WBC # BLD: 10.42 K/UL — SIGNIFICANT CHANGE UP (ref 3.8–10.5)
WBC # FLD AUTO: 10.42 K/UL — SIGNIFICANT CHANGE UP (ref 3.8–10.5)

## 2023-10-09 PROCEDURE — 93281 PM DEVICE PROGR EVAL MULTI: CPT | Mod: 26

## 2023-10-09 PROCEDURE — 71045 X-RAY EXAM CHEST 1 VIEW: CPT | Mod: 26

## 2023-10-09 PROCEDURE — 99232 SBSQ HOSP IP/OBS MODERATE 35: CPT | Mod: GC

## 2023-10-09 RX ORDER — LIDOCAINE 4 G/100G
1 CREAM TOPICAL EVERY 24 HOURS
Refills: 0 | Status: COMPLETED | OUTPATIENT
Start: 2023-10-09 | End: 2023-10-13

## 2023-10-09 RX ORDER — MAGNESIUM SULFATE 500 MG/ML
2 VIAL (ML) INJECTION ONCE
Refills: 0 | Status: COMPLETED | OUTPATIENT
Start: 2023-10-09 | End: 2023-10-09

## 2023-10-09 RX ORDER — SODIUM CHLORIDE 9 MG/ML
1000 INJECTION, SOLUTION INTRAVENOUS
Refills: 0 | Status: DISCONTINUED | OUTPATIENT
Start: 2023-10-09 | End: 2023-10-10

## 2023-10-09 RX ORDER — HYDROMORPHONE HYDROCHLORIDE 2 MG/ML
30 INJECTION INTRAMUSCULAR; INTRAVENOUS; SUBCUTANEOUS
Refills: 0 | Status: DISCONTINUED | OUTPATIENT
Start: 2023-10-09 | End: 2023-10-11

## 2023-10-09 RX ORDER — METOPROLOL TARTRATE 50 MG
5 TABLET ORAL EVERY 6 HOURS
Refills: 0 | Status: DISCONTINUED | OUTPATIENT
Start: 2023-10-09 | End: 2023-10-11

## 2023-10-09 RX ADMIN — SODIUM CHLORIDE 84 MILLILITER(S): 9 INJECTION, SOLUTION INTRAVENOUS at 16:00

## 2023-10-09 RX ADMIN — LIDOCAINE 1 PATCH: 4 CREAM TOPICAL at 22:56

## 2023-10-09 RX ADMIN — LIDOCAINE 1 PATCH: 4 CREAM TOPICAL at 11:36

## 2023-10-09 RX ADMIN — Medication 63.75 MILLIMOLE(S): at 11:58

## 2023-10-09 RX ADMIN — LIDOCAINE 1 PATCH: 4 CREAM TOPICAL at 22:55

## 2023-10-09 RX ADMIN — HYDROMORPHONE HYDROCHLORIDE 30 MILLILITER(S): 2 INJECTION INTRAMUSCULAR; INTRAVENOUS; SUBCUTANEOUS at 11:59

## 2023-10-09 RX ADMIN — Medication 400 MILLIGRAM(S): at 17:23

## 2023-10-09 RX ADMIN — ENOXAPARIN SODIUM 40 MILLIGRAM(S): 100 INJECTION SUBCUTANEOUS at 17:24

## 2023-10-09 RX ADMIN — Medication 25 GRAM(S): at 11:36

## 2023-10-09 RX ADMIN — HYDROMORPHONE HYDROCHLORIDE 30 MILLILITER(S): 2 INJECTION INTRAMUSCULAR; INTRAVENOUS; SUBCUTANEOUS at 07:21

## 2023-10-09 RX ADMIN — LIDOCAINE 1 PATCH: 4 CREAM TOPICAL at 11:37

## 2023-10-09 RX ADMIN — Medication 1000 MILLIGRAM(S): at 12:01

## 2023-10-09 RX ADMIN — HYDROMORPHONE HYDROCHLORIDE 30 MILLILITER(S): 2 INJECTION INTRAMUSCULAR; INTRAVENOUS; SUBCUTANEOUS at 19:09

## 2023-10-09 RX ADMIN — SODIUM CHLORIDE 84 MILLILITER(S): 9 INJECTION, SOLUTION INTRAVENOUS at 10:30

## 2023-10-09 RX ADMIN — Medication 5 MILLIGRAM(S): at 17:24

## 2023-10-09 RX ADMIN — Medication 400 MILLIGRAM(S): at 05:00

## 2023-10-09 RX ADMIN — Medication 5 MILLIGRAM(S): at 11:36

## 2023-10-09 RX ADMIN — Medication 5 MILLIGRAM(S): at 05:00

## 2023-10-09 RX ADMIN — Medication 400 MILLIGRAM(S): at 11:36

## 2023-10-09 RX ADMIN — Medication 1000 MILLIGRAM(S): at 18:18

## 2023-10-09 NOTE — PHYSICAL THERAPY INITIAL EVALUATION ADULT - GENERAL OBSERVATIONS, REHAB EVAL
Upon entry, pt seated in recliner in NAD; + telemetry, + PCA pump, + permavac. HR 93 bpm SpO2 98% on RA. Wife present at bedside. Pt left semi-supine in bed with all tubes/lines intact, bed alarm on, call bell in reach and in NAD.

## 2023-10-09 NOTE — PHYSICAL THERAPY INITIAL EVALUATION ADULT - IMPAIRED TRANSFERS: SIT/STAND, REHAB EVAL
impaired balance/impaired coordination/decreased flexibility/impaired motor control/impaired postural control

## 2023-10-09 NOTE — PROGRESS NOTE ADULT - SUBJECTIVE AND OBJECTIVE BOX
Anesthesia Pain Management Service    SUBJECTIVE: Patient is doing well with IV PCA and no significant problems reported.    Pain Scale Score	At rest: _7__ 	With Activity: _11__ 	[X ] Refer to charted pain scores    THERAPY:    [ ] IV PCA Morphine		[ ] 5 mg/mL	[ ] 1 mg/mL  [X ] IV PCA Hydromorphone	[ ] 5 mg/mL	[X ] 1 mg/mL  [ ] IV PCA Fentanyl		[ ] 50 micrograms/mL    Demand dose __0.2_ lockout __6_ (minutes) Continuous Rate _0__ Total: _2.8__  mg used (in past 24 hours)      MEDICATIONS  (STANDING):  acetaminophen   IVPB .. 1000 milliGRAM(s) IV Intermittent every 6 hours  dextrose 5% + sodium chloride 0.9%. 1000 milliLiter(s) (84 mL/Hr) IV Continuous <Continuous>  enoxaparin Injectable 40 milliGRAM(s) SubCutaneous every 24 hours  HYDROmorphone PCA (1 mG/mL) 30 milliLiter(s) PCA Continuous PCA Continuous  influenza  Vaccine (HIGH DOSE) 0.7 milliLiter(s) IntraMuscular once  lactated ringers 1000 milliLiter(s) (84 mL/Hr) IV Continuous <Continuous>  lidocaine   4% Patch 1 Patch Transdermal every 24 hours  lidocaine   4% Patch 1 Patch Transdermal every 24 hours  metoprolol tartrate Injectable 5 milliGRAM(s) IV Push every 6 hours    MEDICATIONS  (PRN):  HYDROmorphone PCA (1 mG/mL) Rescue Clinician Bolus 0.5 milliGRAM(s) IV Push every 15 minutes PRN for Pain Scale GREATER THAN 6  naloxone Injectable 0.1 milliGRAM(s) IV Push every 3 minutes PRN For ANY of the following changes in patient status:  A. RR LESS THAN 10 breaths per minute, B. Oxygen saturation LESS THAN 90%, C. Sedation score of 6  ondansetron Injectable 4 milliGRAM(s) IV Push every 6 hours PRN Nausea  sodium chloride 0.9% lock flush 10 milliLiter(s) IV Push every 1 hour PRN Pre/post blood products, medications, blood draw, and to maintain line patency      OBJECTIVE:  Pt sitting in armchair.    Sedation Score:	[ X] Alert	[ ] Drowsy 	[ ] Arousable	[ ] Asleep	[ ] Unresponsive    Side Effects:	[X ] None	[ ] Nausea	[ ] Vomiting	[ ] Pruritus  		[ ] Other:    Vital Signs Last 24 Hrs  T(C): 36.4 (09 Oct 2023 11:35), Max: 37 (09 Oct 2023 08:30)  T(F): 97.5 (09 Oct 2023 11:35), Max: 98.6 (09 Oct 2023 08:30)  HR: 108 (09 Oct 2023 11:35) (89 - 109)  BP: 104/69 (09 Oct 2023 11:35) (96/59 - 128/69)  BP(mean): 90 (08 Oct 2023 15:00) (86 - 96)  RR: 16 (09 Oct 2023 11:35) (13 - 21)  SpO2: 97% (09 Oct 2023 11:35) (94% - 98%)    Parameters below as of 09 Oct 2023 11:35  Patient On (Oxygen Delivery Method): room air        ASSESSMENT/ PLAN    Therapy to  be:	[ X] Continue   [ ] Discontinued   [ ] Change to prn Analgesics    Documentation and Verification of current medications:   [X] Done	[ ] Not done, not elligible    Comments: Continue IV PCA. Recommend lidocaine patches for either side of wound vac site. Recommend non-opioid adjuvant analgesics to be used when possible and when allowed by primary surgical team.    Progress Note written now but Patient was seen earlier.

## 2023-10-09 NOTE — PROGRESS NOTE ADULT - SUBJECTIVE AND OBJECTIVE BOX
D TEAM Surgery Progress Note  Patient is a 73y old  Male who presents with a chief complaint of exlap and omentectomy for retroperitoneal neoplasm (07 Oct 2023 14:08)    INTERVAL EVENTS: Patient is POD# 3 s/p exploratory laparotomy with omentectomy. No acute events overnight.    SUBJECTIVE: Patient seen and examined at bedside with surgical team, patient without complaints. Abdominal pain is tolerable. Denies nausea, vomiting. Denies passing flatus nor BM. Denies Denies fever, chills, CP, SOB.    OBJECTIVE:    Vital Signs Last 24 Hrs  T(C): 36.7 (09 Oct 2023 04:41), Max: 36.8 (08 Oct 2023 16:00)  T(F): 98 (09 Oct 2023 04:41), Max: 98.3 (08 Oct 2023 16:00)  HR: 99 (09 Oct 2023 04:55) (89 - 109)  BP: 126/74 (09 Oct 2023 04:55) (96/59 - 128/75)  BP(mean): 90 (08 Oct 2023 15:00) (86 - 96)  RR: 17 (09 Oct 2023 04:41) (13 - 21)  SpO2: 98% (09 Oct 2023 04:41) (92% - 98%)    Parameters below as of 09 Oct 2023 04:41  Patient On (Oxygen Delivery Method): room air    I&O's Detail    08 Oct 2023 07:01  -  09 Oct 2023 07:00  --------------------------------------------------------  IN:    IV PiggyBack: 100 mL    Lactated Ringers w/ Additives: 800 mL    sodium chloride 0.9%: 1725 mL  Total IN: 2625 mL    OUT:    Indwelling Catheter - Urethral (mL): 520 mL    Nasogastric/Oral tube (mL): 25 mL    Voided (mL): 1050 mL  Total OUT: 1595 mL    Total NET: 1030 mL      MEDICATIONS  (STANDING):  acetaminophen   IVPB .. 1000 milliGRAM(s) IV Intermittent every 6 hours  chlorhexidine 2% Cloths 1 Application(s) Topical daily  enoxaparin Injectable 40 milliGRAM(s) SubCutaneous every 24 hours  HYDROmorphone PCA (1 mG/mL) 30 milliLiter(s) PCA Continuous PCA Continuous  influenza  Vaccine (HIGH DOSE) 0.7 milliLiter(s) IntraMuscular once  lactated ringers 1000 milliLiter(s) (40 mL/Hr) IV Continuous <Continuous>  metoprolol tartrate Injectable 5 milliGRAM(s) IV Push every 6 hours  sodium chloride 0.9%. 1000 milliLiter(s) (75 mL/Hr) IV Continuous <Continuous>    MEDICATIONS  (PRN):  HYDROmorphone PCA (1 mG/mL) Rescue Clinician Bolus 0.5 milliGRAM(s) IV Push every 15 minutes PRN for Pain Scale GREATER THAN 6  naloxone Injectable 0.1 milliGRAM(s) IV Push every 3 minutes PRN For ANY of the following changes in patient status:  A. RR LESS THAN 10 breaths per minute, B. Oxygen saturation LESS THAN 90%, C. Sedation score of 6  ondansetron Injectable 4 milliGRAM(s) IV Push every 6 hours PRN Nausea  sodium chloride 0.9% lock flush 10 milliLiter(s) IV Push every 1 hour PRN Pre/post blood products, medications, blood draw, and to maintain line patency      PHYSICAL EXAM:  Constitutional: A&Ox3, NAD  Respiratory: Unlabored breathing  Abdomen: Soft, nondistended, +TTP left abdomen. No rebound or guarding. Midline incision with prevena intact.  Extremities: WWP, BLACKMAN spontaneously    LABS:                        9.8    10.42 )-----------( 180      ( 09 Oct 2023 06:42 )             30.2     10-09    136  |  101  |  13  ----------------------------<  77  4.0   |  20<L>  |  0.78    Ca    8.5      09 Oct 2023 06:42  Phos  2.1     10-09  Mg     1.80     10-09          Urinalysis Basic - ( 09 Oct 2023 06:42 )    Color: x / Appearance: x / SG: x / pH: x  Gluc: 77 mg/dL / Ketone: x  / Bili: x / Urobili: x   Blood: x / Protein: x / Nitrite: x   Leuk Esterase: x / RBC: x / WBC x   Sq Epi: x / Non Sq Epi: x / Bacteria: x

## 2023-10-09 NOTE — PROGRESS NOTE ADULT - ASSESSMENT
74 yo man with LV systolic dysfunction s/p ICD (EF 45-50%), frequent PVCs, CAD s/p DESx3, and atrial fibrillation on AC admitted for ex lap and omenectomy for peritoneal neoplasm. Now POD3. Cardiology consulted for frequent PVCs and troponin elevation.     TTE from 9/22 abd 10/8 without any significant change.   Elevated troponin as previously noted is likely in the setting of post op hypotension for which the patient initially required pressors. Please stop trending troponin.     Telemetry notable for frequent PVCs and bigeminy. Would recommend restarting home carvedilol once patient is able to tolerate po in order to decrease PVC burden. In the interim, you can continue with IVP of metoprolol at current dose as he is asymptomatic from PVC burden and he has intermittent pressure recordings on the lower side. Increasing the dose may put the patient at risk for hypotension.     Please see attending attestation for final recommendations.     Thomas Patel MD  Cardiology Fellow

## 2023-10-09 NOTE — PHYSICAL THERAPY INITIAL EVALUATION ADULT - PERTINENT HX OF CURRENT PROBLEM, REHAB EVAL
Pt is a 73 year old male presenting for scheduled surgery for neoplasm of the retroperitoneum. Surgery c/b cardiac arrhythmias and need for pressors in OR.

## 2023-10-09 NOTE — PROGRESS NOTE ADULT - SUBJECTIVE AND OBJECTIVE BOX
Patient seen and examined at bedside.    Overnight Events:   NAEO   s/p ex lap and omenectomy   Denies chest pain, nausea, vomiting, SOB, and palpitations. no dizziness.   Currently NPO because he has not had flatus or a BM yet. Not recieving po medications     REVIEW OF SYSTEMS:  as above     Current Meds:  dextrose 5% + sodium chloride 0.9%. 1000 milliLiter(s) IV Continuous <Continuous>  enoxaparin Injectable 40 milliGRAM(s) SubCutaneous every 24 hours  HYDROmorphone PCA (1 mG/mL) 30 milliLiter(s) PCA Continuous PCA Continuous  HYDROmorphone PCA (1 mG/mL) Rescue Clinician Bolus 0.5 milliGRAM(s) IV Push every 15 minutes PRN  influenza  Vaccine (HIGH DOSE) 0.7 milliLiter(s) IntraMuscular once  lactated ringers 1000 milliLiter(s) IV Continuous <Continuous>  lidocaine   4% Patch 1 Patch Transdermal every 24 hours  lidocaine   4% Patch 1 Patch Transdermal every 24 hours  metoprolol tartrate Injectable 5 milliGRAM(s) IV Push every 6 hours  naloxone Injectable 0.1 milliGRAM(s) IV Push every 3 minutes PRN  ondansetron Injectable 4 milliGRAM(s) IV Push every 6 hours PRN  sodium chloride 0.9% lock flush 10 milliLiter(s) IV Push every 1 hour PRN      Vitals:  T(F): 98.1 (10-09), Max: 98.6 (10-09)  HR: 98 (10-09) (95 - 108)  BP: 111/65 (10-09) (96/59 - 128/69)  RR: 16 (10-09)  SpO2: 95% (10-09)  I&O's Summary    08 Oct 2023 07:01  -  09 Oct 2023 07:00  --------------------------------------------------------  IN: 2625 mL / OUT: 1595 mL / NET: 1030 mL    09 Oct 2023 07:01  -  09 Oct 2023 18:11  --------------------------------------------------------  IN: 619 mL / OUT: 400 mL / NET: 219 mL        Physical Exam:  Appearance: No acute distress; well appearing  Cardiovascular: RRR with extra beats occasionally heard   Respiratory: Clear to auscultation bilaterally  Musculoskeletal: No clubbing; no joint deformity   Neurologic: Non-focal  Lymphatic: No lymphadenopathy  Psychiatry: AAOx3, mood & affect appropriate  Skin: No rashes, ecchymoses, or cyanosis                          9.8    10.42 )-----------( 180      ( 09 Oct 2023 06:42 )             30.2     10-09    136  |  101  |  13  ----------------------------<  77  4.0   |  20<L>  |  0.78    Ca    8.5      09 Oct 2023 06:42  Phos  2.1     10-09  Mg     1.80     10-09        CARDIAC MARKERS ( 09 Oct 2023 06:42 )  x     / x     / 209 U/L / x     / x      CARDIAC MARKERS ( 08 Oct 2023 15:52 )  x     / x     / 354 U/L / x     / 2.8 ng/mL          TTE 10/8  CONCLUSIONS:   1. Technically difficult image quality.   2. Left ventricular cavity is normally sized. Left ventricular wall thickness is normal. Left ventricular systolic function is mildly decreased with an ejection fraction visually estimated at 45 to 50 %. Global left ventricular hypokinesis.   3. The right ventricle is not well visualized.   4. Device lead is visualized in the right heart.   5. Trace mitral regurgitation.

## 2023-10-09 NOTE — PHYSICAL THERAPY INITIAL EVALUATION ADULT - PLANNED THERAPY INTERVENTIONS, PT EVAL
bed mobility training/gait training/neuromuscular re-education/postural re-education/transfer training

## 2023-10-09 NOTE — PROCEDURE NOTE - ADDITIONAL PROCEDURE DETAILS
One episodes of NSVT at 182 bpm lasted 16 beats recorded on 10/6,   post op day #3 One episodes of NSVT at 182 bpm lasted 16 beats recorded on 10/6, no sustained VT or VF event recorded

## 2023-10-09 NOTE — PROGRESS NOTE ADULT - ASSESSMENT
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 Planned HIPEC and further debulking abandoned due to patient cardiac arrhythmias and need for pressors in OR. Course c/b ventricular arrhythmia.    PLAN:  - Pain control: Tylenol ATC, Dilaudid PCA  - Lopressor 5mg q6h IVP while NPO  - Midline prevena to be removed on POD5  - Diet: NPO until passes gas  - IVF  - DVT ppx: SCD's & lovenox    D Team Surgery  [id number]

## 2023-10-09 NOTE — PROGRESS NOTE ADULT - ATTENDING COMMENTS
The patient was seen and examined with the Cardiology Consultation Teaching Service.     No overnight events    No chest pain  No dyspnea, orthopnea or PND  No palpitations or dizziness     Remains NPO    PMH/PSH:  Coronary artery disease  Percutaneous coronary intervention to the LAD and OM in 2008, RCA in 2018  Chronic heart failure with reduced LV function, LVEF 46%  ICD implantation  Atrial fibrillation    Comfortable-appearing man in no acute distress  Alert and oriented  Afebrile  Vital signs stable  JVP is not elevated  Clear lungs  Normal heart sounds  Extremities are warm and perfused  No peripheral edema     Normocytic anemia Hb 9.8  GFR 94    hs-troponin peak 266  CK-MB peak 19.4    Echocardiography demonstrates mildly reduced LV systolic function, LVEF 45-50-%, without significant valve disease; this is similar or better than prior studies    Impression and Recommendations:   77-year-old man with coronary artery disease, prior PCI, chronic heart failure with reduced LV function with ICD implantation, and atrial fibrillation who is now s/p exploratory laparotomy and omentectomy, noted to have frequent PVCs, a run of NSVT, and an elevated hs-troponin.     The patient is asymptomatic from his underlying cardiac illness. While the patient is NPO, intravenous beta-blockers can be used to help control his heart rate and suppress ventricular ectopy, but it may be difficult to accomplish this with the intermittent nature of IV dosing. Once the patient is able to tolerate PO, I would restart his beta-blocker, along with his other cardiac medications as permitted by his post-operative state.     Ultimately, the patient should be maintained on his outpatient medications, including warfarin, carvedilol and lisinopril. Given his known history of coronary disease, I would start atorvastatin rather than simvastatin.     Aspirin and warfarin can be restarted when it is felt to be safe from a surgical perspective, though aspirin should be prioritized given his multiple prior stents. It would also be reasonable to consider substituting a DOAC for his anticoagulation for atrial fibrillation, as the safety profile of these newer agents is superior to that of warfarin.      Tom Bai MD  Cardiology  [id number]

## 2023-10-09 NOTE — PHYSICAL THERAPY INITIAL EVALUATION ADULT - GAIT DEVIATIONS NOTED, PT EVAL
forward flexed posture/decreased bharath/decreased velocity of limb motion/decreased step length/decreased stride length/decreased weight-shifting ability

## 2023-10-09 NOTE — PHYSICAL THERAPY INITIAL EVALUATION ADULT - ADDITIONAL COMMENTS
Pt lives in a private house with his wife and child; there are 2 steps to enter and ~10 steps inside to bedroom.

## 2023-10-10 LAB
ANION GAP SERPL CALC-SCNC: 11 MMOL/L — SIGNIFICANT CHANGE UP (ref 7–14)
BUN SERPL-MCNC: 9 MG/DL — SIGNIFICANT CHANGE UP (ref 7–23)
CALCIUM SERPL-MCNC: 8.2 MG/DL — LOW (ref 8.4–10.5)
CHLORIDE SERPL-SCNC: 100 MMOL/L — SIGNIFICANT CHANGE UP (ref 98–107)
CO2 SERPL-SCNC: 24 MMOL/L — SIGNIFICANT CHANGE UP (ref 22–31)
CREAT SERPL-MCNC: 0.76 MG/DL — SIGNIFICANT CHANGE UP (ref 0.5–1.3)
EGFR: 94 ML/MIN/1.73M2 — SIGNIFICANT CHANGE UP
GLUCOSE SERPL-MCNC: 121 MG/DL — HIGH (ref 70–99)
HCT VFR BLD CALC: 28.4 % — LOW (ref 39–50)
HGB BLD-MCNC: 9.5 G/DL — LOW (ref 13–17)
MAGNESIUM SERPL-MCNC: 1.8 MG/DL — SIGNIFICANT CHANGE UP (ref 1.6–2.6)
MCHC RBC-ENTMCNC: 29.9 PG — SIGNIFICANT CHANGE UP (ref 27–34)
MCHC RBC-ENTMCNC: 33.5 GM/DL — SIGNIFICANT CHANGE UP (ref 32–36)
MCV RBC AUTO: 89.3 FL — SIGNIFICANT CHANGE UP (ref 80–100)
NRBC # BLD: 0 /100 WBCS — SIGNIFICANT CHANGE UP (ref 0–0)
NRBC # FLD: 0 K/UL — SIGNIFICANT CHANGE UP (ref 0–0)
PHOSPHATE SERPL-MCNC: 1.6 MG/DL — LOW (ref 2.5–4.5)
PLATELET # BLD AUTO: 198 K/UL — SIGNIFICANT CHANGE UP (ref 150–400)
POTASSIUM SERPL-MCNC: 3.5 MMOL/L — SIGNIFICANT CHANGE UP (ref 3.5–5.3)
POTASSIUM SERPL-SCNC: 3.5 MMOL/L — SIGNIFICANT CHANGE UP (ref 3.5–5.3)
RBC # BLD: 3.18 M/UL — LOW (ref 4.2–5.8)
RBC # FLD: 14.1 % — SIGNIFICANT CHANGE UP (ref 10.3–14.5)
SODIUM SERPL-SCNC: 135 MMOL/L — SIGNIFICANT CHANGE UP (ref 135–145)
WBC # BLD: 7.73 K/UL — SIGNIFICANT CHANGE UP (ref 3.8–10.5)
WBC # FLD AUTO: 7.73 K/UL — SIGNIFICANT CHANGE UP (ref 3.8–10.5)

## 2023-10-10 RX ORDER — POTASSIUM CHLORIDE 20 MEQ
10 PACKET (EA) ORAL
Refills: 0 | Status: COMPLETED | OUTPATIENT
Start: 2023-10-10 | End: 2023-10-10

## 2023-10-10 RX ORDER — SODIUM CHLORIDE 9 MG/ML
1000 INJECTION INTRAMUSCULAR; INTRAVENOUS; SUBCUTANEOUS
Refills: 0 | Status: DISCONTINUED | OUTPATIENT
Start: 2023-10-10 | End: 2023-10-11

## 2023-10-10 RX ORDER — MAGNESIUM SULFATE 500 MG/ML
2 VIAL (ML) INJECTION ONCE
Refills: 0 | Status: COMPLETED | OUTPATIENT
Start: 2023-10-10 | End: 2023-10-10

## 2023-10-10 RX ORDER — ACETAMINOPHEN 500 MG
1000 TABLET ORAL EVERY 6 HOURS
Refills: 0 | Status: COMPLETED | OUTPATIENT
Start: 2023-10-10 | End: 2023-10-11

## 2023-10-10 RX ORDER — POTASSIUM PHOSPHATE, MONOBASIC POTASSIUM PHOSPHATE, DIBASIC 236; 224 MG/ML; MG/ML
30 INJECTION, SOLUTION INTRAVENOUS ONCE
Refills: 0 | Status: COMPLETED | OUTPATIENT
Start: 2023-10-10 | End: 2023-10-10

## 2023-10-10 RX ADMIN — Medication 400 MILLIGRAM(S): at 12:25

## 2023-10-10 RX ADMIN — Medication 1000 MILLIGRAM(S): at 13:00

## 2023-10-10 RX ADMIN — HYDROMORPHONE HYDROCHLORIDE 30 MILLILITER(S): 2 INJECTION INTRAMUSCULAR; INTRAVENOUS; SUBCUTANEOUS at 07:14

## 2023-10-10 RX ADMIN — HYDROMORPHONE HYDROCHLORIDE 30 MILLILITER(S): 2 INJECTION INTRAMUSCULAR; INTRAVENOUS; SUBCUTANEOUS at 19:18

## 2023-10-10 RX ADMIN — SODIUM CHLORIDE 84 MILLILITER(S): 9 INJECTION, SOLUTION INTRAVENOUS at 01:16

## 2023-10-10 RX ADMIN — Medication 100 MILLIEQUIVALENT(S): at 12:00

## 2023-10-10 RX ADMIN — Medication 100 MILLIEQUIVALENT(S): at 11:00

## 2023-10-10 RX ADMIN — POTASSIUM PHOSPHATE, MONOBASIC POTASSIUM PHOSPHATE, DIBASIC 83.33 MILLIMOLE(S): 236; 224 INJECTION, SOLUTION INTRAVENOUS at 11:13

## 2023-10-10 RX ADMIN — Medication 5 MILLIGRAM(S): at 17:22

## 2023-10-10 RX ADMIN — Medication 100 MILLIEQUIVALENT(S): at 13:23

## 2023-10-10 RX ADMIN — Medication 400 MILLIGRAM(S): at 17:26

## 2023-10-10 RX ADMIN — Medication 25 GRAM(S): at 11:20

## 2023-10-10 RX ADMIN — Medication 5 MILLIGRAM(S): at 00:10

## 2023-10-10 RX ADMIN — LIDOCAINE 1 PATCH: 4 CREAM TOPICAL at 00:20

## 2023-10-10 RX ADMIN — SODIUM CHLORIDE 30 MILLILITER(S): 9 INJECTION INTRAMUSCULAR; INTRAVENOUS; SUBCUTANEOUS at 19:19

## 2023-10-10 RX ADMIN — Medication 5 MILLIGRAM(S): at 11:21

## 2023-10-10 RX ADMIN — Medication 5 MILLIGRAM(S): at 05:56

## 2023-10-10 RX ADMIN — ENOXAPARIN SODIUM 40 MILLIGRAM(S): 100 INJECTION SUBCUTANEOUS at 17:23

## 2023-10-10 NOTE — PROGRESS NOTE ADULT - ASSESSMENT
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 Planned HIPEC and further debulking abandoned due to patient cardiac arrhythmias and need for pressors in OR. Course c/b ventricular arrhythmia.    PLAN:  - Pain control: Tylenol ATC, Dilaudid PCA  - Lopressor 5mg q6h IVP while NPO  - Diet: NPO/IVF  - Cardiology following: recommending when able, restart home Carvedilol and Lisinopril, and Aspirin. Change Simvastatin to Atorvastatin. **Consider change Warfarin to DOAC.   - EP: ICD interrogated   - Midline prevena to be removed on POD5  - DVT ppx: SCD's & lovenox    D Team Surgery  [id number]

## 2023-10-10 NOTE — PROGRESS NOTE ADULT - SUBJECTIVE AND OBJECTIVE BOX
TEAM [ ** ] Surgery Daily Progress Note  =====================================================    SUBJECTIVE: Patient seen and examined at bedside on AM rounds. Patient reports that they're feeling well. NPO/Tolerating diet, denies nausea, vomiting.    Flatus/    BM. OOB/Amublating as tolerated. Denies fever, chills.    PMH:   ***    ALLERGIES:  No Known Allergies      --------------------------------------------------------------------------------------    MEDICATIONS:    Neurologic Medications  HYDROmorphone PCA (1 mG/mL) 30 milliLiter(s) PCA Continuous PCA Continuous  HYDROmorphone PCA (1 mG/mL) Rescue Clinician Bolus 0.5 milliGRAM(s) IV Push every 15 minutes PRN for Pain Scale GREATER THAN 6  ondansetron Injectable 4 milliGRAM(s) IV Push every 6 hours PRN Nausea    Respiratory Medications    Cardiovascular Medications  metoprolol tartrate Injectable 5 milliGRAM(s) IV Push every 6 hours    Gastrointestinal Medications  dextrose 5% + sodium chloride 0.9%. 1000 milliLiter(s) IV Continuous <Continuous>  lactated ringers 1000 milliLiter(s) IV Continuous <Continuous>  sodium chloride 0.9% lock flush 10 milliLiter(s) IV Push every 1 hour PRN Pre/post blood products, medications, blood draw, and to maintain line patency    Genitourinary Medications    Hematologic/Oncologic Medications  enoxaparin Injectable 40 milliGRAM(s) SubCutaneous every 24 hours  influenza  Vaccine (HIGH DOSE) 0.7 milliLiter(s) IntraMuscular once    Antimicrobial/Immunologic Medications    Endocrine/Metabolic Medications    Topical/Other Medications  lidocaine   4% Patch 1 Patch Transdermal every 24 hours  lidocaine   4% Patch 1 Patch Transdermal every 24 hours  naloxone Injectable 0.1 milliGRAM(s) IV Push every 3 minutes PRN For ANY of the following changes in patient status:  A. RR LESS THAN 10 breaths per minute, B. Oxygen saturation LESS THAN 90%, C. Sedation score of 6    --------------------------------------------------------------------------------------    VITAL SIGNS:  T(C): 36.8 (10-10-23 @ 05:30), Max: 37 (10-09-23 @ 08:30)  HR: 99 (10-10-23 @ 05:30) (80 - 108)  BP: 117/61 (10-10-23 @ 05:30) (104/58 - 128/69)  RR: 16 (10-10-23 @ 05:30) (16 - 16)  SpO2: 97% (10-10-23 @ 05:30) (94% - 98%)  --------------------------------------------------------------------------------------    EXAM    General: NAD, resting in bed comfortably.  Cardiac: regular rate, warm and well perfused  Respiratory: Nonlabored respirations, normal cw expansion.  Abdomen: soft, nontender, nondistended, midline preveena vac c/d/i  Extremities: normal strength, FROM, no deformities    --------------------------------------------------------------------------------------        INS AND OUTS:    10-09-23 @ 07:01  -  10-10-23 @ 07:00  --------------------------------------------------------  IN: 1159 mL / OUT: 1025 mL / NET: 134 mL      -------------------------------------------------------------------------------------- TEAM [ D ] Surgery Daily Progress Note  =====================================================    SUBJECTIVE: Patient seen and examined at bedside on AM rounds. Patient reports that they're feeling well. NPO, denies nausea, vomiting.    +Flatus x1 /   - BM. OOB/Amublating as tolerated. Denies fever, chills.    ALLERGIES:  No Known Allergies      --------------------------------------------------------------------------------------    MEDICATIONS:    Neurologic Medications  HYDROmorphone PCA (1 mG/mL) 30 milliLiter(s) PCA Continuous PCA Continuous  HYDROmorphone PCA (1 mG/mL) Rescue Clinician Bolus 0.5 milliGRAM(s) IV Push every 15 minutes PRN for Pain Scale GREATER THAN 6  ondansetron Injectable 4 milliGRAM(s) IV Push every 6 hours PRN Nausea    Cardiovascular Medications  metoprolol tartrate Injectable 5 milliGRAM(s) IV Push every 6 hours    Gastrointestinal Medications  dextrose 5% + sodium chloride 0.9%. 1000 milliLiter(s) IV Continuous <Continuous>  lactated ringers 1000 milliLiter(s) IV Continuous <Continuous>  sodium chloride 0.9% lock flush 10 milliLiter(s) IV Push every 1 hour PRN Pre/post blood products, medications, blood draw, and to maintain line patency    Hematologic/Oncologic Medications  enoxaparin Injectable 40 milliGRAM(s) SubCutaneous every 24 hours  influenza  Vaccine (HIGH DOSE) 0.7 milliLiter(s) IntraMuscular once    Topical/Other Medications  lidocaine   4% Patch 1 Patch Transdermal every 24 hours  lidocaine   4% Patch 1 Patch Transdermal every 24 hours  naloxone Injectable 0.1 milliGRAM(s) IV Push every 3 minutes PRN For ANY of the following changes in patient status:  A. RR LESS THAN 10 breaths per minute, B. Oxygen saturation LESS THAN 90%, C. Sedation score of 6    --------------------------------------------------------------------------------------    VITAL SIGNS:  T(C): 36.8 (10-10-23 @ 05:30), Max: 37 (10-09-23 @ 08:30)  HR: 99 (10-10-23 @ 05:30) (80 - 108)  BP: 117/61 (10-10-23 @ 05:30) (104/58 - 128/69)  RR: 16 (10-10-23 @ 05:30) (16 - 16)  SpO2: 97% (10-10-23 @ 05:30) (94% - 98%)  --------------------------------------------------------------------------------------    EXAM    General: NAD, resting in bed comfortably.  Cardiac: regular rate, warm and well perfused  Respiratory: Nonlabored respirations, normal cw expansion.  Abdomen: soft, nontender, nondistended, midline preveena vac c/d/i  Extremities: normal strength, FROM, no deformities    --------------------------------------------------------------------------------------        INS AND OUTS:    10-09-23 @ 07:01  -  10-10-23 @ 07:00  --------------------------------------------------------  IN: 1159 mL / OUT: 1025 mL / NET: 134 mL      --------------------------------------------------------------------------------------

## 2023-10-10 NOTE — PROGRESS NOTE ADULT - SUBJECTIVE AND OBJECTIVE BOX
Anesthesia Pain Management Service    SUBJECTIVE: Patient is doing well with IV PCA and no significant problems reported.  Patient states he had no pain, until he ambulated around the unit.  Dr. Quiroga states the IV PCA has been helping a lot for his post-op pain. The patient is more concerned about starting his home anti-hypertensive medications. Patient's HIPEC procedure not done because patient had cardiac arrhythmia intraop and patient needed pressors.     Pain Scale Score	At rest: __0/10_ 	With Activity: _7/10_ 	[X ] Refer to charted pain scores    THERAPY:    [ ] IV PCA Morphine		[ ] 5 mg/mL	[ ] 1 mg/mL  [X ] IV PCA Hydromorphone	[ ] 5 mg/mL	[X ] 1 mg/mL  [ ] IV PCA Fentanyl		[ ] 50 micrograms/mL    Demand dose __0.25_ lockout __6_ (minutes) Continuous Rate _0__ Total: __5.9_  mg used (in past 24 hours)      MEDICATIONS  (STANDING):  dextrose 5% + sodium chloride 0.9%. 1000 milliLiter(s) (84 mL/Hr) IV Continuous <Continuous>  enoxaparin Injectable 40 milliGRAM(s) SubCutaneous every 24 hours  HYDROmorphone PCA (1 mG/mL) 30 milliLiter(s) PCA Continuous PCA Continuous  influenza  Vaccine (HIGH DOSE) 0.7 milliLiter(s) IntraMuscular once  lactated ringers 1000 milliLiter(s) (84 mL/Hr) IV Continuous <Continuous>  lidocaine   4% Patch 1 Patch Transdermal every 24 hours  lidocaine   4% Patch 1 Patch Transdermal every 24 hours  magnesium sulfate  IVPB 2 Gram(s) IV Intermittent once  metoprolol tartrate Injectable 5 milliGRAM(s) IV Push every 6 hours  potassium chloride  10 mEq/100 mL IVPB 10 milliEquivalent(s) IV Intermittent every 1 hour  potassium phosphate IVPB 30 milliMole(s) IV Intermittent once    MEDICATIONS  (PRN):  HYDROmorphone PCA (1 mG/mL) Rescue Clinician Bolus 0.5 milliGRAM(s) IV Push every 15 minutes PRN for Pain Scale GREATER THAN 6  naloxone Injectable 0.1 milliGRAM(s) IV Push every 3 minutes PRN For ANY of the following changes in patient status:  A. RR LESS THAN 10 breaths per minute, B. Oxygen saturation LESS THAN 90%, C. Sedation score of 6  ondansetron Injectable 4 milliGRAM(s) IV Push every 6 hours PRN Nausea  sodium chloride 0.9% lock flush 10 milliLiter(s) IV Push every 1 hour PRN Pre/post blood products, medications, blood draw, and to maintain line patency      OBJECTIVE:  Patient is sitting up in chair, NPO.     Sedation Score:	[ X] Alert	 [ ] Drowsy 	[ ] Arousable	[ ] Asleep	[ ] Unresponsive    Side Effects:	[X ] None	[ ] Nausea	[ ] Vomiting	[ ] Pruritus  		[ ] Other:    Vital Signs Last 24 Hrs  T(C): 36.9 (10 Oct 2023 08:42), Max: 36.9 (10 Oct 2023 08:42)  T(F): 98.4 (10 Oct 2023 08:42), Max: 98.4 (10 Oct 2023 08:42)  HR: 99 (10 Oct 2023 08:42) (80 - 108)  BP: 131/59 (10 Oct 2023 08:42) (104/58 - 131/59)  BP(mean): --  RR: 16 (10 Oct 2023 08:42) (16 - 16)  SpO2: 95% (10 Oct 2023 08:42) (94% - 97%)    Parameters below as of 10 Oct 2023 08:42  Patient On (Oxygen Delivery Method): room air        ASSESSMENT/ PLAN    Therapy to  be:	[ X] Continue   [ ] Discontinued   [ ] Change to prn Analgesics    Documentation and Verification of current medications:   [X] Done	[ ] Not done, not elligible    Comments: Patient still NPO, will continue with IV Dilaudid PCA.  Recommend non-opioid adjuvant analgesics to be used when possible and when allowed by primary surgical team.    Progress Note written now but Patient was seen earlier.

## 2023-10-11 ENCOUNTER — TRANSCRIPTION ENCOUNTER (OUTPATIENT)
Age: 73
End: 2023-10-11

## 2023-10-11 LAB
ANION GAP SERPL CALC-SCNC: 9 MMOL/L — SIGNIFICANT CHANGE UP (ref 7–14)
BUN SERPL-MCNC: 7 MG/DL — SIGNIFICANT CHANGE UP (ref 7–23)
CALCIUM SERPL-MCNC: 8.3 MG/DL — LOW (ref 8.4–10.5)
CHLORIDE SERPL-SCNC: 101 MMOL/L — SIGNIFICANT CHANGE UP (ref 98–107)
CO2 SERPL-SCNC: 25 MMOL/L — SIGNIFICANT CHANGE UP (ref 22–31)
CREAT SERPL-MCNC: 0.73 MG/DL — SIGNIFICANT CHANGE UP (ref 0.5–1.3)
EGFR: 95 ML/MIN/1.73M2 — SIGNIFICANT CHANGE UP
GLUCOSE SERPL-MCNC: 107 MG/DL — HIGH (ref 70–99)
HCT VFR BLD CALC: 28.1 % — LOW (ref 39–50)
HGB BLD-MCNC: 9.2 G/DL — LOW (ref 13–17)
MAGNESIUM SERPL-MCNC: 2 MG/DL — SIGNIFICANT CHANGE UP (ref 1.6–2.6)
MCHC RBC-ENTMCNC: 29.6 PG — SIGNIFICANT CHANGE UP (ref 27–34)
MCHC RBC-ENTMCNC: 32.7 GM/DL — SIGNIFICANT CHANGE UP (ref 32–36)
MCV RBC AUTO: 90.4 FL — SIGNIFICANT CHANGE UP (ref 80–100)
NRBC # BLD: 0 /100 WBCS — SIGNIFICANT CHANGE UP (ref 0–0)
NRBC # FLD: 0 K/UL — SIGNIFICANT CHANGE UP (ref 0–0)
PHOSPHATE SERPL-MCNC: 2.4 MG/DL — LOW (ref 2.5–4.5)
PLATELET # BLD AUTO: 210 K/UL — SIGNIFICANT CHANGE UP (ref 150–400)
POTASSIUM SERPL-MCNC: 3.9 MMOL/L — SIGNIFICANT CHANGE UP (ref 3.5–5.3)
POTASSIUM SERPL-SCNC: 3.9 MMOL/L — SIGNIFICANT CHANGE UP (ref 3.5–5.3)
RBC # BLD: 3.11 M/UL — LOW (ref 4.2–5.8)
RBC # FLD: 14.3 % — SIGNIFICANT CHANGE UP (ref 10.3–14.5)
SODIUM SERPL-SCNC: 135 MMOL/L — SIGNIFICANT CHANGE UP (ref 135–145)
WBC # BLD: 7.39 K/UL — SIGNIFICANT CHANGE UP (ref 3.8–10.5)
WBC # FLD AUTO: 7.39 K/UL — SIGNIFICANT CHANGE UP (ref 3.8–10.5)

## 2023-10-11 PROCEDURE — 99232 SBSQ HOSP IP/OBS MODERATE 35: CPT | Mod: GC

## 2023-10-11 RX ORDER — ACETAMINOPHEN 500 MG
1000 TABLET ORAL EVERY 6 HOURS
Refills: 0 | Status: DISCONTINUED | OUTPATIENT
Start: 2023-10-11 | End: 2023-10-14

## 2023-10-11 RX ORDER — OXYCODONE HYDROCHLORIDE 5 MG/1
5 TABLET ORAL
Refills: 0 | Status: DISCONTINUED | OUTPATIENT
Start: 2023-10-11 | End: 2023-10-13

## 2023-10-11 RX ORDER — CARVEDILOL PHOSPHATE 80 MG/1
25 CAPSULE, EXTENDED RELEASE ORAL EVERY 12 HOURS
Refills: 0 | Status: DISCONTINUED | OUTPATIENT
Start: 2023-10-11 | End: 2023-10-18

## 2023-10-11 RX ORDER — ATORVASTATIN CALCIUM 80 MG/1
40 TABLET, FILM COATED ORAL AT BEDTIME
Refills: 0 | Status: DISCONTINUED | OUTPATIENT
Start: 2023-10-11 | End: 2023-10-18

## 2023-10-11 RX ORDER — OXYCODONE HYDROCHLORIDE 5 MG/1
10 TABLET ORAL EVERY 4 HOURS
Refills: 0 | Status: DISCONTINUED | OUTPATIENT
Start: 2023-10-11 | End: 2023-10-13

## 2023-10-11 RX ORDER — TAMSULOSIN HYDROCHLORIDE 0.4 MG/1
0.4 CAPSULE ORAL AT BEDTIME
Refills: 0 | Status: DISCONTINUED | OUTPATIENT
Start: 2023-10-11 | End: 2023-10-18

## 2023-10-11 RX ORDER — SODIUM CHLORIDE 9 MG/ML
1000 INJECTION INTRAMUSCULAR; INTRAVENOUS; SUBCUTANEOUS
Refills: 0 | Status: DISCONTINUED | OUTPATIENT
Start: 2023-10-11 | End: 2023-10-12

## 2023-10-11 RX ORDER — ACETAMINOPHEN 500 MG
650 TABLET ORAL EVERY 8 HOURS
Refills: 0 | Status: DISCONTINUED | OUTPATIENT
Start: 2023-10-11 | End: 2023-10-11

## 2023-10-11 RX ORDER — HYDROMORPHONE HYDROCHLORIDE 2 MG/ML
0.5 INJECTION INTRAMUSCULAR; INTRAVENOUS; SUBCUTANEOUS
Refills: 0 | Status: DISCONTINUED | OUTPATIENT
Start: 2023-10-11 | End: 2023-10-13

## 2023-10-11 RX ORDER — LISINOPRIL 2.5 MG/1
5 TABLET ORAL DAILY
Refills: 0 | Status: DISCONTINUED | OUTPATIENT
Start: 2023-10-11 | End: 2023-10-18

## 2023-10-11 RX ADMIN — ATORVASTATIN CALCIUM 40 MILLIGRAM(S): 80 TABLET, FILM COATED ORAL at 21:19

## 2023-10-11 RX ADMIN — OXYCODONE HYDROCHLORIDE 10 MILLIGRAM(S): 5 TABLET ORAL at 22:24

## 2023-10-11 RX ADMIN — HYDROMORPHONE HYDROCHLORIDE 30 MILLILITER(S): 2 INJECTION INTRAMUSCULAR; INTRAVENOUS; SUBCUTANEOUS at 07:20

## 2023-10-11 RX ADMIN — HYDROMORPHONE HYDROCHLORIDE 0.5 MILLIGRAM(S): 2 INJECTION INTRAMUSCULAR; INTRAVENOUS; SUBCUTANEOUS at 17:33

## 2023-10-11 RX ADMIN — SODIUM CHLORIDE 50 MILLILITER(S): 9 INJECTION INTRAMUSCULAR; INTRAVENOUS; SUBCUTANEOUS at 21:18

## 2023-10-11 RX ADMIN — CARVEDILOL PHOSPHATE 25 MILLIGRAM(S): 80 CAPSULE, EXTENDED RELEASE ORAL at 17:05

## 2023-10-11 RX ADMIN — OXYCODONE HYDROCHLORIDE 10 MILLIGRAM(S): 5 TABLET ORAL at 15:15

## 2023-10-11 RX ADMIN — Medication 5 MILLIGRAM(S): at 11:32

## 2023-10-11 RX ADMIN — ENOXAPARIN SODIUM 40 MILLIGRAM(S): 100 INJECTION SUBCUTANEOUS at 17:07

## 2023-10-11 RX ADMIN — TAMSULOSIN HYDROCHLORIDE 0.4 MILLIGRAM(S): 0.4 CAPSULE ORAL at 21:20

## 2023-10-11 RX ADMIN — Medication 1000 MILLIGRAM(S): at 17:09

## 2023-10-11 RX ADMIN — SODIUM CHLORIDE 50 MILLILITER(S): 9 INJECTION INTRAMUSCULAR; INTRAVENOUS; SUBCUTANEOUS at 14:59

## 2023-10-11 RX ADMIN — Medication 63.75 MILLIMOLE(S): at 09:33

## 2023-10-11 RX ADMIN — HYDROMORPHONE HYDROCHLORIDE 0.5 MILLIGRAM(S): 2 INJECTION INTRAMUSCULAR; INTRAVENOUS; SUBCUTANEOUS at 17:09

## 2023-10-11 RX ADMIN — Medication 1000 MILLIGRAM(S): at 17:33

## 2023-10-11 RX ADMIN — OXYCODONE HYDROCHLORIDE 10 MILLIGRAM(S): 5 TABLET ORAL at 21:54

## 2023-10-11 RX ADMIN — Medication 5 MILLIGRAM(S): at 06:20

## 2023-10-11 RX ADMIN — OXYCODONE HYDROCHLORIDE 10 MILLIGRAM(S): 5 TABLET ORAL at 16:01

## 2023-10-11 NOTE — DIETITIAN INITIAL EVALUATION ADULT - PERTINENT LABORATORY DATA
10-11    135  |  101  |  7   ----------------------------<  107<H>  3.9   |  25  |  0.73    Ca    8.3<L>      11 Oct 2023 06:13  Phos  2.4     10-11  Mg     2.00     10-11    A1C with Estimated Average Glucose Result: 5.9 % (09-25-23 @ 10:50)

## 2023-10-11 NOTE — DISCHARGE NOTE PROVIDER - NSDCFUADDAPPT_GEN_ALL_CORE_FT
Please follow up with your primary care provider 1-2 weeks after discharge regarding recent hospitalization.     Please resume home coumadin 5 mg daily and follow up with cardiologist, Dr Steffen Gaitan for INR check. Please call [phone] to schedule an appointment within 1 week of discharge

## 2023-10-11 NOTE — PROGRESS NOTE ADULT - SUBJECTIVE AND OBJECTIVE BOX
TEAM [ D ] Surgery Daily Progress Note  =====================================================    SUBJECTIVE: Patient seen and examined at bedside on AM rounds. Patient reports that they're feeling well. Tolerating CLD diet, denies nausea, vomiting.  +  Flatus/ -  BM. OOB/Amublating as tolerated. Denies fever, chills.    ALLERGIES:  No Known Allergies      --------------------------------------------------------------------------------------    MEDICATIONS:    Neurologic Medications  HYDROmorphone PCA (1 mG/mL) 30 milliLiter(s) PCA Continuous PCA Continuous  HYDROmorphone PCA (1 mG/mL) Rescue Clinician Bolus 0.5 milliGRAM(s) IV Push every 15 minutes PRN for Pain Scale GREATER THAN 6  ondansetron Injectable 4 milliGRAM(s) IV Push every 6 hours PRN Nausea    Respiratory Medications    Cardiovascular Medications  metoprolol tartrate Injectable 5 milliGRAM(s) IV Push every 6 hours    Gastrointestinal Medications  sodium chloride 0.9% lock flush 10 milliLiter(s) IV Push every 1 hour PRN Pre/post blood products, medications, blood draw, and to maintain line patency  sodium chloride 0.9%. 1000 milliLiter(s) IV Continuous <Continuous>    Genitourinary Medications    Hematologic/Oncologic Medications  enoxaparin Injectable 40 milliGRAM(s) SubCutaneous every 24 hours  influenza  Vaccine (HIGH DOSE) 0.7 milliLiter(s) IntraMuscular once    Antimicrobial/Immunologic Medications    Endocrine/Metabolic Medications    Topical/Other Medications  lidocaine   4% Patch 1 Patch Transdermal every 24 hours  lidocaine   4% Patch 1 Patch Transdermal every 24 hours  naloxone Injectable 0.1 milliGRAM(s) IV Push every 3 minutes PRN For ANY of the following changes in patient status:  A. RR LESS THAN 10 breaths per minute, B. Oxygen saturation LESS THAN 90%, C. Sedation score of 6    --------------------------------------------------------------------------------------    VITAL SIGNS:  T(C): 37.4 (10-11-23 @ 04:02), Max: 37.4 (10-11-23 @ 04:02)  HR: 98 (10-11-23 @ 06:15) (47 - 99)  BP: 127/64 (10-11-23 @ 06:15) (106/55 - 131/59)  RR: 18 (10-11-23 @ 04:02) (16 - 18)  SpO2: 95% (10-11-23 @ 04:02) (95% - 97%)  --------------------------------------------------------------------------------------    EXAM    General: NAD, resting in bed comfortably.  Cardiac: regular rate, warm and well perfused  Respiratory: Nonlabored respirations, normal cw expansion.  Abdomen: soft, nontender, nondistended. preveena vac down today, incision is c/d/i=   Extremities: normal strength, FROM, no deformities    --------------------------------------------------------------------------------------    LABS                        9.5    7.73  )-----------( 198      ( 10 Oct 2023 05:08 )             28.4   10-10    135  |  100  |  9   ----------------------------<  121<H>  3.5   |  24  |  0.76    Ca    8.2<L>      10 Oct 2023 05:08  Phos  1.6     10-10  Mg     1.80     10-10        --------------------------------------------------------------------------------------    INS AND OUTS:    10-10-23 @ 07:01  -  10-11-23 @ 07:00  --------------------------------------------------------  IN: 2157 mL / OUT: 900 mL / NET: 1257 mL      --------------------------------------------------------------------------------------

## 2023-10-11 NOTE — DIETITIAN INITIAL EVALUATION ADULT - NS FNS DIET ORDER
Diet, Clear Liquid:   Supplement Feeding Modality:  Oral  Ensure Clear Cans or Servings Per Day:  1       Frequency:  Two Times a day (10-10-23 @ 11:59) [Active]

## 2023-10-11 NOTE — DIETITIAN INITIAL EVALUATION ADULT - PERTINENT MEDS FT
MEDICATIONS  (STANDING):  acetaminophen     Tablet .. 1000 milliGRAM(s) Oral every 6 hours  enoxaparin Injectable 40 milliGRAM(s) SubCutaneous every 24 hours  influenza  Vaccine (HIGH DOSE) 0.7 milliLiter(s) IntraMuscular once  lidocaine   4% Patch 1 Patch Transdermal every 24 hours  lidocaine   4% Patch 1 Patch Transdermal every 24 hours  metoprolol tartrate Injectable 5 milliGRAM(s) IV Push every 6 hours    MEDICATIONS  (PRN):  HYDROmorphone  Injectable 0.5 milliGRAM(s) IV Push every 3 hours PRN Severe breakthrough Pain (7 - 10)  naloxone Injectable 0.1 milliGRAM(s) IV Push every 3 minutes PRN For ANY of the following changes in patient status:  A. RR LESS THAN 10 breaths per minute, B. Oxygen saturation LESS THAN 90%, C. Sedation score of 6  ondansetron Injectable 4 milliGRAM(s) IV Push every 6 hours PRN Nausea  oxyCODONE    IR 5 milliGRAM(s) Oral every 3 hours PRN Moderate Pain (4 - 6)  oxyCODONE    IR 10 milliGRAM(s) Oral every 4 hours PRN Severe Pain (7 - 10)  sodium chloride 0.9% lock flush 10 milliLiter(s) IV Push every 1 hour PRN Pre/post blood products, medications, blood draw, and to maintain line patency

## 2023-10-11 NOTE — DISCHARGE NOTE PROVIDER - NSDCFUSCHEDAPPT_GEN_ALL_CORE_FT
[facility]  [facility] [address]   Scheduled Appointment: 11/03/2023     Henrietta Montilla  [facility]  [facility]  Scheduled Appointment: 11/03/2023    [facility]  [facility] [address]  Scheduled Appointment: 11/03/2023

## 2023-10-11 NOTE — PROGRESS NOTE ADULT - SUBJECTIVE AND OBJECTIVE BOX
Anesthesia Pain Management Service    SUBJECTIVE: Patient states the IV PCA helps and that his pain is intermittent.  There are times when he has no pain and other times when it becomes 8/10 pain.  Patient states that he is ready and willing to try po oral pain medications for better pain control.    Pain Scale Score	At rest: __0/10_ 	With Activity: __8/10_ 	[X ] Refer to charted pain scores    THERAPY:    [ ] IV PCA Morphine		[ ] 5 mg/mL	[ ] 1 mg/mL  [X ] IV PCA Hydromorphone	[ ] 5 mg/mL	[X ] 1 mg/mL  [ ] IV PCA Fentanyl		[ ] 50 micrograms/mL    Demand dose __0.25_ lockout __6_ (minutes) Continuous Rate _0__ Total: __9.25_   mg used (in past 24 hrs)      MEDICATIONS  (STANDING):  acetaminophen     Tablet .. 1000 milliGRAM(s) Oral every 6 hours  enoxaparin Injectable 40 milliGRAM(s) SubCutaneous every 24 hours  influenza  Vaccine (HIGH DOSE) 0.7 milliLiter(s) IntraMuscular once  lidocaine   4% Patch 1 Patch Transdermal every 24 hours  lidocaine   4% Patch 1 Patch Transdermal every 24 hours  metoprolol tartrate Injectable 5 milliGRAM(s) IV Push every 6 hours    MEDICATIONS  (PRN):  HYDROmorphone  Injectable 0.5 milliGRAM(s) IV Push every 3 hours PRN Severe breakthrough Pain (7 - 10)  naloxone Injectable 0.1 milliGRAM(s) IV Push every 3 minutes PRN For ANY of the following changes in patient status:  A. RR LESS THAN 10 breaths per minute, B. Oxygen saturation LESS THAN 90%, C. Sedation score of 6  ondansetron Injectable 4 milliGRAM(s) IV Push every 6 hours PRN Nausea  oxyCODONE    IR 5 milliGRAM(s) Oral every 3 hours PRN Moderate Pain (4 - 6)  oxyCODONE    IR 10 milliGRAM(s) Oral every 4 hours PRN Severe Pain (7 - 10)  sodium chloride 0.9% lock flush 10 milliLiter(s) IV Push every 1 hour PRN Pre/post blood products, medications, blood draw, and to maintain line patency      OBJECTIVE:  Patient is sitting up in bed chair and tolerating clears.      Sedation Score:	[ X] Alert	[ ] Drowsy 	[ ] Arousable	[ ] Asleep	[ ] Unresponsive    Side Effects:	[X ] None	[ ] Nausea	[ ] Vomiting	[ ] Pruritus  		[ ] Other:    Vital Signs Last 24 Hrs  T(C): 36.7 (11 Oct 2023 10:05), Max: 37.4 (11 Oct 2023 04:02)  T(F): 98.1 (11 Oct 2023 10:05), Max: 99.3 (11 Oct 2023 04:02)  HR: 108 (11 Oct 2023 10:05) (47 - 108)  BP: 108/69 (11 Oct 2023 10:05) (106/55 - 127/64)  BP(mean): --  RR: 18 (11 Oct 2023 10:05) (17 - 18)  SpO2: 98% (11 Oct 2023 10:05) (95% - 98%)    Parameters below as of 11 Oct 2023 10:05  Patient On (Oxygen Delivery Method): room air        ASSESSMENT/ PLAN    Therapy to  be:	[ ] Continue   [ X] Discontinued   [X ] Change to prn Analgesics    Documentation and Verification of current medications:   [X] Done	[ ] Not done, not elligible    Comments: IV Dilaudid PCA discontinued. PRN Oral/IV opioids and/or Adjuvant non-opioid medication to be ordered at this point.    Progress Note written now but Patient was seen earlier.

## 2023-10-11 NOTE — DIETITIAN INITIAL EVALUATION ADULT - SIGNS/SYMPTOMS
as evidence by inadequate diet x5 days. 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

## 2023-10-11 NOTE — DISCHARGE NOTE PROVIDER - NSDCMRMEDTOKEN_GEN_ALL_CORE_FT
aspirin 81 mg oral tablet: 1 tab(s) orally once a day  carvedilol 25 mg oral tablet: 1 tab(s) orally every 12 hours  iron supplements: take 1 tab orally once a day  lisinopril 5 mg oral tablet: 1 tab(s) orally once a day  multivitamin: take 1 tab orally once a day  simvastatin 20 mg oral tablet: 1 tab(s) orally once a day (at bedtime)  tamsulosin 0.4 mg oral capsule: 1 cap(s) orally once a day  vitamin D3: take 1 cap orally once a day  warfarin 5 mg oral tablet: 1 tab(s) orally once a day   acetaminophen 325 mg oral tablet: 2 tab(s) orally every 6 hours As needed Mild Pain (1 - 3)  aspirin 81 mg oral tablet: 1 tab(s) orally once a day  carvedilol 25 mg oral tablet: 1 tab(s) orally every 12 hours  iron supplements: take 1 tab orally once a day  lisinopril 5 mg oral tablet: 1 tab(s) orally once a day  multivitamin: take 1 tab orally once a day  Rolling walker: for walking MDD: 1  simvastatin 20 mg oral tablet: 1 tab(s) orally once a day (at bedtime)  tamsulosin 0.4 mg oral capsule: 1 cap(s) orally once a day  vitamin D3: take 1 cap orally once a day  warfarin 5 mg oral tablet: 1 tab(s) orally once a day   acetaminophen 325 mg oral tablet: 2 tab(s) orally every 6 hours As needed Mild Pain (1 - 3)  aspirin 81 mg oral tablet: 1 tab(s) orally once a day  carvedilol 25 mg oral tablet: 1 tab(s) orally every 12 hours  iron supplements: take 1 tab orally once a day  lisinopril 5 mg oral tablet: 1 tab(s) orally once a day  MiraLax oral powder for reconstitution: 17 gram(s) orally once a day as needed for  constipation  multivitamin: take 1 tab orally once a day  oxyCODONE 5 mg oral tablet: 1 tab(s) orally every 6 hours as needed for  severe pain MDD: 4 tabs  Rolling walker: for walking MDD: 1  simvastatin 20 mg oral tablet: 1 tab(s) orally once a day (at bedtime)  tamsulosin 0.4 mg oral capsule: 1 cap(s) orally once a day  vitamin D3: take 1 cap orally once a day  warfarin 5 mg oral tablet: 1 tab(s) orally once a day

## 2023-10-11 NOTE — DIETITIAN INITIAL EVALUATION ADULT - ADD RECOMMEND
1) Diet advancement deferred to surgical team at this time.   2) Continue Ensure Clear 2x daily (360 micah and 16 gm protein).   3) Replete electrolytes prn   4) Monitor weights, labs, BM's, skin integrity, p.o. intake.   5) Encourage PO intake and honor food preferences as able.

## 2023-10-11 NOTE — DISCHARGE NOTE PROVIDER - CARE PROVIDER_API CALL
Cordell Silver  Surgery  [address] [place] [id number]  Phone: [phone]  Fax: [phone]  Follow Up Time: 2 weeks

## 2023-10-11 NOTE — DIETITIAN INITIAL EVALUATION ADULT - ORAL INTAKE PTA/DIET HISTORY
Pt lives at home with his spouse. Reports he was intermittent fasting 16:8 and had lost 20lbs intentionally. D3, iron, centrum multivitamin taken PTA. Pt reports appetite has been good and has not had any unintentional weight loss PTA.

## 2023-10-11 NOTE — PROGRESS NOTE ADULT - SUBJECTIVE AND OBJECTIVE BOX
The patient was seen and examined with the Cardiology Consultation Teaching Service.     No overnight events    No chest pain  No dyspnea, orthopnea or PND  No palpitations or dizziness     We saw the patient walking in the hallway today with assistance.   He reports that he is having more gas.     PMH/PSH:  Coronary artery disease  Percutaneous coronary intervention to the LAD and OM in 2008, RCA in 2018  Chronic heart failure with reduced LV function, LVEF 46%  ICD implantation  Atrial fibrillation    Comfortable-appearing man in no acute distress  Alert and oriented  Afebrile  Vital signs stable  JVP is not elevated  Clear lungs  Normal heart sounds  Extremities are warm and perfused  No peripheral edema     Normocytic anemia Hb 9.2  GFR 95    hs-troponin peak 266  CK-MB peak 19.4    Telemetry reveals continued PVCs, occasional ventricular bigeminy    Echocardiography demonstrates mildly reduced LV systolic function, LVEF 45-50-%, without significant valve disease; this is similar or better than prior studies

## 2023-10-11 NOTE — DISCHARGE NOTE PROVIDER - HOSPITAL COURSE
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 Planned HIPEC and further debulking abandoned due to patient cardiac arrhythmias and need for pressors in OR. Course c/b ventricular arrhythmia. Post operatively, patient recovered in SICU.     10/7 Cardiology consulted, recommended Lopressor 5mg IVP q6 hours.  10/8 Transferred from SICU to floors.  10/9 PT consulted, recommended home no PT needs. EP consulted for ICD interrogation.   10/10 Patient with return of bowel function. Started on CLD.  10/11 Preveena vac removed. Patient with continued bowel function, passing gas however c/o burping. Kept on Clears. Home medications restarted. PCA pump transitioned to PO pain regimen.   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 Planned HIPEC and further debulking abandoned due to patient cardiac arrhythmias and need for pressors in OR. Course c/b ventricular arrhythmia. Post operatively, patient recovered in SICU.     10/7 Cardiology consulted, recommended Lopressor 5mg IVP q6 hours.  10/8 Transferred from SICU to floors.  10/9 PT consulted, recommended home no PT needs. EP consulted for ICD interrogation.   10/10 Patient with return of bowel function. Started on CLD.  10/11 Preveena vac removed. Patient with continued bowel function, passing gas however c/o burping. Kept on Clears. Home medications restarted. PCA pump transitioned to PO pain regimen.   10/17 Surgical rapid called for vasovagal syncope. Patient was transferred to SICU for hemodynamic monitoring in the setting of extensive cardiac disease    Patient is hemodynamically stable. At this time, he is tolerating a diet, having bowel function, and ambulating. Patient understands to follow up with Dr Silver and Dr Steffen Gaitan outpatient to follow up INR after restarting coumadin 5 mg daily.

## 2023-10-11 NOTE — DISCHARGE NOTE PROVIDER - CARE PROVIDERS DIRECT ADDRESSES
[email or web address]

## 2023-10-11 NOTE — DISCHARGE NOTE PROVIDER - NSDCCPCAREPLAN_GEN_ALL_CORE_FT
PRINCIPAL DISCHARGE DIAGNOSIS  Diagnosis: Secondary malignant neoplasm retroperitoneum  Assessment and Plan of Treatment: WOUND CARE:  Please keep incisions clean and dry. Please do not Scrub or rub incisions. Do not use lotion or powder on incisions.   BATHING: You may shower and/or sponge bathe. You may use warm soapy water in the shower and rinse, pat dry.  ACTIVITY: No heavy lifting or straining. Otherwise, you may return to your usual level of physical activity. If you are taking narcotic pain medication DO NOT drive a car, operate machinery or make important decisions.  DIET: Return to your usual diet.  NOTIFY YOUR SURGEON IF YOU HAVE: any bleeding that does not stop, any pus draining from your wound(s), any fever (over 100.4 F) persistent nausea/vomiting, or if your pain is not controlled on your discharge pain medications, unable to urinate.  Please follow up with your primary care physician in one week regarding your hospitalization, bring copies of your discharge paperwork.  Please follow up with your surgeon, Dr. Silver within 2 weeks of discharge. Please call to schedule an appointment.      SECONDARY DISCHARGE DIAGNOSES  Diagnosis: Afib  Assessment and Plan of Treatment: **** NEW DOAC??? *** vs restart warfarin    Diagnosis: AICD (automatic cardioverter/defibrillator) present  Assessment and Plan of Treatment:

## 2023-10-11 NOTE — DIETITIAN INITIAL EVALUATION ADULT - OTHER INFO
Medical course: Per chart 73 year old male patient S/P ex lap w/ omentectomy and repair of serosa for secondary malignant neoplasm of the retroperitoneum. Planned HIPEC and further debulking abandoned due to patient cardiac arrhythmias and need for pressors in OR. Course c/b ventricular arrhythmia.    Nutrition interview: Pt A&Ox4, wife present at bedside. Pt had been NPO 10/6-10/10 s/p omenectomy 10/6. Surgery team awaiting return of bowel function. NGT removed 10/8. Pt was started on CLD today with Ensure Ensure Clear 2x daily (360 micah and 16 gm protein). Receiving inadequate diet x5 days. Pt was passing flatus on 10/10 now he is burping a lot therefore only consuming small amounts of clear liquids for now. No recent episodes of nausea or vomiting. No BM since 10/6 before surgery. Denies any chewing/swallowing difficulties. No known food allergies. Per HIE UBW ~160lb. Feeding skills: independent.   Labs notable for hypophosphatemia likely due to poor PO intake, recommend replete electrolytes prn.  Educated Pt that intermittent fasting not recommended at this time to support healing, Pt agreed.

## 2023-10-11 NOTE — PROGRESS NOTE ADULT - ASSESSMENT
Impression and Recommendations:   77-year-old man with coronary artery disease, prior PCI, chronic heart failure with recovered LV function with ICD implantation, and atrial fibrillation who is now s/p exploratory laparotomy and omentectomy, noted to have frequent PVCs, a run of NSVT, and an elevated hs-troponin.     The patient is asymptomatic from his underlying cardiac illness. While the patient is NPO, intravenous beta-blockers can be used to help control his heart rate and suppress ventricular ectopy, but it may be difficult to accomplish this with the intermittent nature of IV dosing. Once the patient is able to tolerate PO, I would restart his beta-blocker, along with his other cardiac medications as permitted by his post-operative state.     Device interrogation revealed one episode of NSVT at 182 beats per minute that lasted 16 beats on the day of his operation, but no other arrhythmia events were noted. It is unclear that a short run of NSVT would result in significant hypotension, but if further surgical procedures are planned, additional planning regarding management of his ventricular ectopy while he is NPO may be needed.     Ultimately, the patient should be maintained on his outpatient medications, including warfarin, carvedilol and lisinopril. Given his known history of coronary disease, I would start atorvastatin rather than simvastatin.     Aspirin and warfarin can be restarted when it is felt to be safe from a surgical perspective, though aspirin should be prioritized given his multiple prior stents. It would also be reasonable to consider substituting a DOAC for his anticoagulation for atrial fibrillation, as the safety profile of these newer agents is superior to that of warfarin.      Tom Bai MD  Cardiology  [phone]

## 2023-10-11 NOTE — PROGRESS NOTE ADULT - ASSESSMENT
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 Planned HIPEC and further debulking abandoned due to patient cardiac arrhythmias and need for pressors in OR. Course c/b ventricular arrhythmia.    PLAN:  - Pain control: Tylenol ATC, Dilaudid PCA  - Lopressor 5mg q6h IVP while NPO  - Diet: NPO/IVF  - Cardiology following: recommending when able, restart home Carvedilol and Lisinopril, and Aspirin. Change Simvastatin to Atorvastatin. **Consider change Warfarin to DOAC.   - EP: ICD interrogated   - DVT ppx: SCD's & lovenox    D Team Surgery  [id number]

## 2023-10-12 LAB
ANION GAP SERPL CALC-SCNC: 9 MMOL/L — SIGNIFICANT CHANGE UP (ref 7–14)
BUN SERPL-MCNC: 7 MG/DL — SIGNIFICANT CHANGE UP (ref 7–23)
CALCIUM SERPL-MCNC: 8 MG/DL — LOW (ref 8.4–10.5)
CHLORIDE SERPL-SCNC: 97 MMOL/L — LOW (ref 98–107)
CO2 SERPL-SCNC: 25 MMOL/L — SIGNIFICANT CHANGE UP (ref 22–31)
CREAT SERPL-MCNC: 0.75 MG/DL — SIGNIFICANT CHANGE UP (ref 0.5–1.3)
EGFR: 95 ML/MIN/1.73M2 — SIGNIFICANT CHANGE UP
GLUCOSE SERPL-MCNC: 121 MG/DL — HIGH (ref 70–99)
HCT VFR BLD CALC: 29.5 % — LOW (ref 39–50)
HGB BLD-MCNC: 9.7 G/DL — LOW (ref 13–17)
MAGNESIUM SERPL-MCNC: 1.8 MG/DL — SIGNIFICANT CHANGE UP (ref 1.6–2.6)
MCHC RBC-ENTMCNC: 29.5 PG — SIGNIFICANT CHANGE UP (ref 27–34)
MCHC RBC-ENTMCNC: 32.9 GM/DL — SIGNIFICANT CHANGE UP (ref 32–36)
MCV RBC AUTO: 89.7 FL — SIGNIFICANT CHANGE UP (ref 80–100)
NRBC # BLD: 0 /100 WBCS — SIGNIFICANT CHANGE UP (ref 0–0)
NRBC # FLD: 0 K/UL — SIGNIFICANT CHANGE UP (ref 0–0)
PHOSPHATE SERPL-MCNC: 2.7 MG/DL — SIGNIFICANT CHANGE UP (ref 2.5–4.5)
PLATELET # BLD AUTO: 213 K/UL — SIGNIFICANT CHANGE UP (ref 150–400)
POTASSIUM SERPL-MCNC: 3.8 MMOL/L — SIGNIFICANT CHANGE UP (ref 3.5–5.3)
POTASSIUM SERPL-SCNC: 3.8 MMOL/L — SIGNIFICANT CHANGE UP (ref 3.5–5.3)
RBC # BLD: 3.29 M/UL — LOW (ref 4.2–5.8)
RBC # FLD: 14.3 % — SIGNIFICANT CHANGE UP (ref 10.3–14.5)
SODIUM SERPL-SCNC: 131 MMOL/L — LOW (ref 135–145)
WBC # BLD: 7.5 K/UL — SIGNIFICANT CHANGE UP (ref 3.8–10.5)
WBC # FLD AUTO: 7.5 K/UL — SIGNIFICANT CHANGE UP (ref 3.8–10.5)

## 2023-10-12 PROCEDURE — 99232 SBSQ HOSP IP/OBS MODERATE 35: CPT | Mod: GC

## 2023-10-12 RX ORDER — ASPIRIN/CALCIUM CARB/MAGNESIUM 324 MG
81 TABLET ORAL DAILY
Refills: 0 | Status: DISCONTINUED | OUTPATIENT
Start: 2023-10-12 | End: 2023-10-16

## 2023-10-12 RX ORDER — SODIUM,POTASSIUM PHOSPHATES 278-250MG
1 POWDER IN PACKET (EA) ORAL ONCE
Refills: 0 | Status: COMPLETED | OUTPATIENT
Start: 2023-10-12 | End: 2023-10-12

## 2023-10-12 RX ADMIN — CARVEDILOL PHOSPHATE 25 MILLIGRAM(S): 80 CAPSULE, EXTENDED RELEASE ORAL at 17:39

## 2023-10-12 RX ADMIN — OXYCODONE HYDROCHLORIDE 10 MILLIGRAM(S): 5 TABLET ORAL at 04:01

## 2023-10-12 RX ADMIN — HYDROMORPHONE HYDROCHLORIDE 0.5 MILLIGRAM(S): 2 INJECTION INTRAMUSCULAR; INTRAVENOUS; SUBCUTANEOUS at 05:19

## 2023-10-12 RX ADMIN — HYDROMORPHONE HYDROCHLORIDE 0.5 MILLIGRAM(S): 2 INJECTION INTRAMUSCULAR; INTRAVENOUS; SUBCUTANEOUS at 05:49

## 2023-10-12 RX ADMIN — CARVEDILOL PHOSPHATE 25 MILLIGRAM(S): 80 CAPSULE, EXTENDED RELEASE ORAL at 05:19

## 2023-10-12 RX ADMIN — OXYCODONE HYDROCHLORIDE 10 MILLIGRAM(S): 5 TABLET ORAL at 04:31

## 2023-10-12 RX ADMIN — Medication 1000 MILLIGRAM(S): at 18:20

## 2023-10-12 RX ADMIN — ATORVASTATIN CALCIUM 40 MILLIGRAM(S): 80 TABLET, FILM COATED ORAL at 21:54

## 2023-10-12 RX ADMIN — ONDANSETRON 4 MILLIGRAM(S): 8 TABLET, FILM COATED ORAL at 18:06

## 2023-10-12 RX ADMIN — Medication 1000 MILLIGRAM(S): at 17:39

## 2023-10-12 RX ADMIN — OXYCODONE HYDROCHLORIDE 10 MILLIGRAM(S): 5 TABLET ORAL at 10:38

## 2023-10-12 RX ADMIN — ENOXAPARIN SODIUM 40 MILLIGRAM(S): 100 INJECTION SUBCUTANEOUS at 17:39

## 2023-10-12 RX ADMIN — OXYCODONE HYDROCHLORIDE 10 MILLIGRAM(S): 5 TABLET ORAL at 11:30

## 2023-10-12 RX ADMIN — Medication 1 TABLET(S): at 10:38

## 2023-10-12 RX ADMIN — LISINOPRIL 5 MILLIGRAM(S): 2.5 TABLET ORAL at 05:19

## 2023-10-12 NOTE — PROGRESS NOTE ADULT - SUBJECTIVE AND OBJECTIVE BOX
TEAM [ D ] Surgery Daily Progress Note  =====================================================    SUBJECTIVE: Patient seen and examined at bedside on AM rounds. Patient reports that they're feeling well. Tolerating diet, denies nausea, vomiting.  +  Flatus/ + BM yesterday. OOB/Amublating as tolerated. Denies fever, chills.    ALLERGIES:  No Known Allergies    --------------------------------------------------------------------------------------    VITAL SIGNS:  T(C): 36.4 (10-12-23 @ 04:42), Max: 37.4 (10-11-23 @ 19:37)  HR: 95 (10-12-23 @ 04:42) (95 - 110)  BP: 107/58 (10-12-23 @ 04:42) (105/57 - 129/56)  BP(mean): --  ABP: --  ABP(mean): --  RR: 18 (10-12-23 @ 04:42) (16 - 18)  SpO2: 99% (10-12-23 @ 04:42) (94% - 99%)  Wt(kg): --  CVP(mm Hg): --  CI: --  CAPILLARY BLOOD GLUCOSE       N/A      10-11 @ 07:01  -  10-12 @ 07:00  --------------------------------------------------------  IN:    Oral Fluid: 380 mL    sodium chloride 0.9%: 600 mL  Total IN: 980 mL    OUT:    Voided (mL): 850 mL  Total OUT: 850 mL    Total NET: 130 mL        --------------------------------------------------------------------------------------    EXAM    General: NAD, resting in bed comfortably.  Cardiac: regular rate, warm and well perfused  Respiratory: Nonlabored respirations, normal cw expansion.  Abdomen: soft, nontender, nondistended. incision is c/d/i   Extremities: normal strength, FROM, no deformities    --------------------------------------------------------------------------------------    LABS                    CBC (10-12 @ 05:57)                              9.7<L>                         7.50    )----------------(  213        --    % Neutrophils, --    % Lymphocytes, ANC: --                                  29.5<L>  CBC (10-11 @ 06:13)                              9.2<L>                         7.39    )----------------(  210        --    % Neutrophils, --    % Lymphocytes, ANC: --                                  28.1<L>    BMP (10-12 @ 05:57)             131<L>  |  97<L>   |  7     		Ca++ --      Ca 8.0<L>             ---------------------------------( 121<H>		Mg 1.80               3.8     |  25      |  0.75  			Ph 2.7     BMP (10-11 @ 06:13)             135     |  101     |  7     		Ca++ --      Ca 8.3<L>             ---------------------------------( 107<H>		Mg 2.00               3.9     |  25      |  0.73  			Ph 2.4<L>              Urinalysis (10-12 @ 05:57):     Color:  / Appearance:  / SG:  / pH:  / Gluc: 121<H> / Ketones:  / Bili:  / Urobili:  / Protein : / Nitrites:  / Leuk.Est:  / RBC:  / WBC:  / Sq Epi:  / Non Sq Epi:  / Bacteria          Assessment and Plan:   · Assessment	  73y male patient S/P ex lap w/ omentectomy and repair of serosa for secondary malignant neoplasm of the retroperitoneum. Planned HIPEC and further debulking abandoned due to patient cardiac arrhythmias and need for pressors in OR. Course c/b ventricular arrhythmia.    PLAN:  - Pain control with PO oxycodone  - Diet: CLD  - Cardiology following: recommending when able, restart home Carvedilol and Lisinopril, and Aspirin. Change Simvastatin to Atorvastatin. **Consider change Warfarin to DOAC.   - EP: ICD interrogated   - DVT ppx: SCD's & lovenox    D Team Surgery  [id number]

## 2023-10-12 NOTE — CHART NOTE - NSCHARTNOTEFT_GEN_A_CORE
MD called to bedside after patient had episode of small volume emesis. Patient seen and examined with Dr. Whipple. Patient reports one episode of watery emesis. + Flatus, last BM this afternoon. He denies current nausea, abdominal pain, chest pain, SOB. On exam, patient afebrile, normotensive, HR 89, diaphoretic. Abdominal is soft, distended, nontender. Given resolution of N/V, physical exam, and intact bowel function, no further interventions at this time. Will continue to monitor and consider abdominal XR if further emesis occurs.      Surgery D Team  [id number]

## 2023-10-12 NOTE — PROGRESS NOTE ADULT - ATTENDING COMMENTS
The patient was seen and examined with the Cardiology Consultation Teaching Service.     No overnight events    No chest pain  No dyspnea, orthopnea or PND  No palpitations or dizziness     Tolerating PO, meds are being restarted    PMH/PSH:  Coronary artery disease  Percutaneous coronary intervention to the LAD and OM in 2008, RCA in 2018  Chronic heart failure with reduced LV function, LVEF 46%  ICD implantation  Atrial fibrillation    Comfortable-appearing man in no acute distress  Alert and oriented  Afebrile  Vital signs stable  JVP is not elevated  Clear lungs  Normal heart sounds  Extremities are warm and perfused  No peripheral edema     Normocytic anemia Hb 9.7  GFR 95    hs-troponin peak 266  CK-MB peak 19.4    Telemetry reveals continued PVCs, occasional ventricular bigeminy    Echocardiography demonstrates mildly reduced LV systolic function, LVEF 45-50-%, without significant valve disease; this is similar or better than prior studies    Impression and Recommendations:   77-year-old man with coronary artery disease, prior PCI, chronic heart failure with recovered LV function with ICD implantation, and atrial fibrillation who is now s/p exploratory laparotomy and omentectomy, noted to have frequent PVCs, a run of NSVT, and an elevated hs-troponin.     The patient is asymptomatic from his underlying cardiac illness. The patient is starting to tolerate his outpatient oral medications.     Please note that none of the PVCs or NSVT seen on telemetry require the patient to remain admitted in the hospital. The patient has known chronic heart failure with recovered LV function and has a working ICD.     The patient should be maintained on his outpatient medications including carvedilol and lisinopril. Given his known history of coronary disease, I would start atorvastatin rather than simvastatin.    Aspirin and warfarin can be restarted when it is felt to be safe from a surgical perspective, though aspirin should be prioritized given his multiple prior stents. It would also be reasonable to consider substituting a DOAC for his anticoagulation for atrial fibrillation, as the safety profile of these newer agents is superior to that of warfarin.      Tom Bai MD  Cardiology  [phone]

## 2023-10-12 NOTE — PROGRESS NOTE ADULT - SUBJECTIVE AND OBJECTIVE BOX
Patient seen and examined at bedside.    Overnight Events:   KARLA   Denies chest pain, palpitations, dizziness, syncope, and diaphoresis.   Able to take oral medications this AM.     REVIEW OF SYSTEMS:  as above.        Current Meds:  acetaminophen     Tablet .. 1000 milliGRAM(s) Oral every 6 hours  atorvastatin 40 milliGRAM(s) Oral at bedtime  carvedilol 25 milliGRAM(s) Oral every 12 hours  enoxaparin Injectable 40 milliGRAM(s) SubCutaneous every 24 hours  HYDROmorphone  Injectable 0.5 milliGRAM(s) IV Push every 3 hours PRN  influenza  Vaccine (HIGH DOSE) 0.7 milliLiter(s) IntraMuscular once  lidocaine   4% Patch 1 Patch Transdermal every 24 hours  lidocaine   4% Patch 1 Patch Transdermal every 24 hours  lisinopril 5 milliGRAM(s) Oral daily  naloxone Injectable 0.1 milliGRAM(s) IV Push every 3 minutes PRN  ondansetron Injectable 4 milliGRAM(s) IV Push every 6 hours PRN  oxyCODONE    IR 5 milliGRAM(s) Oral every 3 hours PRN  oxyCODONE    IR 10 milliGRAM(s) Oral every 4 hours PRN  sodium chloride 0.9% lock flush 10 milliLiter(s) IV Push every 1 hour PRN  tamsulosin 0.4 milliGRAM(s) Oral at bedtime      Vitals:  T(F): 98.3 (10-12), Max: 99.3 (10-11)  HR: 97 (10-12) (95 - 104)  BP: 106/63 (10-12) (105/57 - 129/56)  RR: 18 (10-12)  SpO2: 95% (10-12)  I&O's Summary    11 Oct 2023 07:01  -  12 Oct 2023 07:00  --------------------------------------------------------  IN: 980 mL / OUT: 850 mL / NET: 130 mL    12 Oct 2023 07:01  -  12 Oct 2023 17:17  --------------------------------------------------------  IN: 0 mL / OUT: 500 mL / NET: -500 mL        Physical Exam:  Appearance: No acute distress; well appearing  Cardiovascular: RRR with extra beats occasionally heard   Respiratory: Clear to auscultation bilaterally  Musculoskeletal: No clubbing; no joint deformity   Neurologic: Non-focal  Lymphatic: No lymphadenopathy  Psychiatry: AAOx3, mood & affect appropriate  Skin: No rashes, ecchymoses, or cyanosis                          9.7    7.50  )-----------( 213      ( 12 Oct 2023 05:57 )             29.5     10-12    131<L>  |  97<L>  |  7   ----------------------------<  121<H>  3.8   |  25  |  0.75    Ca    8.0<L>      12 Oct 2023 05:57  Phos  2.7     10-12  Mg     1.80     10-12            TTE 10/8  CONCLUSIONS:   1. Technically difficult image quality.   2. Left ventricular cavity is normally sized. Left ventricular wall thickness is normal. Left ventricular systolic function is mildly decreased with an ejection fraction visually estimated at 45 to 50 %. Global left ventricular hypokinesis.   3. The right ventricle is not well visualized.   4. Device lead is visualized in the right heart.   5. Trace mitral regurgitation.

## 2023-10-12 NOTE — PROGRESS NOTE ADULT - ASSESSMENT
72 yo man with LV systolic dysfunction s/p ICD (EF 45-50%), frequent PVCs, CAD s/p DESx3, and atrial fibrillation on AC admitted for ex lap and omenectomy for peritoneal neoplasm. Now POD3. Cardiology consulted for frequent PVCs and troponin elevation.     TTE from 9/22 abd 10/8 without any significant change.   Elevated troponin as previously noted is likely in the setting of post op hypotension for which the patient initially required pressors. Please stop trending troponin.     Please restart aspirin for prior PCI and increase atorvastatin to 80mg daily.     Telemetry notable for frequent PVCs and bigeminy. PVC burden likely to decrease now that he is back on home dose of carvedilol. Will continue to monitor on telemetry.   Given history of atrial fibrillation would recommend restarting AC with home warfarin or transitioning to a DOAC once able to from a surgical standpoint.     Please see attending attestation for final recommendations.     Thomas Patel MD  Cardiology Fellow

## 2023-10-13 LAB
ANION GAP SERPL CALC-SCNC: 11 MMOL/L — SIGNIFICANT CHANGE UP (ref 7–14)
BUN SERPL-MCNC: 16 MG/DL — SIGNIFICANT CHANGE UP (ref 7–23)
CALCIUM SERPL-MCNC: 8.6 MG/DL — SIGNIFICANT CHANGE UP (ref 8.4–10.5)
CHLORIDE SERPL-SCNC: 98 MMOL/L — SIGNIFICANT CHANGE UP (ref 98–107)
CO2 SERPL-SCNC: 25 MMOL/L — SIGNIFICANT CHANGE UP (ref 22–31)
CREAT SERPL-MCNC: 0.73 MG/DL — SIGNIFICANT CHANGE UP (ref 0.5–1.3)
EGFR: 95 ML/MIN/1.73M2 — SIGNIFICANT CHANGE UP
GLUCOSE SERPL-MCNC: 130 MG/DL — HIGH (ref 70–99)
HCT VFR BLD CALC: 30.1 % — LOW (ref 39–50)
HGB BLD-MCNC: 10.1 G/DL — LOW (ref 13–17)
MAGNESIUM SERPL-MCNC: 1.8 MG/DL — SIGNIFICANT CHANGE UP (ref 1.6–2.6)
MCHC RBC-ENTMCNC: 29.5 PG — SIGNIFICANT CHANGE UP (ref 27–34)
MCHC RBC-ENTMCNC: 33.6 GM/DL — SIGNIFICANT CHANGE UP (ref 32–36)
MCV RBC AUTO: 88 FL — SIGNIFICANT CHANGE UP (ref 80–100)
NRBC # BLD: 0 /100 WBCS — SIGNIFICANT CHANGE UP (ref 0–0)
NRBC # FLD: 0 K/UL — SIGNIFICANT CHANGE UP (ref 0–0)
PHOSPHATE SERPL-MCNC: 2.8 MG/DL — SIGNIFICANT CHANGE UP (ref 2.5–4.5)
PLATELET # BLD AUTO: 265 K/UL — SIGNIFICANT CHANGE UP (ref 150–400)
POTASSIUM SERPL-MCNC: 4.2 MMOL/L — SIGNIFICANT CHANGE UP (ref 3.5–5.3)
POTASSIUM SERPL-SCNC: 4.2 MMOL/L — SIGNIFICANT CHANGE UP (ref 3.5–5.3)
RBC # BLD: 3.42 M/UL — LOW (ref 4.2–5.8)
RBC # FLD: 14.3 % — SIGNIFICANT CHANGE UP (ref 10.3–14.5)
SODIUM SERPL-SCNC: 134 MMOL/L — LOW (ref 135–145)
WBC # BLD: 11.78 K/UL — HIGH (ref 3.8–10.5)
WBC # FLD AUTO: 11.78 K/UL — HIGH (ref 3.8–10.5)

## 2023-10-13 PROCEDURE — 99232 SBSQ HOSP IP/OBS MODERATE 35: CPT | Mod: GC

## 2023-10-13 PROCEDURE — 74177 CT ABD & PELVIS W/CONTRAST: CPT | Mod: 26

## 2023-10-13 PROCEDURE — 71045 X-RAY EXAM CHEST 1 VIEW: CPT | Mod: 26,76

## 2023-10-13 PROCEDURE — 74018 RADEX ABDOMEN 1 VIEW: CPT | Mod: 26

## 2023-10-13 RX ORDER — MAGNESIUM SULFATE 500 MG/ML
2 VIAL (ML) INJECTION ONCE
Refills: 0 | Status: COMPLETED | OUTPATIENT
Start: 2023-10-13 | End: 2023-10-13

## 2023-10-13 RX ORDER — SODIUM CHLORIDE 9 MG/ML
500 INJECTION, SOLUTION INTRAVENOUS ONCE
Refills: 0 | Status: COMPLETED | OUTPATIENT
Start: 2023-10-13 | End: 2023-10-13

## 2023-10-13 RX ORDER — SODIUM CHLORIDE 9 MG/ML
1000 INJECTION, SOLUTION INTRAVENOUS
Refills: 0 | Status: DISCONTINUED | OUTPATIENT
Start: 2023-10-13 | End: 2023-10-16

## 2023-10-13 RX ORDER — BENZOCAINE AND MENTHOL 5; 1 G/100ML; G/100ML
1 LIQUID ORAL ONCE
Refills: 0 | Status: COMPLETED | OUTPATIENT
Start: 2023-10-13 | End: 2023-10-13

## 2023-10-13 RX ADMIN — Medication 81 MILLIGRAM(S): at 10:26

## 2023-10-13 RX ADMIN — SODIUM CHLORIDE 100 MILLILITER(S): 9 INJECTION, SOLUTION INTRAVENOUS at 21:16

## 2023-10-13 RX ADMIN — Medication 1000 MILLIGRAM(S): at 10:26

## 2023-10-13 RX ADMIN — ONDANSETRON 4 MILLIGRAM(S): 8 TABLET, FILM COATED ORAL at 05:32

## 2023-10-13 RX ADMIN — ENOXAPARIN SODIUM 40 MILLIGRAM(S): 100 INJECTION SUBCUTANEOUS at 17:13

## 2023-10-13 RX ADMIN — CARVEDILOL PHOSPHATE 25 MILLIGRAM(S): 80 CAPSULE, EXTENDED RELEASE ORAL at 05:33

## 2023-10-13 RX ADMIN — SODIUM CHLORIDE 500 MILLILITER(S): 9 INJECTION, SOLUTION INTRAVENOUS at 12:19

## 2023-10-13 RX ADMIN — ATORVASTATIN CALCIUM 40 MILLIGRAM(S): 80 TABLET, FILM COATED ORAL at 21:15

## 2023-10-13 RX ADMIN — Medication 1000 MILLIGRAM(S): at 00:16

## 2023-10-13 RX ADMIN — TAMSULOSIN HYDROCHLORIDE 0.4 MILLIGRAM(S): 0.4 CAPSULE ORAL at 21:15

## 2023-10-13 RX ADMIN — SODIUM CHLORIDE 100 MILLILITER(S): 9 INJECTION, SOLUTION INTRAVENOUS at 11:29

## 2023-10-13 RX ADMIN — Medication 25 GRAM(S): at 10:25

## 2023-10-13 RX ADMIN — BENZOCAINE AND MENTHOL 1 LOZENGE: 5; 1 LIQUID ORAL at 21:15

## 2023-10-13 RX ADMIN — LISINOPRIL 5 MILLIGRAM(S): 2.5 TABLET ORAL at 05:33

## 2023-10-13 NOTE — PROGRESS NOTE ADULT - ASSESSMENT
74 yo man with LV systolic dysfunction s/p ICD (EF 45-50%), frequent PVCs, CAD s/p DESx3, and atrial fibrillation on AC admitted for ex lap and omenectomy for peritoneal neoplasm. Now POD3. Cardiology consulted for frequent PVCs and troponin elevation.     TTE from 9/22 abd 10/8 without any significant change.   Elevated troponin as previously noted is likely in the setting of post op hypotension for which the patient initially required pressors. Please stop trending troponin.     Continue aspirin and atorvastatin for prior PCI.     Telemetry notable for frequent PVCs and bigeminy. PVC burden likely to decrease now that he is back on home dose of carvedilol. Will continue to monitor on telemetry.   Given history of atrial fibrillation would recommend restarting AC with home warfarin or transitioning to a DOAC once able to from a surgical standpoint.     We will sign off on this patient.     Please see attending attestation for final recommendations.     Thomas Patel MD  Cardiology Fellow

## 2023-10-13 NOTE — PROGRESS NOTE ADULT - ATTENDING COMMENTS
The patient was seen and examined with the Cardiology Consultation Teaching Service.     No overnight events    No chest pain  No dyspnea, orthopnea or PND  No palpitations or dizziness     Burping continues  Decreased appetite but no abdominal pain  Tolerating oral medications    PMH/PSH:  Coronary artery disease  Percutaneous coronary intervention to the LAD and OM in 2008, RCA in 2018  Chronic heart failure with reduced LV function, LVEF 46%  ICD implantation  Atrial fibrillation    Comfortable-appearing man in no acute distress  Alert and oriented  Afebrile  Vital signs stable  JVP is not elevated  Clear lungs  Normal heart sounds  Extremities are warm and perfused  No peripheral edema     Leukocytosis 11.8  Normocytic anemia Hb 10.1  Hyponatremia 134  GFR 95    hs-troponin peak 266  CK-MB peak 19.4    Telemetry reveals continued PVCs, occasional ventricular bigeminy    Echocardiography demonstrates mildly reduced LV systolic function, LVEF 45-50-%, without significant valve disease; this is similar or better than prior studies    Impression and Recommendations:   77-year-old man with coronary artery disease, prior PCI, chronic heart failure with recovered LV function with ICD implantation, and atrial fibrillation who is now s/p exploratory laparotomy and omentectomy, noted to have frequent PVCs, a run of NSVT, and an elevated hs-troponin.     The patient is asymptomatic from his underlying cardiac illness. The patient is starting to tolerate his outpatient oral medications.     Please note that none of the PVCs or NSVT seen on telemetry require the patient to remain admitted in the hospital. The patient has known chronic heart failure with recovered LV function and has a working ICD.     The patient was restarted on his outpatient medications carvedilol and lisinopril, and switched to atorvastatin. His aspirin was also restarted.    The patient had previously discussed changing to a DOAC this with his outpatient cardiologist, but his physician preferred warfarin. Please reconsult cardiology when the patient is ready to be started on anticoagulation, and we will try to clarify this with his outpatient cardiologist.      If the patient is discharged prior to tolerating full anticoagulation, this decision can be deferred to his outpatient physician.    Tom Bai MD  Cardiology  [phone] The patient was seen and examined with the Cardiology Consultation Teaching Service.     No overnight events    No chest pain  No dyspnea, orthopnea or PND  No palpitations or dizziness     Burping continues  Decreased appetite but no abdominal pain  Tolerating oral medications    PMH/PSH:  Coronary artery disease  Percutaneous coronary intervention to the LAD and OM in 2008, RCA in 2018  Chronic heart failure with reduced LV function, LVEF 46%  ICD implantation  Atrial fibrillation    Comfortable-appearing man in no acute distress  Alert and oriented  Afebrile  Vital signs stable  JVP is not elevated  Clear lungs  Normal heart sounds  Extremities are warm and perfused  No peripheral edema     Leukocytosis 11.8  Normocytic anemia Hb 10.1  Hyponatremia 134  GFR 95    hs-troponin peak 266  CK-MB peak 19.4    Telemetry reveals continued PVCs, occasional ventricular bigeminy    Echocardiography demonstrates mildly reduced LV systolic function, LVEF 45-50-%, without significant valve disease; this is similar or better than prior studies    Impression and Recommendations:   77-year-old man with coronary artery disease, prior PCI, chronic heart failure with recovered LV function with ICD implantation, and atrial fibrillation who is now s/p exploratory laparotomy and omentectomy, noted to have frequent PVCs, a run of NSVT, and an elevated hs-troponin.     The patient is asymptomatic from his underlying cardiac illness. The patient is starting to tolerate his outpatient oral medications.     Please note that none of the PVCs or NSVT seen on telemetry require the patient to remain admitted in the hospital. The patient has known chronic heart failure with recovered LV function and has a working ICD.     The patient was restarted on his outpatient medications carvedilol and lisinopril, and switched to atorvastatin. His aspirin was also restarted.    The patient had previously discussed changing to a DOAC this with his outpatient cardiologist, but his physician preferred warfarin. Please reconsult cardiology when the patient is ready to be started on anticoagulation, and we will try to clarify this with his outpatient cardiologist.      If the patient is discharged prior to tolerating full anticoagulation, this decision can be deferred to his outpatient physician.    Tom Bai MD  Cardiology  [phone] The patient was seen and examined with the Cardiology Consultation Teaching Service.     No overnight events    No chest pain  No dyspnea, orthopnea or PND  No palpitations or dizziness     Burping continues  Decreased appetite but no abdominal pain  Tolerating oral medications    PMH/PSH:  Coronary artery disease  Percutaneous coronary intervention to the LAD and OM in 2008, RCA in 2018  Chronic heart failure with reduced LV function, LVEF 46%  ICD implantation  Atrial fibrillation    Comfortable-appearing man in no acute distress  Alert and oriented  Afebrile  Vital signs stable  JVP is not elevated  Clear lungs  Normal heart sounds  Extremities are warm and perfused  No peripheral edema     Leukocytosis 11.8  Normocytic anemia Hb 10.1  Hyponatremia 134  GFR 95    hs-troponin peak 266  CK-MB peak 19.4    Telemetry reveals continued PVCs, occasional ventricular bigeminy    Echocardiography demonstrates mildly reduced LV systolic function, LVEF 45-50-%, without significant valve disease; this is similar or better than prior studies    Impression and Recommendations:   77-year-old man with coronary artery disease, prior PCI, chronic heart failure with recovered LV function with ICD implantation, and atrial fibrillation who is now s/p exploratory laparotomy and omentectomy, noted to have frequent PVCs, a run of NSVT, and an elevated hs-troponin.     The patient is asymptomatic from his underlying cardiac illness. The patient is starting to tolerate his outpatient oral medications.     Please note that none of the PVCs or NSVT seen on telemetry require the patient to remain admitted in the hospital. The patient has known chronic heart failure with recovered LV function and has a working ICD.     The patient was restarted on his outpatient medications carvedilol and lisinopril, and switched to atorvastatin. His aspirin was also restarted.    The patient had previously discussed changing to a DOAC this with his outpatient cardiologist, but his physician preferred warfarin. Please reconsult cardiology when the patient is ready to be started on anticoagulation, and we will try to clarify this with his outpatient cardiologist.      If the patient is discharged prior to tolerating full anticoagulation, this decision can be deferred to his outpatient physician.    Tom Bai MD  Cardiology  [phone]

## 2023-10-13 NOTE — PROGRESS NOTE ADULT - SUBJECTIVE AND OBJECTIVE BOX
TEAM [ D ] Surgery Daily Progress Note  =====================================================    SUBJECTIVE: Patient seen and examined at bedside on AM rounds. Patient reports that he had an episode of emesis overnight. Patient denies any other complaints at this time    ALLERGIES:  No Known Allergies    --------------------------------------------------------------------------------------    VITAL SIGNS:  T(C): 36.8 (10-13-23 @ 10:35), Max: 36.9 (10-12-23 @ 12:11)  HR: 102 (10-13-23 @ 10:35) (60 - 103)  BP: 94/56 (10-13-23 @ 10:35) (94/56 - 134/78)  BP(mean): --  ABP: 134/78 (10-12-23 @ 18:31) (134/78 - 134/78)  ABP(mean): --  RR: 18 (10-13-23 @ 10:35) (16 - 18)  SpO2: 98% (10-13-23 @ 10:35) (95% - 98%)  Wt(kg): --  CVP(mm Hg): --  CI: --  CAPILLARY BLOOD GLUCOSE       N/A      10-12 @ 07:01  -  10-13 @ 07:00  --------------------------------------------------------  IN:    Oral Fluid: 340 mL  Total IN: 340 mL    OUT:    Voided (mL): 700 mL  Total OUT: 700 mL    Total NET: -360 mL      10-13 @ 07:01  -  10-13 @ 11:02  --------------------------------------------------------  IN:  Total IN: 0 mL    OUT:    Voided (mL): 200 mL  Total OUT: 200 mL    Total NET: -200 mL  --------------------------------------------------------------------------------------    EXAM    General: NAD, resting in bed comfortably.  Cardiac: regular rate, warm and well perfused  Respiratory: Nonlabored respirations, normal cw expansion.  Abdomen: soft, nontender, softly distended. incision is c/d/i   Extremities: normal strength, FROM, no deformities    --------------------------------------------------------------------------------------    LABS   CBC (10-13 @ 06:49)                              10.1<L>                         11.78<H>  )----------------(  265        --    % Neutrophils, --    % Lymphocytes, ANC: --                                  30.1<L>  CBC (10-12 @ 05:57)                              9.7<L>                         7.50    )----------------(  213        --    % Neutrophils, --    % Lymphocytes, ANC: --                                  29.5<L>    BMP (10-13 @ 06:49)             134<L>  |  98      |  16    		Ca++ --      Ca 8.6                ---------------------------------( 130<H>		Mg 1.80               4.2     |  25      |  0.73  			Ph 2.8     BMP (10-12 @ 05:57)             131<L>  |  97<L>   |  7     		Ca++ --      Ca 8.0<L>             ---------------------------------( 121<H>		Mg 1.80               3.8     |  25      |  0.75  			Ph 2.7                 Urinalysis (10-13 @ 06:49):     Color:  / Appearance:  / SG:  / pH:  / Gluc: 130<H> / Ketones:  / Bili:  / Urobili:  / Protein : / Nitrites:  / Leuk.Est:  / RBC:  / WBC:  / Sq Epi:  / Non Sq Epi:  / Bacteria            Assessment and Plan:   · Assessment	  73y male patient S/P ex lap w/ omentectomy and repair of serosa for secondary malignant neoplasm of the retroperitoneum. Planned HIPEC and further debulking abandoned due to patient cardiac arrhythmias and need for pressors in OR. Course c/b ventricular arrhythmia.    PLAN:  - Pain control with PO oxycodone  - Diet: NPO x meds  - plan for CT abdomen today  - Cardiology following: recommending when able, restart home Carvedilol and Lisinopril, and Aspirin. Change Simvastatin to Atorvastatin. **Consider change Warfarin to DOAC.   - EP: ICD interrogated   - DVT ppx: SCD's & lovenox    D Team Surgery  [id number]

## 2023-10-13 NOTE — PROGRESS NOTE ADULT - SUBJECTIVE AND OBJECTIVE BOX
Patient seen and examined at bedside.    Overnight Events:   KARLA   Denies chest pain, SOB, palpitations, and dizziness.   Not able to tolerate solid food this AM. Complaining of persistent belching.     REVIEW OF SYSTEMS:  as above     Current Meds:  acetaminophen     Tablet .. 1000 milliGRAM(s) Oral every 6 hours  aspirin  chewable 81 milliGRAM(s) Oral daily  atorvastatin 40 milliGRAM(s) Oral at bedtime  carvedilol 25 milliGRAM(s) Oral every 12 hours  dextrose 5% + lactated ringers. 1000 milliLiter(s) IV Continuous <Continuous>  enoxaparin Injectable 40 milliGRAM(s) SubCutaneous every 24 hours  influenza  Vaccine (HIGH DOSE) 0.7 milliLiter(s) IntraMuscular once  lisinopril 5 milliGRAM(s) Oral daily  naloxone Injectable 0.1 milliGRAM(s) IV Push every 3 minutes PRN  ondansetron Injectable 4 milliGRAM(s) IV Push every 6 hours PRN  sodium chloride 0.9% lock flush 10 milliLiter(s) IV Push every 1 hour PRN  tamsulosin 0.4 milliGRAM(s) Oral at bedtime      Vitals:  T(F): 98.5 (10-13), Max: 98.6 (10-13)  HR: 96 (10-13) (60 - 103)  BP: 96/53 (10-13) (94/56 - 134/78)  RR: 18 (10-13)  SpO2: 95% (10-13)  I&O's Summary    12 Oct 2023 07:01  -  13 Oct 2023 07:00  --------------------------------------------------------  IN: 340 mL / OUT: 700 mL / NET: -360 mL    13 Oct 2023 07:01  -  13 Oct 2023 16:29  --------------------------------------------------------  IN: 0 mL / OUT: 200 mL / NET: -200 mL        Physical Exam:  Appearance: No acute distress; well appearing  Cardiovascular: RRR with extra beats occasionally heard   Respiratory: Clear to auscultation bilaterally  Musculoskeletal: No clubbing; no joint deformity   Neurologic: Non-focal  Lymphatic: No lymphadenopathy  Psychiatry: AAOx3, mood & affect appropriate  Skin: No rashes, ecchymoses, or cyanosis                          10.1   11.78 )-----------( 265      ( 13 Oct 2023 06:49 )             30.1     10-13    134<L>  |  98  |  16  ----------------------------<  130<H>  4.2   |  25  |  0.73    Ca    8.6      13 Oct 2023 06:49  Phos  2.8     10-13  Mg     1.80     10-13                    New ECG(s): Personally reviewed    Echo:    Stress Testing:     Cath:    New Imaging:    Interpretation of Telemetry:

## 2023-10-14 LAB
ANION GAP SERPL CALC-SCNC: 9 MMOL/L — SIGNIFICANT CHANGE UP (ref 7–14)
BUN SERPL-MCNC: 11 MG/DL — SIGNIFICANT CHANGE UP (ref 7–23)
CALCIUM SERPL-MCNC: 8 MG/DL — LOW (ref 8.4–10.5)
CHLORIDE SERPL-SCNC: 98 MMOL/L — SIGNIFICANT CHANGE UP (ref 98–107)
CO2 SERPL-SCNC: 27 MMOL/L — SIGNIFICANT CHANGE UP (ref 22–31)
CREAT SERPL-MCNC: 0.8 MG/DL — SIGNIFICANT CHANGE UP (ref 0.5–1.3)
EGFR: 93 ML/MIN/1.73M2 — SIGNIFICANT CHANGE UP
GLUCOSE SERPL-MCNC: 129 MG/DL — HIGH (ref 70–99)
HCT VFR BLD CALC: 27.7 % — LOW (ref 39–50)
HGB BLD-MCNC: 9 G/DL — LOW (ref 13–17)
MAGNESIUM SERPL-MCNC: 2 MG/DL — SIGNIFICANT CHANGE UP (ref 1.6–2.6)
MCHC RBC-ENTMCNC: 29 PG — SIGNIFICANT CHANGE UP (ref 27–34)
MCHC RBC-ENTMCNC: 32.5 GM/DL — SIGNIFICANT CHANGE UP (ref 32–36)
MCV RBC AUTO: 89.4 FL — SIGNIFICANT CHANGE UP (ref 80–100)
NRBC # BLD: 0 /100 WBCS — SIGNIFICANT CHANGE UP (ref 0–0)
NRBC # FLD: 0 K/UL — SIGNIFICANT CHANGE UP (ref 0–0)
PHOSPHATE SERPL-MCNC: 2 MG/DL — LOW (ref 2.5–4.5)
PLATELET # BLD AUTO: 259 K/UL — SIGNIFICANT CHANGE UP (ref 150–400)
POTASSIUM SERPL-MCNC: 3.7 MMOL/L — SIGNIFICANT CHANGE UP (ref 3.5–5.3)
POTASSIUM SERPL-SCNC: 3.7 MMOL/L — SIGNIFICANT CHANGE UP (ref 3.5–5.3)
RBC # BLD: 3.1 M/UL — LOW (ref 4.2–5.8)
RBC # FLD: 14.5 % — SIGNIFICANT CHANGE UP (ref 10.3–14.5)
SODIUM SERPL-SCNC: 134 MMOL/L — LOW (ref 135–145)
WBC # BLD: 6.78 K/UL — SIGNIFICANT CHANGE UP (ref 3.8–10.5)
WBC # FLD AUTO: 6.78 K/UL — SIGNIFICANT CHANGE UP (ref 3.8–10.5)

## 2023-10-14 RX ORDER — ACETAMINOPHEN 500 MG
1000 TABLET ORAL EVERY 6 HOURS
Refills: 0 | Status: COMPLETED | OUTPATIENT
Start: 2023-10-14 | End: 2023-10-14

## 2023-10-14 RX ORDER — POTASSIUM CHLORIDE 20 MEQ
10 PACKET (EA) ORAL
Refills: 0 | Status: COMPLETED | OUTPATIENT
Start: 2023-10-14 | End: 2023-10-14

## 2023-10-14 RX ORDER — LANOLIN ALCOHOL/MO/W.PET/CERES
3 CREAM (GRAM) TOPICAL ONCE
Refills: 0 | Status: COMPLETED | OUTPATIENT
Start: 2023-10-14 | End: 2023-10-14

## 2023-10-14 RX ORDER — LANOLIN ALCOHOL/MO/W.PET/CERES
3 CREAM (GRAM) TOPICAL AT BEDTIME
Refills: 0 | Status: DISCONTINUED | OUTPATIENT
Start: 2023-10-14 | End: 2023-10-18

## 2023-10-14 RX ADMIN — TAMSULOSIN HYDROCHLORIDE 0.4 MILLIGRAM(S): 0.4 CAPSULE ORAL at 22:26

## 2023-10-14 RX ADMIN — Medication 400 MILLIGRAM(S): at 23:33

## 2023-10-14 RX ADMIN — Medication 1000 MILLIGRAM(S): at 00:10

## 2023-10-14 RX ADMIN — Medication 100 MILLIEQUIVALENT(S): at 17:51

## 2023-10-14 RX ADMIN — Medication 100 MILLIEQUIVALENT(S): at 16:42

## 2023-10-14 RX ADMIN — ATORVASTATIN CALCIUM 40 MILLIGRAM(S): 80 TABLET, FILM COATED ORAL at 22:26

## 2023-10-14 RX ADMIN — Medication 81 MILLIGRAM(S): at 12:49

## 2023-10-14 RX ADMIN — LISINOPRIL 5 MILLIGRAM(S): 2.5 TABLET ORAL at 06:13

## 2023-10-14 RX ADMIN — Medication 400 MILLIGRAM(S): at 17:56

## 2023-10-14 RX ADMIN — Medication 3 MILLIGRAM(S): at 02:06

## 2023-10-14 RX ADMIN — SODIUM CHLORIDE 100 MILLILITER(S): 9 INJECTION, SOLUTION INTRAVENOUS at 07:42

## 2023-10-14 RX ADMIN — Medication 400 MILLIGRAM(S): at 06:12

## 2023-10-14 RX ADMIN — Medication 100 MILLIEQUIVALENT(S): at 19:15

## 2023-10-14 RX ADMIN — Medication 1000 MILLIGRAM(S): at 23:50

## 2023-10-14 RX ADMIN — CARVEDILOL PHOSPHATE 25 MILLIGRAM(S): 80 CAPSULE, EXTENDED RELEASE ORAL at 18:02

## 2023-10-14 RX ADMIN — Medication 1000 MILLIGRAM(S): at 06:12

## 2023-10-14 RX ADMIN — ENOXAPARIN SODIUM 40 MILLIGRAM(S): 100 INJECTION SUBCUTANEOUS at 17:55

## 2023-10-14 RX ADMIN — CARVEDILOL PHOSPHATE 25 MILLIGRAM(S): 80 CAPSULE, EXTENDED RELEASE ORAL at 06:13

## 2023-10-14 RX ADMIN — Medication 85 MILLIMOLE(S): at 15:49

## 2023-10-14 NOTE — PROGRESS NOTE ADULT - SUBJECTIVE AND OBJECTIVE BOX
Surgery Progress Note    S: Patient seen and examined. No acute events overnight. Reports tolerating diet without nausea, vomiting, passing flatus, having bowel movements, voiding without issues, have been ambulating and out of bed. Denies fever, chills, SOB, chest pain.     O:  Physical Exam:  Gen: Laying in bed, NAD  HEENT: atrumatic, EMOI  Resp: Unlabored breathing  Abd: soft, carlito-incisional tenderness, nondistended, no rebound or guarding. Drains serosanguinous   Ext: Moves 4 extremities spontaneously    Vital Signs Last 24 Hrs  T(C): 36.2 (14 Oct 2023 07:55), Max: 37 (13 Oct 2023 11:59)  T(F): 97.2 (14 Oct 2023 07:55), Max: 98.6 (13 Oct 2023 11:59)  HR: 88 (14 Oct 2023 07:55) (80 - 102)  BP: 114/61 (14 Oct 2023 07:55) (94/56 - 114/61)  BP(mean): --  RR: 19 (14 Oct 2023 07:55) (16 - 19)  SpO2: 97% (14 Oct 2023 07:55) (93% - 98%)    Parameters below as of 14 Oct 2023 07:55  Patient On (Oxygen Delivery Method): room air        I&O's Detail    13 Oct 2023 07:01  -  14 Oct 2023 07:00  --------------------------------------------------------  IN:    dextrose 5% + lactated ringers: 900 mL    IV PiggyBack: 100 mL  Total IN: 1000 mL    OUT:    Nasogastric/Oral tube (mL): 1000 mL    Oral Fluid: 0 mL    Voided (mL): 1075 mL  Total OUT: 2075 mL    Total NET: -1075 mL      14 Oct 2023 07:01  -  14 Oct 2023 10:03  --------------------------------------------------------  IN:    dextrose 5% + lactated ringers: 100 mL  Total IN: 100 mL    OUT:    Nasogastric/Oral tube (mL): 0 mL    Oral Fluid: 0 mL  Total OUT: 0 mL    Total NET: 100 mL                                9.0    6.78  )-----------( 259      ( 14 Oct 2023 04:50 )             27.7       10-14    134<L>  |  98  |  11  ----------------------------<  129<H>  3.7   |  27  |  0.80    Ca    8.0<L>      14 Oct 2023 04:50  Phos  2.0     10-14  Mg     2.00     10-14         Surgery Progress Note    S: Patient seen and examined. No acute events overnight. Reports no nausea, vomiting, is passing flatus but not yet having bowel movements, voiding without issues, have been ambulating and out of bed. Denies fever, chills, SOB, chest pain.     O:  Physical Exam:  Gen: Laying in bed, NAD  HEENT: atrumatic, EMOI, NGT appropriately placed with gastric appearing output  Resp: Unlabored breathing  Abd: soft, carlito-incisional tenderness, nondistended, no rebound or guarding.  Ext: Moves 4 extremities spontaneously    Vital Signs Last 24 Hrs  T(C): 36.2 (14 Oct 2023 07:55), Max: 37 (13 Oct 2023 11:59)  T(F): 97.2 (14 Oct 2023 07:55), Max: 98.6 (13 Oct 2023 11:59)  HR: 88 (14 Oct 2023 07:55) (80 - 102)  BP: 114/61 (14 Oct 2023 07:55) (94/56 - 114/61)  BP(mean): --  RR: 19 (14 Oct 2023 07:55) (16 - 19)  SpO2: 97% (14 Oct 2023 07:55) (93% - 98%)    Parameters below as of 14 Oct 2023 07:55  Patient On (Oxygen Delivery Method): room air        I&O's Detail    13 Oct 2023 07:01  -  14 Oct 2023 07:00  --------------------------------------------------------  IN:    dextrose 5% + lactated ringers: 900 mL    IV PiggyBack: 100 mL  Total IN: 1000 mL    OUT:    Nasogastric/Oral tube (mL): 1000 mL    Oral Fluid: 0 mL    Voided (mL): 1075 mL  Total OUT: 2075 mL    Total NET: -1075 mL      14 Oct 2023 07:01  -  14 Oct 2023 10:03  --------------------------------------------------------  IN:    dextrose 5% + lactated ringers: 100 mL  Total IN: 100 mL    OUT:    Nasogastric/Oral tube (mL): 0 mL    Oral Fluid: 0 mL  Total OUT: 0 mL    Total NET: 100 mL                                9.0    6.78  )-----------( 259      ( 14 Oct 2023 04:50 )             27.7       10-14    134<L>  |  98  |  11  ----------------------------<  129<H>  3.7   |  27  |  0.80    Ca    8.0<L>      14 Oct 2023 04:50  Phos  2.0     10-14  Mg     2.00     10-14

## 2023-10-14 NOTE — PROGRESS NOTE ADULT - ASSESSMENT
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 Planned HIPEC and further debulking abandoned due to patient cardiac arrhythmias and need for pressors in OR. Course c/b ventricular arrhythmia.    PLAN:  - Pain control with PO oxycodone  - Diet: NPO x meds  - plan for CT abdomen today  - Cardiology following: recommending when able, restart home Carvedilol and Lisinopril, and Aspirin. Change Simvastatin to Atorvastatin. **Consider change Warfarin to DOAC.   - EP: ICD interrogated   - DVT ppx: SCD's & lovenox    D Team Surgery  [id number]   73y male patient S/P ex lap w/ omentectomy and repair of serosa for secondary malignant neoplasm of the retroperitoneum. Planned HIPEC and further debulking abandoned due to patient cardiac arrhythmias and need for pressors in OR. Course c/b ventricular arrhythmia.    PLAN:  - Pain control with PO oxycodone  - Diet: NPO x meds  - Cardiology recommending when able, restart home Carvedilol and Lisinopril, and Aspirin. Change Simvastatin to Atorvastatin. **Consider change Warfarin to DOAC.   - Re-consult cardiology per daughter regarding fleeting diastolic blood pressure of 38, patient otherwise HDS  - DVT ppx: SCD's & lovenox    D Team Surgery  [id number]

## 2023-10-15 LAB
ALBUMIN SERPL ELPH-MCNC: 2.7 G/DL — LOW (ref 3.3–5)
ALP SERPL-CCNC: 169 U/L — HIGH (ref 40–120)
ALT FLD-CCNC: 26 U/L — SIGNIFICANT CHANGE UP (ref 4–41)
ANION GAP SERPL CALC-SCNC: 12 MMOL/L — SIGNIFICANT CHANGE UP (ref 7–14)
AST SERPL-CCNC: 23 U/L — SIGNIFICANT CHANGE UP (ref 4–40)
BILIRUB SERPL-MCNC: 1.5 MG/DL — HIGH (ref 0.2–1.2)
BUN SERPL-MCNC: 7 MG/DL — SIGNIFICANT CHANGE UP (ref 7–23)
CALCIUM SERPL-MCNC: 8.1 MG/DL — LOW (ref 8.4–10.5)
CHLORIDE SERPL-SCNC: 99 MMOL/L — SIGNIFICANT CHANGE UP (ref 98–107)
CO2 SERPL-SCNC: 24 MMOL/L — SIGNIFICANT CHANGE UP (ref 22–31)
CREAT SERPL-MCNC: 0.84 MG/DL — SIGNIFICANT CHANGE UP (ref 0.5–1.3)
EGFR: 92 ML/MIN/1.73M2 — SIGNIFICANT CHANGE UP
GLUCOSE SERPL-MCNC: 113 MG/DL — HIGH (ref 70–99)
HCT VFR BLD CALC: 28 % — LOW (ref 39–50)
HGB BLD-MCNC: 9.1 G/DL — LOW (ref 13–17)
MAGNESIUM SERPL-MCNC: 1.9 MG/DL — SIGNIFICANT CHANGE UP (ref 1.6–2.6)
MCHC RBC-ENTMCNC: 29.4 PG — SIGNIFICANT CHANGE UP (ref 27–34)
MCHC RBC-ENTMCNC: 32.5 GM/DL — SIGNIFICANT CHANGE UP (ref 32–36)
MCV RBC AUTO: 90.6 FL — SIGNIFICANT CHANGE UP (ref 80–100)
NRBC # BLD: 0 /100 WBCS — SIGNIFICANT CHANGE UP (ref 0–0)
NRBC # FLD: 0 K/UL — SIGNIFICANT CHANGE UP (ref 0–0)
PHOSPHATE SERPL-MCNC: 3.5 MG/DL — SIGNIFICANT CHANGE UP (ref 2.5–4.5)
PLATELET # BLD AUTO: 324 K/UL — SIGNIFICANT CHANGE UP (ref 150–400)
POTASSIUM SERPL-MCNC: 3.6 MMOL/L — SIGNIFICANT CHANGE UP (ref 3.5–5.3)
POTASSIUM SERPL-SCNC: 3.6 MMOL/L — SIGNIFICANT CHANGE UP (ref 3.5–5.3)
PROT SERPL-MCNC: 5.9 G/DL — LOW (ref 6–8.3)
RBC # BLD: 3.09 M/UL — LOW (ref 4.2–5.8)
RBC # FLD: 14.7 % — HIGH (ref 10.3–14.5)
SODIUM SERPL-SCNC: 135 MMOL/L — SIGNIFICANT CHANGE UP (ref 135–145)
WBC # BLD: 7.51 K/UL — SIGNIFICANT CHANGE UP (ref 3.8–10.5)
WBC # FLD AUTO: 7.51 K/UL — SIGNIFICANT CHANGE UP (ref 3.8–10.5)

## 2023-10-15 RX ORDER — MAGNESIUM SULFATE 500 MG/ML
1 VIAL (ML) INJECTION ONCE
Refills: 0 | Status: COMPLETED | OUTPATIENT
Start: 2023-10-15 | End: 2023-10-15

## 2023-10-15 RX ORDER — POTASSIUM CHLORIDE 20 MEQ
10 PACKET (EA) ORAL ONCE
Refills: 0 | Status: COMPLETED | OUTPATIENT
Start: 2023-10-15 | End: 2023-10-15

## 2023-10-15 RX ADMIN — CARVEDILOL PHOSPHATE 25 MILLIGRAM(S): 80 CAPSULE, EXTENDED RELEASE ORAL at 05:22

## 2023-10-15 RX ADMIN — TAMSULOSIN HYDROCHLORIDE 0.4 MILLIGRAM(S): 0.4 CAPSULE ORAL at 21:29

## 2023-10-15 RX ADMIN — Medication 81 MILLIGRAM(S): at 11:38

## 2023-10-15 RX ADMIN — Medication 100 GRAM(S): at 15:01

## 2023-10-15 RX ADMIN — CARVEDILOL PHOSPHATE 25 MILLIGRAM(S): 80 CAPSULE, EXTENDED RELEASE ORAL at 18:18

## 2023-10-15 RX ADMIN — Medication 100 MILLIEQUIVALENT(S): at 15:01

## 2023-10-15 RX ADMIN — ATORVASTATIN CALCIUM 40 MILLIGRAM(S): 80 TABLET, FILM COATED ORAL at 21:28

## 2023-10-15 RX ADMIN — LISINOPRIL 5 MILLIGRAM(S): 2.5 TABLET ORAL at 05:22

## 2023-10-15 NOTE — PROGRESS NOTE ADULT - ASSESSMENT
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 Planned HIPEC and further debulking abandoned due to patient cardiac arrhythmias and need for pressors in OR. Course c/b ventricular arrhythmia. Patient had an NG tube placed on 10/13. Plan for clamp trial. If patient tolerates, may DC NG tube and advance to [facility].    PLAN:  - Pain control with PO oxycodone  - Diet: NPO x meds  - 4 hour NG tube clamp trial  - Cardiology recommending when able, restart home Carvedilol and Lisinopril, and Aspirin. Change Simvastatin to Atorvastatin. **Consider change Warfarin to DOAC.   - DVT ppx: SCD's & lovenox    D Team Surgery  [id number]

## 2023-10-15 NOTE — PROGRESS NOTE ADULT - SUBJECTIVE AND OBJECTIVE BOX
TEAM [ ** ] Surgery Daily Progress Note  =====================================================    SUBJECTIVE: Patient seen and examined at bedside on AM rounds. Patient reports that they're feeling well. Denies fever, chills, N/V, chest pain, SOB    ALLERGIES:  No Known Allergies      --------------------------------------------------------------------------------------    MEDICATIONS:    Neurologic Medications  melatonin 3 milliGRAM(s) Oral at bedtime PRN Insomnia  ondansetron Injectable 4 milliGRAM(s) IV Push every 6 hours PRN Nausea    Respiratory Medications    Cardiovascular Medications  carvedilol 25 milliGRAM(s) Oral every 12 hours  lisinopril 5 milliGRAM(s) Oral daily    Gastrointestinal Medications  dextrose 5% + lactated ringers. 1000 milliLiter(s) IV Continuous <Continuous>  sodium chloride 0.9% lock flush 10 milliLiter(s) IV Push every 1 hour PRN Pre/post blood products, medications, blood draw, and to maintain line patency    Genitourinary Medications  tamsulosin 0.4 milliGRAM(s) Oral at bedtime    Hematologic/Oncologic Medications  aspirin  chewable 81 milliGRAM(s) Oral daily  enoxaparin Injectable 40 milliGRAM(s) SubCutaneous every 24 hours  influenza  Vaccine (HIGH DOSE) 0.7 milliLiter(s) IntraMuscular once    Antimicrobial/Immunologic Medications    Endocrine/Metabolic Medications  atorvastatin 40 milliGRAM(s) Oral at bedtime    Topical/Other Medications  naloxone Injectable 0.1 milliGRAM(s) IV Push every 3 minutes PRN For ANY of the following changes in patient status:  A. RR LESS THAN 10 breaths per minute, B. Oxygen saturation LESS THAN 90%, C. Sedation score of 6    --------------------------------------------------------------------------------------    VITAL SIGNS:  T(C): 36.8 (10-15-23 @ 17:30), Max: 36.9 (10-15-23 @ 04:40)  HR: 103 (10-15-23 @ 17:30) (81 - 103)  BP: 125/81 (10-15-23 @ 17:30) (108/58 - 125/81)  RR: 18 (10-15-23 @ 17:30) (18 - 18)  SpO2: 100% (10-15-23 @ 17:30) (97% - 100%)  --------------------------------------------------------------------------------------    INS AND OUTS:    10-14-23 @ 07:01  -  10-15-23 @ 07:00  --------------------------------------------------------  IN: 2280 mL / OUT: 1875 mL / NET: 405 mL    10-15-23 @ 07:01  -  10-15-23 @ 20:19  --------------------------------------------------------  IN: 50 mL / OUT: 650 mL / NET: -600 mL      --------------------------------------------------------------------------------------      EXAM    General: NAD, resting in bed comfortably.  Cardiac: regular rate, warm and well perfused  Respiratory: Nonlabored respirations, normal cw expansion.  Abdomen: soft, nontender, nondistended. ___ incision is c/d/i, ostomy, NGALFONZO, luis.   Extremities: normal strength, FROM, no deformities    --------------------------------------------------------------------------------------    LABS       TEAM D Surgery Daily Progress Note  =====================================================    SUBJECTIVE: Patient seen and examined at bedside on AM rounds. Patient reports that they're feeling well and he is no longer having nausea, but the NG tube is uncomfortable. Patient reports passing gas and bowel movements. Denies fever, chills, N/V, chest pain, SOB.    ALLERGIES:  No Known Allergies      --------------------------------------------------------------------------------------    MEDICATIONS:    Neurologic Medications  melatonin 3 milliGRAM(s) Oral at bedtime PRN Insomnia  ondansetron Injectable 4 milliGRAM(s) IV Push every 6 hours PRN Nausea    Respiratory Medications    Cardiovascular Medications  carvedilol 25 milliGRAM(s) Oral every 12 hours  lisinopril 5 milliGRAM(s) Oral daily    Gastrointestinal Medications  dextrose 5% + lactated ringers. 1000 milliLiter(s) IV Continuous <Continuous>  sodium chloride 0.9% lock flush 10 milliLiter(s) IV Push every 1 hour PRN Pre/post blood products, medications, blood draw, and to maintain line patency    Genitourinary Medications  tamsulosin 0.4 milliGRAM(s) Oral at bedtime    Hematologic/Oncologic Medications  aspirin  chewable 81 milliGRAM(s) Oral daily  enoxaparin Injectable 40 milliGRAM(s) SubCutaneous every 24 hours  influenza  Vaccine (HIGH DOSE) 0.7 milliLiter(s) IntraMuscular once    Antimicrobial/Immunologic Medications    Endocrine/Metabolic Medications  atorvastatin 40 milliGRAM(s) Oral at bedtime    Topical/Other Medications  naloxone Injectable 0.1 milliGRAM(s) IV Push every 3 minutes PRN For ANY of the following changes in patient status:  A. RR LESS THAN 10 breaths per minute, B. Oxygen saturation LESS THAN 90%, C. Sedation score of 6    --------------------------------------------------------------------------------------    VITAL SIGNS:  T(C): 36.8 (10-15-23 @ 17:30), Max: 36.9 (10-15-23 @ 04:40)  HR: 103 (10-15-23 @ 17:30) (81 - 103)  BP: 125/81 (10-15-23 @ 17:30) (108/58 - 125/81)  RR: 18 (10-15-23 @ 17:30) (18 - 18)  SpO2: 100% (10-15-23 @ 17:30) (97% - 100%)  --------------------------------------------------------------------------------------    INS AND OUTS:    10-14-23 @ 07:01  -  10-15-23 @ 07:00  --------------------------------------------------------  IN: 2280 mL / OUT: 1875 mL / NET: 405 mL    10-15-23 @ 07:01  -  10-15-23 @ 20:19  --------------------------------------------------------  IN: 50 mL / OUT: 650 mL / NET: -600 mL      --------------------------------------------------------------------------------------      EXAM    General: NAD, resting in bed comfortably.  Cardiac: regular rate, warm and well perfused  Respiratory: Nonlabored respirations, normal cw expansion on RA  Abdomen: soft, nontender, nondistended.incision is c/d/i, NG tube   Extremities: normal strength, FROM, no deformities    --------------------------------------------------------------------------------------    LABS

## 2023-10-16 LAB
ANION GAP SERPL CALC-SCNC: 8 MMOL/L — SIGNIFICANT CHANGE UP (ref 7–14)
BUN SERPL-MCNC: 6 MG/DL — LOW (ref 7–23)
CALCIUM SERPL-MCNC: 8 MG/DL — LOW (ref 8.4–10.5)
CHLORIDE SERPL-SCNC: 104 MMOL/L — SIGNIFICANT CHANGE UP (ref 98–107)
CO2 SERPL-SCNC: 24 MMOL/L — SIGNIFICANT CHANGE UP (ref 22–31)
CREAT SERPL-MCNC: 0.81 MG/DL — SIGNIFICANT CHANGE UP (ref 0.5–1.3)
EGFR: 93 ML/MIN/1.73M2 — SIGNIFICANT CHANGE UP
GLUCOSE SERPL-MCNC: 103 MG/DL — HIGH (ref 70–99)
HCT VFR BLD CALC: 27.1 % — LOW (ref 39–50)
HGB BLD-MCNC: 8.9 G/DL — LOW (ref 13–17)
MAGNESIUM SERPL-MCNC: 2 MG/DL — SIGNIFICANT CHANGE UP (ref 1.6–2.6)
MCHC RBC-ENTMCNC: 29.3 PG — SIGNIFICANT CHANGE UP (ref 27–34)
MCHC RBC-ENTMCNC: 32.8 GM/DL — SIGNIFICANT CHANGE UP (ref 32–36)
MCV RBC AUTO: 89.1 FL — SIGNIFICANT CHANGE UP (ref 80–100)
NRBC # BLD: 0 /100 WBCS — SIGNIFICANT CHANGE UP (ref 0–0)
NRBC # FLD: 0.02 K/UL — HIGH (ref 0–0)
PHOSPHATE SERPL-MCNC: 3.4 MG/DL — SIGNIFICANT CHANGE UP (ref 2.5–4.5)
PLATELET # BLD AUTO: 369 K/UL — SIGNIFICANT CHANGE UP (ref 150–400)
POTASSIUM SERPL-MCNC: 4 MMOL/L — SIGNIFICANT CHANGE UP (ref 3.5–5.3)
POTASSIUM SERPL-SCNC: 4 MMOL/L — SIGNIFICANT CHANGE UP (ref 3.5–5.3)
RBC # BLD: 3.04 M/UL — LOW (ref 4.2–5.8)
RBC # FLD: 14.4 % — SIGNIFICANT CHANGE UP (ref 10.3–14.5)
SODIUM SERPL-SCNC: 136 MMOL/L — SIGNIFICANT CHANGE UP (ref 135–145)
WBC # BLD: 9.34 K/UL — SIGNIFICANT CHANGE UP (ref 3.8–10.5)
WBC # FLD AUTO: 9.34 K/UL — SIGNIFICANT CHANGE UP (ref 3.8–10.5)

## 2023-10-16 RX ORDER — RIVAROXABAN 15 MG-20MG
1 KIT ORAL
Qty: 1 | Refills: 0
Start: 2023-10-16 | End: 2023-11-05

## 2023-10-16 RX ORDER — ACETAMINOPHEN 500 MG
650 TABLET ORAL EVERY 6 HOURS
Refills: 0 | Status: DISCONTINUED | OUTPATIENT
Start: 2023-10-16 | End: 2023-10-18

## 2023-10-16 RX ORDER — APIXABAN 2.5 MG/1
1 TABLET, FILM COATED ORAL
Qty: 14 | Refills: 0
Start: 2023-10-16 | End: 2023-10-22

## 2023-10-16 RX ORDER — ASPIRIN/CALCIUM CARB/MAGNESIUM 324 MG
81 TABLET ORAL DAILY
Refills: 0 | Status: DISCONTINUED | OUTPATIENT
Start: 2023-10-16 | End: 2023-10-18

## 2023-10-16 RX ADMIN — ENOXAPARIN SODIUM 40 MILLIGRAM(S): 100 INJECTION SUBCUTANEOUS at 17:48

## 2023-10-16 RX ADMIN — Medication 81 MILLIGRAM(S): at 11:50

## 2023-10-16 RX ADMIN — TAMSULOSIN HYDROCHLORIDE 0.4 MILLIGRAM(S): 0.4 CAPSULE ORAL at 21:09

## 2023-10-16 RX ADMIN — LISINOPRIL 5 MILLIGRAM(S): 2.5 TABLET ORAL at 05:23

## 2023-10-16 RX ADMIN — CARVEDILOL PHOSPHATE 25 MILLIGRAM(S): 80 CAPSULE, EXTENDED RELEASE ORAL at 05:23

## 2023-10-16 RX ADMIN — ATORVASTATIN CALCIUM 40 MILLIGRAM(S): 80 TABLET, FILM COATED ORAL at 21:09

## 2023-10-16 NOTE — PROGRESS NOTE ADULT - SUBJECTIVE AND OBJECTIVE BOX
TEAM [ D ] Surgery Daily Progress Note  =====================================================    SUBJECTIVE: Patient seen and examined at bedside on AM rounds. Patient reports that they're feeling well. Patient passed NGT clamp trial yesterday. Patient is passing gas and having bowel movements. Denies fever, chills, N/V, chest pain, SOB    ALLERGIES:  No Known Allergies      --------------------------------------------------------------------------------------    MEDICATIONS:    Neurologic Medications  acetaminophen     Tablet .. 650 milliGRAM(s) Oral every 6 hours PRN Mild Pain (1 - 3)  melatonin 3 milliGRAM(s) Oral at bedtime PRN Insomnia  ondansetron Injectable 4 milliGRAM(s) IV Push every 6 hours PRN Nausea    Respiratory Medications    Cardiovascular Medications  carvedilol 25 milliGRAM(s) Oral every 12 hours  lisinopril 5 milliGRAM(s) Oral daily    Gastrointestinal Medications  dextrose 5% + lactated ringers. 1000 milliLiter(s) IV Continuous <Continuous>  sodium chloride 0.9% lock flush 10 milliLiter(s) IV Push every 1 hour PRN Pre/post blood products, medications, blood draw, and to maintain line patency    Genitourinary Medications  tamsulosin 0.4 milliGRAM(s) Oral at bedtime    Hematologic/Oncologic Medications  aspirin  chewable 81 milliGRAM(s) Oral daily  enoxaparin Injectable 40 milliGRAM(s) SubCutaneous every 24 hours  influenza  Vaccine (HIGH DOSE) 0.7 milliLiter(s) IntraMuscular once    Antimicrobial/Immunologic Medications    Endocrine/Metabolic Medications  atorvastatin 40 milliGRAM(s) Oral at bedtime    Topical/Other Medications  naloxone Injectable 0.1 milliGRAM(s) IV Push every 3 minutes PRN For ANY of the following changes in patient status:  A. RR LESS THAN 10 breaths per minute, B. Oxygen saturation LESS THAN 90%, C. Sedation score of 6    --------------------------------------------------------------------------------------    VITAL SIGNS:  T(C): 36.5 (10-16-23 @ 08:39), Max: 37.2 (10-16-23 @ 05:34)  HR: 88 (10-16-23 @ 08:39) (85 - 103)  BP: 111/51 (10-16-23 @ 08:39) (111/51 - 126/67)  RR: 18 (10-16-23 @ 08:39) (18 - 18)  SpO2: 96% (10-16-23 @ 08:39) (96% - 100%)  --------------------------------------------------------------------------------------    INS AND OUTS:    10-15-23 @ 07:01  -  10-16-23 @ 07:00  --------------------------------------------------------  IN: 100 mL / OUT: 1450 mL / NET: -1350 mL      --------------------------------------------------------------------------------------      EXAM    General: NAD, resting in bed comfortably.  Cardiac: regular rate, warm and well perfused  Respiratory: Nonlabored respirations, normal cw expansion.  Abdomen: soft, nontender, nondistended, midline incision with steristrips to inferior aspect of incision  Extremities: normal strength, FROM, no deformities    --------------------------------------------------------------------------------------    LABS                        8.9    9.34  )-----------( 369      ( 16 Oct 2023 05:30 )             27.1   10-16    136  |  104  |  6<L>  ----------------------------<  103<H>  4.0   |  24  |  0.81    Ca    8.0<L>      16 Oct 2023 05:30  Phos  3.4     10-16  Mg     2.00     10-16    TPro  5.9<L>  /  Alb  2.7<L>  /  TBili  1.5<H>  /  DBili  x   /  AST  23  /  ALT  26  /  AlkPhos  169<H>  10-15

## 2023-10-16 NOTE — PROGRESS NOTE ADULT - ASSESSMENT
72 yo M w/ extensive cardiac hx including HFrEF with AICD (EF 46%), CAD s/p stent 2008 and 2018 on aspirin, Afib on coumadin, dyslipidemia presents for scheduled surgery for malignant neoplasm of RP. S/p ex lap with omentectomy on 10/6, case aborted intraop 2/2 ventricular bigeminy    Plan  - Diet: clear liquids, advance to regular for dinner  - Home meds restarted  - Aspirin  - DVT prophylaxis  - PT recommends no skilled needs, dc with rolling walker    D team surgery 74 yo M w/ extensive cardiac hx including HFrEF with AICD (EF 46%), CAD s/p stent 2008 and 2018 on aspirin, Afib on coumadin, dyslipidemia presents for scheduled surgery for malignant neoplasm of RP. S/p ex lap with omentectomy on 10/6, case aborted intraop 2/2 ventricular bigeminy    Plan  - Diet: clear liquids, advance to regular for dinner  - Home meds restarted  - Aspirin  - DVT prophylaxis  - PT recommends no skilled needs, dc with rolling walker  - Per cardiology, will clarify DOAC vs warfarin on discharge    D team surgery

## 2023-10-17 LAB
ANION GAP SERPL CALC-SCNC: 11 MMOL/L — SIGNIFICANT CHANGE UP (ref 7–14)
APTT BLD: 28.6 SEC — SIGNIFICANT CHANGE UP (ref 24.5–35.6)
APTT BLD: 28.6 SEC — SIGNIFICANT CHANGE UP (ref 24.5–35.6)
BLD GP AB SCN SERPL QL: NEGATIVE — SIGNIFICANT CHANGE UP
BLOOD GAS ARTERIAL COMPREHENSIVE RESULT: SIGNIFICANT CHANGE UP
BLOOD GAS ARTERIAL COMPREHENSIVE RESULT: SIGNIFICANT CHANGE UP
BUN SERPL-MCNC: 7 MG/DL — SIGNIFICANT CHANGE UP (ref 7–23)
BUN SERPL-MCNC: 7 MG/DL — SIGNIFICANT CHANGE UP (ref 7–23)
BUN SERPL-MCNC: 9 MG/DL — SIGNIFICANT CHANGE UP (ref 7–23)
BUN SERPL-MCNC: 9 MG/DL — SIGNIFICANT CHANGE UP (ref 7–23)
CALCIUM SERPL-MCNC: 8.7 MG/DL — SIGNIFICANT CHANGE UP (ref 8.4–10.5)
CALCIUM SERPL-MCNC: 8.7 MG/DL — SIGNIFICANT CHANGE UP (ref 8.4–10.5)
CALCIUM SERPL-MCNC: 8.9 MG/DL — SIGNIFICANT CHANGE UP (ref 8.4–10.5)
CALCIUM SERPL-MCNC: 8.9 MG/DL — SIGNIFICANT CHANGE UP (ref 8.4–10.5)
CHLORIDE SERPL-SCNC: 102 MMOL/L — SIGNIFICANT CHANGE UP (ref 98–107)
CHLORIDE SERPL-SCNC: 102 MMOL/L — SIGNIFICANT CHANGE UP (ref 98–107)
CHLORIDE SERPL-SCNC: 99 MMOL/L — SIGNIFICANT CHANGE UP (ref 98–107)
CHLORIDE SERPL-SCNC: 99 MMOL/L — SIGNIFICANT CHANGE UP (ref 98–107)
CO2 SERPL-SCNC: 22 MMOL/L — SIGNIFICANT CHANGE UP (ref 22–31)
CO2 SERPL-SCNC: 22 MMOL/L — SIGNIFICANT CHANGE UP (ref 22–31)
CO2 SERPL-SCNC: 24 MMOL/L — SIGNIFICANT CHANGE UP (ref 22–31)
CO2 SERPL-SCNC: 24 MMOL/L — SIGNIFICANT CHANGE UP (ref 22–31)
CREAT SERPL-MCNC: 0.89 MG/DL — SIGNIFICANT CHANGE UP (ref 0.5–1.3)
CREAT SERPL-MCNC: 0.89 MG/DL — SIGNIFICANT CHANGE UP (ref 0.5–1.3)
CREAT SERPL-MCNC: 0.98 MG/DL — SIGNIFICANT CHANGE UP (ref 0.5–1.3)
CREAT SERPL-MCNC: 0.98 MG/DL — SIGNIFICANT CHANGE UP (ref 0.5–1.3)
EGFR: 81 ML/MIN/1.73M2 — SIGNIFICANT CHANGE UP
EGFR: 81 ML/MIN/1.73M2 — SIGNIFICANT CHANGE UP
EGFR: 90 ML/MIN/1.73M2 — SIGNIFICANT CHANGE UP
EGFR: 90 ML/MIN/1.73M2 — SIGNIFICANT CHANGE UP
GLUCOSE BLDC GLUCOMTR-MCNC: 154 MG/DL — HIGH (ref 70–99)
GLUCOSE BLDC GLUCOMTR-MCNC: 154 MG/DL — HIGH (ref 70–99)
GLUCOSE SERPL-MCNC: 114 MG/DL — HIGH (ref 70–99)
GLUCOSE SERPL-MCNC: 114 MG/DL — HIGH (ref 70–99)
GLUCOSE SERPL-MCNC: 140 MG/DL — HIGH (ref 70–99)
GLUCOSE SERPL-MCNC: 140 MG/DL — HIGH (ref 70–99)
HCT VFR BLD CALC: 32.9 % — LOW (ref 39–50)
HCT VFR BLD CALC: 32.9 % — LOW (ref 39–50)
HCT VFR BLD CALC: 34.5 % — LOW (ref 39–50)
HCT VFR BLD CALC: 34.5 % — LOW (ref 39–50)
HGB BLD-MCNC: 10.5 G/DL — LOW (ref 13–17)
HGB BLD-MCNC: 10.5 G/DL — LOW (ref 13–17)
HGB BLD-MCNC: 11.7 G/DL — LOW (ref 13–17)
HGB BLD-MCNC: 11.7 G/DL — LOW (ref 13–17)
INR BLD: 1.2 RATIO — HIGH (ref 0.85–1.18)
INR BLD: 1.2 RATIO — HIGH (ref 0.85–1.18)
LACTATE SERPL-SCNC: 1 MMOL/L — SIGNIFICANT CHANGE UP (ref 0.5–2)
LACTATE SERPL-SCNC: 1 MMOL/L — SIGNIFICANT CHANGE UP (ref 0.5–2)
MAGNESIUM SERPL-MCNC: 2.1 MG/DL — SIGNIFICANT CHANGE UP (ref 1.6–2.6)
MAGNESIUM SERPL-MCNC: 2.1 MG/DL — SIGNIFICANT CHANGE UP (ref 1.6–2.6)
MAGNESIUM SERPL-MCNC: 2.2 MG/DL — SIGNIFICANT CHANGE UP (ref 1.6–2.6)
MAGNESIUM SERPL-MCNC: 2.2 MG/DL — SIGNIFICANT CHANGE UP (ref 1.6–2.6)
MCHC RBC-ENTMCNC: 29.2 PG — SIGNIFICANT CHANGE UP (ref 27–34)
MCHC RBC-ENTMCNC: 29.2 PG — SIGNIFICANT CHANGE UP (ref 27–34)
MCHC RBC-ENTMCNC: 30 PG — SIGNIFICANT CHANGE UP (ref 27–34)
MCHC RBC-ENTMCNC: 30 PG — SIGNIFICANT CHANGE UP (ref 27–34)
MCHC RBC-ENTMCNC: 31.9 GM/DL — LOW (ref 32–36)
MCHC RBC-ENTMCNC: 31.9 GM/DL — LOW (ref 32–36)
MCHC RBC-ENTMCNC: 33.9 GM/DL — SIGNIFICANT CHANGE UP (ref 32–36)
MCHC RBC-ENTMCNC: 33.9 GM/DL — SIGNIFICANT CHANGE UP (ref 32–36)
MCV RBC AUTO: 88.5 FL — SIGNIFICANT CHANGE UP (ref 80–100)
MCV RBC AUTO: 88.5 FL — SIGNIFICANT CHANGE UP (ref 80–100)
MCV RBC AUTO: 91.4 FL — SIGNIFICANT CHANGE UP (ref 80–100)
MCV RBC AUTO: 91.4 FL — SIGNIFICANT CHANGE UP (ref 80–100)
NRBC # BLD: 0 /100 WBCS — SIGNIFICANT CHANGE UP (ref 0–0)
NRBC # FLD: 0 K/UL — SIGNIFICANT CHANGE UP (ref 0–0)
PHOSPHATE SERPL-MCNC: 3.4 MG/DL — SIGNIFICANT CHANGE UP (ref 2.5–4.5)
PHOSPHATE SERPL-MCNC: 3.4 MG/DL — SIGNIFICANT CHANGE UP (ref 2.5–4.5)
PHOSPHATE SERPL-MCNC: 3.7 MG/DL — SIGNIFICANT CHANGE UP (ref 2.5–4.5)
PHOSPHATE SERPL-MCNC: 3.7 MG/DL — SIGNIFICANT CHANGE UP (ref 2.5–4.5)
PLATELET # BLD AUTO: 503 K/UL — HIGH (ref 150–400)
PLATELET # BLD AUTO: 503 K/UL — HIGH (ref 150–400)
PLATELET # BLD AUTO: 534 K/UL — HIGH (ref 150–400)
PLATELET # BLD AUTO: 534 K/UL — HIGH (ref 150–400)
POTASSIUM SERPL-MCNC: 3.9 MMOL/L — SIGNIFICANT CHANGE UP (ref 3.5–5.3)
POTASSIUM SERPL-MCNC: 3.9 MMOL/L — SIGNIFICANT CHANGE UP (ref 3.5–5.3)
POTASSIUM SERPL-MCNC: 4.1 MMOL/L — SIGNIFICANT CHANGE UP (ref 3.5–5.3)
POTASSIUM SERPL-MCNC: 4.1 MMOL/L — SIGNIFICANT CHANGE UP (ref 3.5–5.3)
POTASSIUM SERPL-SCNC: 3.9 MMOL/L — SIGNIFICANT CHANGE UP (ref 3.5–5.3)
POTASSIUM SERPL-SCNC: 3.9 MMOL/L — SIGNIFICANT CHANGE UP (ref 3.5–5.3)
POTASSIUM SERPL-SCNC: 4.1 MMOL/L — SIGNIFICANT CHANGE UP (ref 3.5–5.3)
POTASSIUM SERPL-SCNC: 4.1 MMOL/L — SIGNIFICANT CHANGE UP (ref 3.5–5.3)
PROTHROM AB SERPL-ACNC: 13.5 SEC — HIGH (ref 9.5–13)
PROTHROM AB SERPL-ACNC: 13.5 SEC — HIGH (ref 9.5–13)
RBC # BLD: 3.6 M/UL — LOW (ref 4.2–5.8)
RBC # BLD: 3.6 M/UL — LOW (ref 4.2–5.8)
RBC # BLD: 3.9 M/UL — LOW (ref 4.2–5.8)
RBC # BLD: 3.9 M/UL — LOW (ref 4.2–5.8)
RBC # FLD: 14.3 % — SIGNIFICANT CHANGE UP (ref 10.3–14.5)
RH IG SCN BLD-IMP: POSITIVE — SIGNIFICANT CHANGE UP
SODIUM SERPL-SCNC: 134 MMOL/L — LOW (ref 135–145)
SODIUM SERPL-SCNC: 134 MMOL/L — LOW (ref 135–145)
SODIUM SERPL-SCNC: 135 MMOL/L — SIGNIFICANT CHANGE UP (ref 135–145)
SODIUM SERPL-SCNC: 135 MMOL/L — SIGNIFICANT CHANGE UP (ref 135–145)
TROPONIN T, HIGH SENSITIVITY RESULT: 47 NG/L — SIGNIFICANT CHANGE UP
TROPONIN T, HIGH SENSITIVITY RESULT: 47 NG/L — SIGNIFICANT CHANGE UP
WBC # BLD: 10.88 K/UL — HIGH (ref 3.8–10.5)
WBC # BLD: 10.88 K/UL — HIGH (ref 3.8–10.5)
WBC # BLD: 9.73 K/UL — SIGNIFICANT CHANGE UP (ref 3.8–10.5)
WBC # BLD: 9.73 K/UL — SIGNIFICANT CHANGE UP (ref 3.8–10.5)
WBC # FLD AUTO: 10.88 K/UL — HIGH (ref 3.8–10.5)
WBC # FLD AUTO: 10.88 K/UL — HIGH (ref 3.8–10.5)
WBC # FLD AUTO: 9.73 K/UL — SIGNIFICANT CHANGE UP (ref 3.8–10.5)
WBC # FLD AUTO: 9.73 K/UL — SIGNIFICANT CHANGE UP (ref 3.8–10.5)

## 2023-10-17 PROCEDURE — 93010 ELECTROCARDIOGRAM REPORT: CPT

## 2023-10-17 RX ORDER — SODIUM CHLORIDE 9 MG/ML
1000 INJECTION INTRAMUSCULAR; INTRAVENOUS; SUBCUTANEOUS ONCE
Refills: 0 | Status: COMPLETED | OUTPATIENT
Start: 2023-10-17 | End: 2023-10-17

## 2023-10-17 RX ADMIN — SODIUM CHLORIDE 1000 MILLILITER(S): 9 INJECTION INTRAMUSCULAR; INTRAVENOUS; SUBCUTANEOUS at 10:05

## 2023-10-17 RX ADMIN — LISINOPRIL 5 MILLIGRAM(S): 2.5 TABLET ORAL at 05:19

## 2023-10-17 RX ADMIN — TAMSULOSIN HYDROCHLORIDE 0.4 MILLIGRAM(S): 0.4 CAPSULE ORAL at 21:30

## 2023-10-17 RX ADMIN — ENOXAPARIN SODIUM 40 MILLIGRAM(S): 100 INJECTION SUBCUTANEOUS at 17:06

## 2023-10-17 RX ADMIN — Medication 81 MILLIGRAM(S): at 12:15

## 2023-10-17 RX ADMIN — ATORVASTATIN CALCIUM 40 MILLIGRAM(S): 80 TABLET, FILM COATED ORAL at 21:30

## 2023-10-17 RX ADMIN — CARVEDILOL PHOSPHATE 25 MILLIGRAM(S): 80 CAPSULE, EXTENDED RELEASE ORAL at 17:06

## 2023-10-17 RX ADMIN — CARVEDILOL PHOSPHATE 25 MILLIGRAM(S): 80 CAPSULE, EXTENDED RELEASE ORAL at 05:18

## 2023-10-17 NOTE — PROVIDER CONTACT NOTE (CHANGE IN STATUS NOTIFICATION) - RECOMMENDATIONS
Temp to be rechecked as per orders q4h or sooner. Patient to remain on continuous tele monitoring. Patient to rest in bed at this time. Repeat lactate ordered and type and screen ordered. Labs to be drawn STAT.

## 2023-10-17 NOTE — PROGRESS NOTE ADULT - ASSESSMENT
72 yo M w/ extensive cardiac hx including HFrEF with AICD (EF 46%), CAD s/p stent 2008 and 2018 on aspirin, Afib on coumadin, dyslipidemia presents for scheduled surgery for malignant neoplasm of RP. S/p ex lap with omentectomy on 10/6, case aborted intraop 2/2 ventricular bigeminy    Plan  - Diet: regular   - Home meds restarted  - Aspirin  - DVT prophylaxis  - PT recommends no skilled needs, dc with rolling walker  - Per cardiology, dc with coumadin 5 mg  - Discharge home after dinner    D team surgery [id number]

## 2023-10-17 NOTE — PROVIDER CONTACT NOTE (CHANGE IN STATUS NOTIFICATION) - BACKGROUND
Patient admitted for elective surgery to treat peritoneal cancer. Patient has an AICD, CAD w/ stent. Patient was a rapid response morning of 10/17 for vasovagal event resulting in diaphoresis and syncope without fall/injury.

## 2023-10-17 NOTE — PROVIDER CONTACT NOTE (CHANGE IN STATUS NOTIFICATION) - SITUATION
Patient hypothermic with oral temp in 95F range. Patient feeling cold despite extra blankets applied/thermostat adjustment. Vent bigeminy/frequent PVC rhythm noted on tele.

## 2023-10-17 NOTE — CONSULT NOTE ADULT - SUBJECTIVE AND OBJECTIVE BOX
SICU Consultation Note  =====================================================  HPI: 73 male with hx of HLD, BPH, CAD, afib, CHF, HTN, AICD, and Arteriosclerotic heart disease (ASHD) 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 Patient is s/p ex lap, omentectomy, large disease burden involving large colon and enterotomy repaired primarily on 10/6/23. Surgical rapid called for syncopal episode while patient was using the bathroom and hypotensive w/ SBP to the 80s.       PAST MEDICAL & SURGICAL HISTORY:  Dyslipidemia      Benign prostatic hypertrophy      CAD (coronary artery disease)      Atrial fibrillation      CHF (congestive heart failure)  2008 Tx with stent and ICD      HTN (hypertension)      Arteriosclerotic heart disease (ASHD)      History of pancreatitis      CAD (coronary artery disease)      History of secondary malignant neoplasm of retroperitoneum and peritoneum      AICD (automatic cardioverter/defibrillator) present      AICD (automatic cardioverter/defibrillator) present  battery changed 2014      History of coronary artery stent placement  in 2008 x 2 stents      Tibia/fibula fracture  left 2004      History of cholecystectomy  2014 complicated with septic shock      History of appendectomy  2018      H/O left inguinal hernia repair        Home Meds: Home Medications:  aspirin 81 mg oral tablet: 1 tab(s) orally once a day (06 Oct 2023 06:40)  iron supplements: take 1 tab orally once a day (06 Oct 2023 06:40)  multivitamin: take 1 tab orally once a day (06 Oct 2023 06:40)  vitamin D3: take 1 cap orally once a day (06 Oct 2023 06:40)  warfarin 5 mg oral tablet: 1 tab(s) orally once a day (06 Oct 2023 06:40)    Allergies: Allergies    No Known Allergies    Intolerances      Soc:   Advanced Directives: Presumed Full Code     ROS:    REVIEW OF SYSTEMS    [ ] A ten-point review of systems was otherwise negative except as noted.  [ ] Due to altered mental status/intubation, subjective information were not able to be obtained from the patient. History was obtained, to the extent possible, from review of the chart and collateral sources of information.      CURRENT MEDICATIONS:   --------------------------------------------------------------------------------------  Neurologic Medications  acetaminophen     Tablet .. 650 milliGRAM(s) Oral every 6 hours PRN Mild Pain (1 - 3)  melatonin 3 milliGRAM(s) Oral at bedtime PRN Insomnia    Respiratory Medications    Cardiovascular Medications  carvedilol 25 milliGRAM(s) Oral every 12 hours  lisinopril 5 milliGRAM(s) Oral daily    Gastrointestinal Medications  sodium chloride 0.9% Bolus 1000 milliLiter(s) IV Bolus once  sodium chloride 0.9% lock flush 10 milliLiter(s) IV Push every 1 hour PRN Pre/post blood products, medications, blood draw, and to maintain line patency    Genitourinary Medications  tamsulosin 0.4 milliGRAM(s) Oral at bedtime    Hematologic/Oncologic Medications  aspirin enteric coated 81 milliGRAM(s) Oral daily  enoxaparin Injectable 40 milliGRAM(s) SubCutaneous every 24 hours  influenza  Vaccine (HIGH DOSE) 0.7 milliLiter(s) IntraMuscular once    Antimicrobial/Immunologic Medications    Endocrine/Metabolic Medications  atorvastatin 40 milliGRAM(s) Oral at bedtime    Topical/Other Medications  naloxone Injectable 0.1 milliGRAM(s) IV Push every 3 minutes PRN For ANY of the following changes in patient status:  A. RR LESS THAN 10 breaths per minute, B. Oxygen saturation LESS THAN 90%, C. Sedation score of 6    --------------------------------------------------------------------------------------    VITAL SIGNS, INS/OUTS (last 24 hours):  --------------------------------------------------------------------------------------  ICU Vital Signs Last 24 Hrs  T(C): 35.4 (17 Oct 2023 10:00), Max: 37 (16 Oct 2023 20:32)  T(F): 95.7 (17 Oct 2023 10:00), Max: 98.6 (16 Oct 2023 20:32)  HR: 84 (17 Oct 2023 11:00) (60 - 94)  BP: 110/82 (17 Oct 2023 11:00) (98/56 - 131/55)  BP(mean): 91 (17 Oct 2023 11:00) (77 - 91)  ABP: --  ABP(mean): --  RR: 14 (17 Oct 2023 11:00) (14 - 18)  SpO2: 96% (17 Oct 2023 11:00) (95% - 98%)    O2 Parameters below as of 17 Oct 2023 10:00  Patient On (Oxygen Delivery Method): room air          I&O's Summary    16 Oct 2023 07:01  -  17 Oct 2023 07:00  --------------------------------------------------------  IN: 1200 mL / OUT: 1550 mL / NET: -350 mL    17 Oct 2023 07:01  -  17 Oct 2023 11:28  --------------------------------------------------------  IN: 1000 mL / OUT: 0 mL / NET: 1000 mL      --------------------------------------------------------------------------------------    EXAM:  General/Neuro  Exam: Normal, NAD, alert, oriented x 3, no focal deficits.     Respiratory  Exam: Lungs clear to auscultation, Normal expansion/effort. on RA    Cardiovascular  Exam: S1, S2.  Regular rate and rhythm.    Cardiac Rhythm: Normal Sinus Rhythm    GI  Exam: Abdomen soft, Non-tender, Non-distended.  Gastrostomy / Jejunostomy tube in place.  Nasogastric tube in place.  Colostomy / Ileostomy.  ***  Wound:  Midline incision C/D/I  Current Diet: regular    Extremities  Exam: Extremities warm, pink, well-perfused.        Tubes/Lines/Drains  ***  [x] Peripheral IV  [] Central Venous Line     	[] R	[] L	[] IJ	[] Fem	[] SC        Type:	    Date Placed:   [] Arterial Line		[] R	[] L	[] Fem	[] Rad	[] Ax	Date Placed:   [] PICC:         	[] Midline		[] Mediport           [] Urinary Catheter		Date Placed:     LABS  --------------------------------------------------------------------------------------  Labs:  CAPILLARY BLOOD GLUCOSE      POCT Blood Glucose.: 154 mg/dL (17 Oct 2023 09:25)                          10.5   10.88 )-----------( 534      ( 17 Oct 2023 09:35 )             32.9         10-17    135  |  102  |  9   ----------------------------<  140<H>  3.9   |  22  |  0.98      Calcium: 8.9 mg/dL (10-17-23 @ 09:35)      LFTs:     Blood Gas Arterial, Lactate: 2.4 mmol/L (10-17-23 @ 09:35)    ABG - ( 17 Oct 2023 09:35 )  pH: 7.46  /  pCO2: 31    /  pO2: 98    / HCO3: 22    / Base Excess: -1.1  /  SaO2: 98.0              Coags:     13.5   ----< 1.20    ( 17 Oct 2023 08:31 )     28.6                Urinalysis Basic - ( 17 Oct 2023 09:35 )    Color: x / Appearance: x / SG: x / pH: x  Gluc: 140 mg/dL / Ketone: x  / Bili: x / Urobili: x   Blood: x / Protein: x / Nitrite: x   Leuk Esterase: x / RBC: x / WBC x   Sq Epi: x / Non Sq Epi: x / Bacteria: x          --------------------------------------------------------------------------------------    OTHER LABS    IMAGING RESULTS         SICU Consultation Note  =====================================================  HPI: 73 male with hx of HLD, BPH, CAD, afib, CHF, HTN, AICD, and Arteriosclerotic heart disease (ASHD) 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 Patient is s/p ex lap with omentectomy on 10/6, case aborted intraop 2/2 bigeminy. Surgical rapid called for syncopal episode while patient was using the bathroom and hypotensive w/ SBP to the 80s.       PAST MEDICAL & SURGICAL HISTORY:  Dyslipidemia      Benign prostatic hypertrophy      CAD (coronary artery disease)      Atrial fibrillation      CHF (congestive heart failure)  2008 Tx with stent and ICD      HTN (hypertension)      Arteriosclerotic heart disease (ASHD)      History of pancreatitis      CAD (coronary artery disease)      History of secondary malignant neoplasm of retroperitoneum and peritoneum      AICD (automatic cardioverter/defibrillator) present      AICD (automatic cardioverter/defibrillator) present  battery changed 2014      History of coronary artery stent placement  in 2008 x 2 stents      Tibia/fibula fracture  left 2004      History of cholecystectomy  2014 complicated with septic shock      History of appendectomy  2018      H/O left inguinal hernia repair        Home Meds: Home Medications:  aspirin 81 mg oral tablet: 1 tab(s) orally once a day (06 Oct 2023 06:40)  iron supplements: take 1 tab orally once a day (06 Oct 2023 06:40)  multivitamin: take 1 tab orally once a day (06 Oct 2023 06:40)  vitamin D3: take 1 cap orally once a day (06 Oct 2023 06:40)  warfarin 5 mg oral tablet: 1 tab(s) orally once a day (06 Oct 2023 06:40)    Allergies: Allergies    No Known Allergies    Intolerances      Soc:   Advanced Directives: Presumed Full Code     ROS:    REVIEW OF SYSTEMS    [ ] A ten-point review of systems was otherwise negative except as noted.  [ ] Due to altered mental status/intubation, subjective information were not able to be obtained from the patient. History was obtained, to the extent possible, from review of the chart and collateral sources of information.      CURRENT MEDICATIONS:   --------------------------------------------------------------------------------------  Neurologic Medications  acetaminophen     Tablet .. 650 milliGRAM(s) Oral every 6 hours PRN Mild Pain (1 - 3)  melatonin 3 milliGRAM(s) Oral at bedtime PRN Insomnia    Respiratory Medications    Cardiovascular Medications  carvedilol 25 milliGRAM(s) Oral every 12 hours  lisinopril 5 milliGRAM(s) Oral daily    Gastrointestinal Medications  sodium chloride 0.9% Bolus 1000 milliLiter(s) IV Bolus once  sodium chloride 0.9% lock flush 10 milliLiter(s) IV Push every 1 hour PRN Pre/post blood products, medications, blood draw, and to maintain line patency    Genitourinary Medications  tamsulosin 0.4 milliGRAM(s) Oral at bedtime    Hematologic/Oncologic Medications  aspirin enteric coated 81 milliGRAM(s) Oral daily  enoxaparin Injectable 40 milliGRAM(s) SubCutaneous every 24 hours  influenza  Vaccine (HIGH DOSE) 0.7 milliLiter(s) IntraMuscular once    Antimicrobial/Immunologic Medications    Endocrine/Metabolic Medications  atorvastatin 40 milliGRAM(s) Oral at bedtime    Topical/Other Medications  naloxone Injectable 0.1 milliGRAM(s) IV Push every 3 minutes PRN For ANY of the following changes in patient status:  A. RR LESS THAN 10 breaths per minute, B. Oxygen saturation LESS THAN 90%, C. Sedation score of 6    --------------------------------------------------------------------------------------    VITAL SIGNS, INS/OUTS (last 24 hours):  --------------------------------------------------------------------------------------  ICU Vital Signs Last 24 Hrs  T(C): 35.4 (17 Oct 2023 10:00), Max: 37 (16 Oct 2023 20:32)  T(F): 95.7 (17 Oct 2023 10:00), Max: 98.6 (16 Oct 2023 20:32)  HR: 84 (17 Oct 2023 11:00) (60 - 94)  BP: 110/82 (17 Oct 2023 11:00) (98/56 - 131/55)  BP(mean): 91 (17 Oct 2023 11:00) (77 - 91)  ABP: --  ABP(mean): --  RR: 14 (17 Oct 2023 11:00) (14 - 18)  SpO2: 96% (17 Oct 2023 11:00) (95% - 98%)    O2 Parameters below as of 17 Oct 2023 10:00  Patient On (Oxygen Delivery Method): room air          I&O's Summary    16 Oct 2023 07:01  -  17 Oct 2023 07:00  --------------------------------------------------------  IN: 1200 mL / OUT: 1550 mL / NET: -350 mL    17 Oct 2023 07:01  -  17 Oct 2023 11:28  --------------------------------------------------------  IN: 1000 mL / OUT: 0 mL / NET: 1000 mL      --------------------------------------------------------------------------------------    EXAM:  General/Neuro  Exam: Normal, NAD, alert, oriented x 3, no focal deficits.     Respiratory  Exam: Lungs clear to auscultation, Normal expansion/effort. on RA    Cardiovascular  Exam: S1, S2.  Regular rate and rhythm.    Cardiac Rhythm: Normal Sinus Rhythm    GI  Exam: Abdomen soft, Non-tender, Non-distended.  Gastrostomy / Jejunostomy tube in place.  Nasogastric tube in place.  Colostomy / Ileostomy.  ***  Wound:  Midline incision C/D/I  Current Diet: regular    Extremities  Exam: Extremities warm, pink, well-perfused.        Tubes/Lines/Drains  ***  [x] Peripheral IV  [] Central Venous Line     	[] R	[] L	[] IJ	[] Fem	[] SC        Type:	    Date Placed:   [] Arterial Line		[] R	[] L	[] Fem	[] Rad	[] Ax	Date Placed:   [] PICC:         	[] Midline		[] Mediport           [] Urinary Catheter		Date Placed:     LABS  --------------------------------------------------------------------------------------  Labs:  CAPILLARY BLOOD GLUCOSE      POCT Blood Glucose.: 154 mg/dL (17 Oct 2023 09:25)                          10.5   10.88 )-----------( 534      ( 17 Oct 2023 09:35 )             32.9         10-17    135  |  102  |  9   ----------------------------<  140<H>  3.9   |  22  |  0.98      Calcium: 8.9 mg/dL (10-17-23 @ 09:35)      LFTs:     Blood Gas Arterial, Lactate: 2.4 mmol/L (10-17-23 @ 09:35)    ABG - ( 17 Oct 2023 09:35 )  pH: 7.46  /  pCO2: 31    /  pO2: 98    / HCO3: 22    / Base Excess: -1.1  /  SaO2: 98.0              Coags:     13.5   ----< 1.20    ( 17 Oct 2023 08:31 )     28.6                Urinalysis Basic - ( 17 Oct 2023 09:35 )    Color: x / Appearance: x / SG: x / pH: x  Gluc: 140 mg/dL / Ketone: x  / Bili: x / Urobili: x   Blood: x / Protein: x / Nitrite: x   Leuk Esterase: x / RBC: x / WBC x   Sq Epi: x / Non Sq Epi: x / Bacteria: x          --------------------------------------------------------------------------------------    OTHER LABS    IMAGING RESULTS

## 2023-10-17 NOTE — PROGRESS NOTE ADULT - ATTENDING COMMENTS
72 yo M w/ extensive cardiac hx including HFrEF with AICD (EF 46%), CAD s/p stent 2008 and 2018 on aspirin, Afib on coumadin, dyslipidemia presents for scheduled surgery for malignant neoplasm of RP. S/p ex lap with omentectomy on 10/6, case aborted intraop 2/2 ventricular bigeminy    Plan  - Diet: regular   - Home meds restarted  - Aspirin  - DVT prophylaxis  - PT recommends no skilled needs, dc with rolling walker  - Per cardiology, dc with coumadin 5 mg  - Discharge home after dinner    _________________________________________________________________    Transferred to SICU earlier today --    Seen in SICU.  Comfortable.  A&A x 3.  Feeling "much better"  No peritoneal signs  Appreciate SICU care  F/u Cards

## 2023-10-17 NOTE — CONSULT NOTE ADULT - ASSESSMENT
ASSESSMENT:  HPI: 73 male with hx of HLD, BPH, CAD, afib, CHF, HTN, AICD, and Arteriosclerotic heart disease (ASHD) 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 Patient is s/p ex lap, omentectomy, large disease burden involving large colon and enterotomy repaired primarily on 10/6/23. Surgical rapid called for syncopal episode while patient was using the bathroom and hypotensive w/ SBP to the 80s.     PLAN  NEUROLOGIC   - AAOx 3  - Pain control with tylenol    RESPIRATORY   - Currently on RA  - Monitor SpO2 goal >92%    CARDIOVASCULAR   - Monitor hemodynamics   - MAP goal > 65  - Home meds- statin, coreg, lisinopril, ASA    GASTROINTESTINAL   - Diet: Regular    /RENAL   - Flomax  - Monitor BMP  - Strict I/Os  - Monitor electrolytes, replete PRN    HEMATOLOGIC  - Monitor H/H   - DVT prophylaxis: SCDs vs lovenox    INFECTIOUS DISEASE  - Monitor fever/WC    ENDOCRINE  - Monitor gluc    LINES  - PIV    Disposition:   SICU  --------------------------------------------------------------------------------------    Critical Care Diagnoses:   ASSESSMENT:  HPI: 73 male with hx of HLD, BPH, CAD, afib, CHF, HTN, AICD, and Arteriosclerotic heart disease (ASHD) 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 Patient is s/p ex lap with omentectomy on 10/6, case aborted intraop 2/2 bigeminy. Surgical rapid called for syncopal episode while patient was using the bathroom and hypotensive w/ SBP to the 80s.     PLAN  NEUROLOGIC   - AAOx 3  - Pain control with tylenol    RESPIRATORY   - Currently on RA  - Monitor SpO2 goal >92%    CARDIOVASCULAR   - Monitor hemodynamics   - MAP goal > 65  - Home meds- statin, coreg, lisinopril, ASA  - Hx of bigeminy     GASTROINTESTINAL   - Diet: Regular    /RENAL   - Flomax  - Monitor BMP  - Strict I/Os  - Monitor electrolytes, replete PRN    HEMATOLOGIC  - Monitor H/H   - DVT prophylaxis: SCDs vs lovenox    INFECTIOUS DISEASE  - Monitor fever/WC    ENDOCRINE  - Monitor gluc    LINES  - PIV    Disposition:   SICU  --------------------------------------------------------------------------------------    Critical Care Diagnoses:

## 2023-10-17 NOTE — PROVIDER CONTACT NOTE (CHANGE IN STATUS NOTIFICATION) - ASSESSMENT
Patient reports feeling cold, but no shivering noted. Patient denies chest pain/palpitations. Patient doesn't feel lightheaded while resting in bed.

## 2023-10-17 NOTE — PROGRESS NOTE ADULT - SUBJECTIVE AND OBJECTIVE BOX
TEAM [ D ] Surgery Daily Progress Note  =====================================================    SUBJECTIVE: Patient seen and examined at bedside on AM rounds. Patient reports that they're feeling well. He is tolerating a clear liquid diet. He is passing gas but awaiting bowel movements. Denies fever, chills, N/V, chest pain, SOB    ALLERGIES:  No Known Allergies      --------------------------------------------------------------------------------------    MEDICATIONS:    Neurologic Medications  acetaminophen     Tablet .. 650 milliGRAM(s) Oral every 6 hours PRN Mild Pain (1 - 3)  melatonin 3 milliGRAM(s) Oral at bedtime PRN Insomnia    Respiratory Medications    Cardiovascular Medications  carvedilol 25 milliGRAM(s) Oral every 12 hours  lisinopril 5 milliGRAM(s) Oral daily    Gastrointestinal Medications  bisacodyl Suppository 10 milliGRAM(s) Rectal once  sodium chloride 0.9% lock flush 10 milliLiter(s) IV Push every 1 hour PRN Pre/post blood products, medications, blood draw, and to maintain line patency    Genitourinary Medications  tamsulosin 0.4 milliGRAM(s) Oral at bedtime    Hematologic/Oncologic Medications  aspirin enteric coated 81 milliGRAM(s) Oral daily  enoxaparin Injectable 40 milliGRAM(s) SubCutaneous every 24 hours  influenza  Vaccine (HIGH DOSE) 0.7 milliLiter(s) IntraMuscular once    Antimicrobial/Immunologic Medications    Endocrine/Metabolic Medications  atorvastatin 40 milliGRAM(s) Oral at bedtime    Topical/Other Medications  naloxone Injectable 0.1 milliGRAM(s) IV Push every 3 minutes PRN For ANY of the following changes in patient status:  A. RR LESS THAN 10 breaths per minute, B. Oxygen saturation LESS THAN 90%, C. Sedation score of 6    --------------------------------------------------------------------------------------    VITAL SIGNS:  T(C): 36.3 (10-17-23 @ 05:18), Max: 37 (10-16-23 @ 20:32)  HR: 92 (10-17-23 @ 05:18) (60 - 94)  BP: 111/52 (10-17-23 @ 05:18) (98/56 - 131/55)  RR: 17 (10-17-23 @ 05:18) (17 - 18)  SpO2: 96% (10-17-23 @ 05:18) (95% - 98%)  --------------------------------------------------------------------------------------    INS AND OUTS:    10-16-23 @ 07:01  -  10-17-23 @ 07:00  --------------------------------------------------------  IN: 1200 mL / OUT: 1550 mL / NET: -350 mL      --------------------------------------------------------------------------------------      EXAM    General: NAD, resting in bed comfortably.  Cardiac: regular rate, warm and well perfused  Respiratory: Nonlabored respirations, normal cw expansion.  Abdomen: soft, nontender, nondistended, midline incision c/d/i  Extremities: normal strength, FROM, no deformities    --------------------------------------------------------------------------------------    LABS                        8.9    9.34  )-----------( 369      ( 16 Oct 2023 05:30 )             27.1   10-16    136  |  104  |  6<L>  ----------------------------<  103<H>  4.0   |  24  |  0.81    Ca    8.0<L>      16 Oct 2023 05:30  Phos  3.4     10-16  Mg     2.00     10-16

## 2023-10-17 NOTE — CHART NOTE - NSCHARTNOTEFT_GEN_A_CORE
Surgical rapid response called for hypotension. Patient experienced a witness vasovagal episode. Upon arrival, patient was diaphoretic, bradycardic HR 40s, and hypotensive to SBP 50s. Patient briefly lost consciousness and awoke within 30 seconds. Patient then became alert and oriented to name, date, and location.   STAT labs were drawn including CBC, BMP, type and screen, coags, and ABG. EKG performed demonstrating ventricular bigeminy and PVCs.   Patient examined at bedside. He is without head trauma/lacerations, neurologically intact without focal deficits nor slurred speech, moving all extremities.     Attending called and decision made to transfer patient to SICU for hemodynamic monitoring in the setting of extensive cardiac history and hypotension 90s/50s MAP 61.   Daughter and family updated.    [place] [id number] Surgical rapid response called for hypotension. Patient experienced a witnessed vasovagal episode. Upon arrival, patient was diaphoretic, bradycardic HR 40s, and hypotensive to SBP 50s. Patient briefly lost consciousness, but awoke within 30 seconds. Patient then became alert and oriented to name, date, and location.   STAT labs were drawn including CBC, BMP, type and screen, coags, and ABG. EKG performed demonstrating ventricular bigeminy and PVCs.   Patient examined at bedside. He is without head trauma/lacerations, neurologically intact without focal deficits nor slurred speech, moving all extremities.     Attending called and decision made to transfer patient to SICU for hemodynamic monitoring in the setting of extensive cardiac history and hypotension 90s/50s MAP 61.   Daughter and family updated.    D [place] [id number]

## 2023-10-18 ENCOUNTER — TRANSCRIPTION ENCOUNTER (OUTPATIENT)
Age: 73
End: 2023-10-18

## 2023-10-18 VITALS
HEART RATE: 88 BPM | SYSTOLIC BLOOD PRESSURE: 113 MMHG | RESPIRATION RATE: 18 BRPM | OXYGEN SATURATION: 100 % | TEMPERATURE: 97 F | DIASTOLIC BLOOD PRESSURE: 62 MMHG

## 2023-10-18 LAB
ANION GAP SERPL CALC-SCNC: 11 MMOL/L — SIGNIFICANT CHANGE UP (ref 7–14)
ANION GAP SERPL CALC-SCNC: 11 MMOL/L — SIGNIFICANT CHANGE UP (ref 7–14)
APTT BLD: 29.5 SEC — SIGNIFICANT CHANGE UP (ref 24.5–35.6)
APTT BLD: 29.5 SEC — SIGNIFICANT CHANGE UP (ref 24.5–35.6)
BUN SERPL-MCNC: 11 MG/DL — SIGNIFICANT CHANGE UP (ref 7–23)
BUN SERPL-MCNC: 11 MG/DL — SIGNIFICANT CHANGE UP (ref 7–23)
CALCIUM SERPL-MCNC: 7.7 MG/DL — LOW (ref 8.4–10.5)
CALCIUM SERPL-MCNC: 7.7 MG/DL — LOW (ref 8.4–10.5)
CHLORIDE SERPL-SCNC: 102 MMOL/L — SIGNIFICANT CHANGE UP (ref 98–107)
CHLORIDE SERPL-SCNC: 102 MMOL/L — SIGNIFICANT CHANGE UP (ref 98–107)
CO2 SERPL-SCNC: 21 MMOL/L — LOW (ref 22–31)
CO2 SERPL-SCNC: 21 MMOL/L — LOW (ref 22–31)
CREAT SERPL-MCNC: 0.91 MG/DL — SIGNIFICANT CHANGE UP (ref 0.5–1.3)
CREAT SERPL-MCNC: 0.91 MG/DL — SIGNIFICANT CHANGE UP (ref 0.5–1.3)
EGFR: 88 ML/MIN/1.73M2 — SIGNIFICANT CHANGE UP
EGFR: 88 ML/MIN/1.73M2 — SIGNIFICANT CHANGE UP
GLUCOSE SERPL-MCNC: 108 MG/DL — HIGH (ref 70–99)
GLUCOSE SERPL-MCNC: 108 MG/DL — HIGH (ref 70–99)
HCT VFR BLD CALC: 28.4 % — LOW (ref 39–50)
HCT VFR BLD CALC: 28.4 % — LOW (ref 39–50)
HGB BLD-MCNC: 9.2 G/DL — LOW (ref 13–17)
HGB BLD-MCNC: 9.2 G/DL — LOW (ref 13–17)
INR BLD: 1.28 RATIO — HIGH (ref 0.85–1.18)
INR BLD: 1.28 RATIO — HIGH (ref 0.85–1.18)
MAGNESIUM SERPL-MCNC: 2 MG/DL — SIGNIFICANT CHANGE UP (ref 1.6–2.6)
MAGNESIUM SERPL-MCNC: 2 MG/DL — SIGNIFICANT CHANGE UP (ref 1.6–2.6)
MCHC RBC-ENTMCNC: 29.4 PG — SIGNIFICANT CHANGE UP (ref 27–34)
MCHC RBC-ENTMCNC: 29.4 PG — SIGNIFICANT CHANGE UP (ref 27–34)
MCHC RBC-ENTMCNC: 32.4 GM/DL — SIGNIFICANT CHANGE UP (ref 32–36)
MCHC RBC-ENTMCNC: 32.4 GM/DL — SIGNIFICANT CHANGE UP (ref 32–36)
MCV RBC AUTO: 90.7 FL — SIGNIFICANT CHANGE UP (ref 80–100)
MCV RBC AUTO: 90.7 FL — SIGNIFICANT CHANGE UP (ref 80–100)
NRBC # BLD: 0 /100 WBCS — SIGNIFICANT CHANGE UP (ref 0–0)
NRBC # BLD: 0 /100 WBCS — SIGNIFICANT CHANGE UP (ref 0–0)
NRBC # FLD: 0 K/UL — SIGNIFICANT CHANGE UP (ref 0–0)
NRBC # FLD: 0 K/UL — SIGNIFICANT CHANGE UP (ref 0–0)
PHOSPHATE SERPL-MCNC: 3 MG/DL — SIGNIFICANT CHANGE UP (ref 2.5–4.5)
PHOSPHATE SERPL-MCNC: 3 MG/DL — SIGNIFICANT CHANGE UP (ref 2.5–4.5)
PLATELET # BLD AUTO: 453 K/UL — HIGH (ref 150–400)
PLATELET # BLD AUTO: 453 K/UL — HIGH (ref 150–400)
POTASSIUM SERPL-MCNC: 3.7 MMOL/L — SIGNIFICANT CHANGE UP (ref 3.5–5.3)
POTASSIUM SERPL-MCNC: 3.7 MMOL/L — SIGNIFICANT CHANGE UP (ref 3.5–5.3)
POTASSIUM SERPL-SCNC: 3.7 MMOL/L — SIGNIFICANT CHANGE UP (ref 3.5–5.3)
POTASSIUM SERPL-SCNC: 3.7 MMOL/L — SIGNIFICANT CHANGE UP (ref 3.5–5.3)
PROTHROM AB SERPL-ACNC: 14.2 SEC — HIGH (ref 9.5–13)
PROTHROM AB SERPL-ACNC: 14.2 SEC — HIGH (ref 9.5–13)
RBC # BLD: 3.13 M/UL — LOW (ref 4.2–5.8)
RBC # BLD: 3.13 M/UL — LOW (ref 4.2–5.8)
RBC # FLD: 14.5 % — SIGNIFICANT CHANGE UP (ref 10.3–14.5)
RBC # FLD: 14.5 % — SIGNIFICANT CHANGE UP (ref 10.3–14.5)
SODIUM SERPL-SCNC: 134 MMOL/L — LOW (ref 135–145)
SODIUM SERPL-SCNC: 134 MMOL/L — LOW (ref 135–145)
WBC # BLD: 8.9 K/UL — SIGNIFICANT CHANGE UP (ref 3.8–10.5)
WBC # BLD: 8.9 K/UL — SIGNIFICANT CHANGE UP (ref 3.8–10.5)
WBC # FLD AUTO: 8.9 K/UL — SIGNIFICANT CHANGE UP (ref 3.8–10.5)
WBC # FLD AUTO: 8.9 K/UL — SIGNIFICANT CHANGE UP (ref 3.8–10.5)

## 2023-10-18 PROCEDURE — 93283 PRGRMG EVAL IMPLANTABLE DFB: CPT | Mod: 26

## 2023-10-18 RX ORDER — ACETAMINOPHEN 500 MG
2 TABLET ORAL
Qty: 0 | Refills: 0 | DISCHARGE
Start: 2023-10-18

## 2023-10-18 RX ORDER — POLYETHYLENE GLYCOL 3350 17 G/17G
17 POWDER, FOR SOLUTION ORAL
Qty: 0 | Refills: 0 | DISCHARGE

## 2023-10-18 RX ORDER — POTASSIUM CHLORIDE 20 MEQ
20 PACKET (EA) ORAL
Refills: 0 | Status: COMPLETED | OUTPATIENT
Start: 2023-10-18 | End: 2023-10-18

## 2023-10-18 RX ORDER — OXYCODONE HYDROCHLORIDE 5 MG/1
1 TABLET ORAL
Qty: 5 | Refills: 0
Start: 2023-10-18

## 2023-10-18 RX ADMIN — Medication 81 MILLIGRAM(S): at 12:43

## 2023-10-18 RX ADMIN — Medication 20 MILLIEQUIVALENT(S): at 05:22

## 2023-10-18 RX ADMIN — CARVEDILOL PHOSPHATE 25 MILLIGRAM(S): 80 CAPSULE, EXTENDED RELEASE ORAL at 05:22

## 2023-10-18 RX ADMIN — Medication 20 MILLIEQUIVALENT(S): at 08:24

## 2023-10-18 NOTE — PROGRESS NOTE ADULT - ASSESSMENT
72 yo M w/ extensive cardiac hx including HFrEF with AICD (EF 46%), CAD s/p stent 2008 and 2018 on aspirin, Afib on coumadin, dyslipidemia presents for scheduled surgery for malignant neoplasm of RP. S/p ex lap with omentectomy on 10/6, case aborted intraop 2/2 ventricular bigeminy. Patient transferred to SICU after vasovagal episode on 10/18    Plan  - Diet: regular   - Home meds restarted  - Aspirin  - DVT prophylaxis  - PT recommends no skilled needs, dc with rolling walker  - Per cardiology, dc with coumadin 5 mg  - Appreciate SICU care    D team surgery [id number]

## 2023-10-18 NOTE — DISCHARGE NOTE NURSING/CASE MANAGEMENT/SOCIAL WORK - PATIENT PORTAL LINK FT
You can access the FollowMyHealth Patient Portal offered by [facility] by registering at the following website: [email or web address]. By joining [employer]’s FollowMyHealth portal, you will also be able to view your health information using other applications (apps) compatible with our system.

## 2023-10-18 NOTE — DISCHARGE NOTE NURSING/CASE MANAGEMENT/SOCIAL WORK - NSDCPEFALRISK_GEN_ALL_CORE
For information on Fall & Injury Prevention, visit: [email or web address] OR  [email or web address] OR  https://www.cdc.gov/steadi/patient.html

## 2023-10-18 NOTE — PROCEDURE NOTE - ADDITIONAL PROCEDURE DETAILS
Dural chamber ICD in AAI <=> DDD mode   Normal sensing and pacing via iterative testing  Excellent threshold capture  No events recorded corresponding to this admission  No reprogramming  Tele reveals SR with ventricular bigeminy, continue BB and consider uptitrate for Ventricular ectopy if BP tolerates Dural chamber ICD in AAI <=> DDD mode   Normal sensing and pacing via iterative testing  Excellent threshold capture  No events recorded corresponding to this admission  No reprogramming  Device inplanted on 6/30/2014 at [facility] [address] [facility], Battery remaining longevity at [employer] , pt. is aware and he has outpatient device to follow  Tele reveals SR with ventricular bigeminy, continue BB and consider uptitrate for Ventricular ectopy if BP tolerates Dural chamber ICD in AAI <=> DDD mode   Normal sensing and pacing via iterative testing  Excellent threshold capture  No events recorded corresponding to this admission  No reprogramming  Device implanted on 6/30/2014 at [facility] [address] Nina, Battery remaining longevity at [employer] , pt. is aware and he has outpatient device to follow  Tele reveals SR with ventricular bigeminy, continue BB and consider uptitrate for Ventricular ectopy if BP tolerates

## 2023-10-18 NOTE — PROGRESS NOTE ADULT - SUBJECTIVE AND OBJECTIVE BOX
INCOMPLETE    Overnight Events:   Yesterday pt had an episode of vasovagal syncope while using the toilet. Per team, he syncopized for 30s. At that time he was hypotensive with SBP 50s and HR in the 40s. He then became alert and was given 1L bolus with improvement in his BPs. Transferred to SICU for further management.     KARLA     Doing well this AM. Restarted on all home medications apart from AC.     REVIEW OF SYSTEMS:  as above.     Current Meds:  acetaminophen     Tablet .. 650 milliGRAM(s) Oral every 6 hours PRN  aspirin enteric coated 81 milliGRAM(s) Oral daily  atorvastatin 40 milliGRAM(s) Oral at bedtime  carvedilol 25 milliGRAM(s) Oral every 12 hours  enoxaparin Injectable 40 milliGRAM(s) SubCutaneous every 24 hours  influenza  Vaccine (HIGH DOSE) 0.7 milliLiter(s) IntraMuscular once  lisinopril 5 milliGRAM(s) Oral daily  melatonin 3 milliGRAM(s) Oral at bedtime PRN  naloxone Injectable 0.1 milliGRAM(s) IV Push every 3 minutes PRN  potassium chloride    Tablet ER 20 milliEquivalent(s) Oral every 2 hours  sodium chloride 0.9% lock flush 10 milliLiter(s) IV Push every 1 hour PRN  tamsulosin 0.4 milliGRAM(s) Oral at bedtime      Vitals:  T(F): 97.9 (10-18), Max: 98.9 (10-17)  HR: 86 (10-18) (84 - 98)  BP: 111/57 (10-18) (94/61 - 122/67)  RR: 20 (10-18)  SpO2: 98% (10-18)  I&O's Summary    17 Oct 2023 07:01  -  18 Oct 2023 07:00  --------------------------------------------------------  IN: 1500 mL / OUT: 1380 mL / NET: 120 mL        Physical Exam:  Appearance: No acute distress; well appearing  Cardiovascular: RRR with extra beats occasionally heard   Respiratory: Clear to auscultation bilaterally  Musculoskeletal: No clubbing; no joint deformity   Neurologic: Non-focal  Lymphatic: No lymphadenopathy  Psychiatry: AAOx3, mood & affect appropriate  Skin: No rashes, ecchymoses, or cyanosis                          9.2    8.90  )-----------( 453      ( 18 Oct 2023 02:49 )             28.4     10-18    134<L>  |  102  |  11  ----------------------------<  108<H>  3.7   |  21<L>  |  0.91    Ca    7.7<L>      18 Oct 2023 02:49  Phos  3.0     10-18  Mg     2.00     10-18      PT/INR - ( 18 Oct 2023 02:49 )   PT: 14.2 sec;   INR: 1.28 ratio         PTT - ( 18 Oct 2023 02:49 )  PTT:29.5 sec        TTE 10/8  CONCLUSIONS:   1. Technically difficult image quality.   2. Left ventricular cavity is normally sized. Left ventricular wall thickness is normal. Left ventricular systolic function is mildly decreased with an ejection fraction visually estimated at 45 to 50 %. Global left ventricular hypokinesis.   3. The right ventricle is not well visualized.   4. Device lead is visualized in the right heart.   5. Trace mitral regurgitation.   INCOMPLETE    Overnight Events:   Yesterday pt had an episode of vasovagal syncope while using the toilet. Per team, he syncopized for 30s. At that time he was hypotensive with SBP 50s and HR in the 40s. He then became alert and was given 1L bolus with improvement in his BPs. Transferred to SICU for further management.     KARLA     Doing well this AM. Restarted on all home medications apart from AC.   Nothing on telemetry.   Having BM this AM without symptoms.     REVIEW OF SYSTEMS:  as above.     Current Meds:  acetaminophen     Tablet .. 650 milliGRAM(s) Oral every 6 hours PRN  aspirin enteric coated 81 milliGRAM(s) Oral daily  atorvastatin 40 milliGRAM(s) Oral at bedtime  carvedilol 25 milliGRAM(s) Oral every 12 hours  enoxaparin Injectable 40 milliGRAM(s) SubCutaneous every 24 hours  influenza  Vaccine (HIGH DOSE) 0.7 milliLiter(s) IntraMuscular once  lisinopril 5 milliGRAM(s) Oral daily  melatonin 3 milliGRAM(s) Oral at bedtime PRN  naloxone Injectable 0.1 milliGRAM(s) IV Push every 3 minutes PRN  potassium chloride    Tablet ER 20 milliEquivalent(s) Oral every 2 hours  sodium chloride 0.9% lock flush 10 milliLiter(s) IV Push every 1 hour PRN  tamsulosin 0.4 milliGRAM(s) Oral at bedtime      Vitals:  T(F): 97.9 (10-18), Max: 98.9 (10-17)  HR: 86 (10-18) (84 - 98)  BP: 111/57 (10-18) (94/61 - 122/67)  RR: 20 (10-18)  SpO2: 98% (10-18)  I&O's Summary    17 Oct 2023 07:01  -  18 Oct 2023 07:00  --------------------------------------------------------  IN: 1500 mL / OUT: 1380 mL / NET: 120 mL        Physical Exam:  Appearance: No acute distress; well appearing  Cardiovascular: RRR with extra beats occasionally heard   Respiratory: Clear to auscultation bilaterally  Musculoskeletal: No clubbing; no joint deformity   Neurologic: Non-focal  Lymphatic: No lymphadenopathy  Psychiatry: AAOx3, mood & affect appropriate  Skin: No rashes, ecchymoses, or cyanosis                          9.2    8.90  )-----------( 453      ( 18 Oct 2023 02:49 )             28.4     10-18    134<L>  |  102  |  11  ----------------------------<  108<H>  3.7   |  21<L>  |  0.91    Ca    7.7<L>      18 Oct 2023 02:49  Phos  3.0     10-18  Mg     2.00     10-18      PT/INR - ( 18 Oct 2023 02:49 )   PT: 14.2 sec;   INR: 1.28 ratio         PTT - ( 18 Oct 2023 02:49 )  PTT:29.5 sec        TTE 10/8  CONCLUSIONS:   1. Technically difficult image quality.   2. Left ventricular cavity is normally sized. Left ventricular wall thickness is normal. Left ventricular systolic function is mildly decreased with an ejection fraction visually estimated at 45 to 50 %. Global left ventricular hypokinesis.   3. The right ventricle is not well visualized.   4. Device lead is visualized in the right heart.   5. Trace mitral regurgitation.   Overnight Events:   Yesterday pt had an episode of vasovagal syncope while using the toilet. Per team, he syncopized for 30s. At that time he was hypotensive with SBP 50s and HR in the 40s. He then became alert and was given 1L bolus with improvement in his BPs. Transferred to SICU for further management.     KARLA     Doing well this AM. Restarted on all home medications apart from AC.   Nothing on telemetry.   Having BM this AM without symptoms.     REVIEW OF SYSTEMS:  as above.     Current Meds:  acetaminophen     Tablet .. 650 milliGRAM(s) Oral every 6 hours PRN  aspirin enteric coated 81 milliGRAM(s) Oral daily  atorvastatin 40 milliGRAM(s) Oral at bedtime  carvedilol 25 milliGRAM(s) Oral every 12 hours  enoxaparin Injectable 40 milliGRAM(s) SubCutaneous every 24 hours  influenza  Vaccine (HIGH DOSE) 0.7 milliLiter(s) IntraMuscular once  lisinopril 5 milliGRAM(s) Oral daily  melatonin 3 milliGRAM(s) Oral at bedtime PRN  naloxone Injectable 0.1 milliGRAM(s) IV Push every 3 minutes PRN  potassium chloride    Tablet ER 20 milliEquivalent(s) Oral every 2 hours  sodium chloride 0.9% lock flush 10 milliLiter(s) IV Push every 1 hour PRN  tamsulosin 0.4 milliGRAM(s) Oral at bedtime      Vitals:  T(F): 97.9 (10-18), Max: 98.9 (10-17)  HR: 86 (10-18) (84 - 98)  BP: 111/57 (10-18) (94/61 - 122/67)  RR: 20 (10-18)  SpO2: 98% (10-18)  I&O's Summary    17 Oct 2023 07:01  -  18 Oct 2023 07:00  --------------------------------------------------------  IN: 1500 mL / OUT: 1380 mL / NET: 120 mL        Physical Exam:  Appearance: No acute distress; well appearing  Cardiovascular: RRR with extra beats occasionally heard   Respiratory: Clear to auscultation bilaterally  Musculoskeletal: No clubbing; no joint deformity   Neurologic: Non-focal  Lymphatic: No lymphadenopathy  Psychiatry: AAOx3, mood & affect appropriate  Skin: No rashes, ecchymoses, or cyanosis                          9.2    8.90  )-----------( 453      ( 18 Oct 2023 02:49 )             28.4     10-18    134<L>  |  102  |  11  ----------------------------<  108<H>  3.7   |  21<L>  |  0.91    Ca    7.7<L>      18 Oct 2023 02:49  Phos  3.0     10-18  Mg     2.00     10-18      PT/INR - ( 18 Oct 2023 02:49 )   PT: 14.2 sec;   INR: 1.28 ratio         PTT - ( 18 Oct 2023 02:49 )  PTT:29.5 sec        TTE 10/8  CONCLUSIONS:   1. Technically difficult image quality.   2. Left ventricular cavity is normally sized. Left ventricular wall thickness is normal. Left ventricular systolic function is mildly decreased with an ejection fraction visually estimated at 45 to 50 %. Global left ventricular hypokinesis.   3. The right ventricle is not well visualized.   4. Device lead is visualized in the right heart.   5. Trace mitral regurgitation.

## 2023-10-18 NOTE — PROCEDURE NOTE - INTERROGATION NOTE: COMMENTS
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
Normal sensing pacing via iterative testing, excellent threshold capture,  no reprogramming

## 2023-10-18 NOTE — PROGRESS NOTE ADULT - SUBJECTIVE AND OBJECTIVE BOX
TEAM [ D ] Surgery Daily Progress Note  =====================================================    SUBJECTIVE: Patient seen and examined at bedside on AM rounds. Patient reports that they're feeling well. Patient is passing gas with bowel movements yesterday. Patient without syncopal episodes overnight. Denies fever, chills, N/V, chest pain, SOB    ALLERGIES:  No Known Allergies      --------------------------------------------------------------------------------------    MEDICATIONS:    Neurologic Medications  acetaminophen     Tablet .. 650 milliGRAM(s) Oral every 6 hours PRN Mild Pain (1 - 3)  melatonin 3 milliGRAM(s) Oral at bedtime PRN Insomnia    Respiratory Medications    Cardiovascular Medications  carvedilol 25 milliGRAM(s) Oral every 12 hours  lisinopril 5 milliGRAM(s) Oral daily    Gastrointestinal Medications  potassium chloride    Tablet ER 20 milliEquivalent(s) Oral every 2 hours  sodium chloride 0.9% lock flush 10 milliLiter(s) IV Push every 1 hour PRN Pre/post blood products, medications, blood draw, and to maintain line patency    Genitourinary Medications  tamsulosin 0.4 milliGRAM(s) Oral at bedtime    Hematologic/Oncologic Medications  aspirin enteric coated 81 milliGRAM(s) Oral daily  enoxaparin Injectable 40 milliGRAM(s) SubCutaneous every 24 hours  influenza  Vaccine (HIGH DOSE) 0.7 milliLiter(s) IntraMuscular once    Antimicrobial/Immunologic Medications    Endocrine/Metabolic Medications  atorvastatin 40 milliGRAM(s) Oral at bedtime    Topical/Other Medications  naloxone Injectable 0.1 milliGRAM(s) IV Push every 3 minutes PRN For ANY of the following changes in patient status:  A. RR LESS THAN 10 breaths per minute, B. Oxygen saturation LESS THAN 90%, C. Sedation score of 6    --------------------------------------------------------------------------------------    VITAL SIGNS:  T(C): 36.6 (10-18-23 @ 04:00), Max: 37.2 (10-17-23 @ 17:00)  HR: 86 (10-18-23 @ 06:00) (84 - 98)  BP: 111/57 (10-18-23 @ 06:00) (94/61 - 122/67)  RR: 20 (10-18-23 @ 06:00) (14 - 21)  SpO2: 98% (10-18-23 @ 06:00) (94% - 98%)  --------------------------------------------------------------------------------------    INS AND OUTS:    10-17-23 @ 07:01  -  10-18-23 @ 07:00  --------------------------------------------------------  IN: 1500 mL / OUT: 1380 mL / NET: 120 mL      --------------------------------------------------------------------------------------      EXAM    General: NAD, resting in bed comfortably.  Cardiac: regular rate, warm and well perfused  Respiratory: Nonlabored respirations, normal cw expansion.  Abdomen: soft, nontender, nondistended, surgical incision c/d/i  Extremities: normal strength, FROM, no deformities    --------------------------------------------------------------------------------------    LABS                        9.2    8.90  )-----------( 453      ( 18 Oct 2023 02:49 )             28.4   10-18    134<L>  |  102  |  11  ----------------------------<  108<H>  3.7   |  21<L>  |  0.91    Ca    7.7<L>      18 Oct 2023 02:49  Phos  3.0     10-18  Mg     2.00     10-18

## 2023-10-18 NOTE — DISCHARGE NOTE NURSING/CASE MANAGEMENT/SOCIAL WORK - NSDCFUADDAPPT_GEN_ALL_CORE_FT
Please follow up with your primary care provider 1-2 weeks after discharge regarding recent hospitalization.     Please resume home coumadin 5 mg daily and follow up with cardiologist, Dr Steffen Gaitan for INR check. Please call [phone] to schedule an appointment within 1 week of discharge

## 2023-10-18 NOTE — PROGRESS NOTE ADULT - PROVIDER SPECIALTY LIST ADULT
Cardiology
Cardiology
Pain Medicine
SICU
Surgery
Cardiology
Cardiology
Pain Medicine
Pain Medicine
SICU
SICU
Surgery
Cardiology

## 2023-10-18 NOTE — PROGRESS NOTE ADULT - ASSESSMENT
74 yo man with LV systolic dysfunction s/p ICD (EF 45-50%), frequent PVCs, CAD s/p DESx3, and atrial fibrillation on AC admitted for ex lap and omenectomy for peritoneal neoplasm. Cardiology consulted for vasovagal syncope.     TTE from 9/22 abd 10/8 without any significant change.   10/17 vasovagal syncope while using the toilet. SBP 50s. Improved with IVF.     Syncopal episode likely precipitated by hypovolemia. Please ensure he is adequately taking po or is euvolemic. Would also check orthostatic vitals.   At this time, he is asymptomatic and hemodynamically stable. Would continue his current home medications.   If this continues in the future, would consider medication induced syncope.     Please see attending attestation for final recommendations.     Thomas Patel MD  Cardiology Fellow  74 yo man with LV systolic dysfunction s/p ICD (EF 45-50%), frequent PVCs, CAD s/p DESx3, and atrial fibrillation on AC admitted for ex lap and omenectomy for peritoneal neoplasm. Cardiology consulted for vasovagal syncope.     TTE from 9/22 abd 10/8 without any significant change.   10/17 vasovagal syncope while using the toilet. SBP 50s. Improved with IVF.   EP interrogation without arrhythmia noted.     Syncopal episode likely precipitated by hypovolemia. Please ensure he is adequately taking po or is euvolemic. Would also check orthostatic vitals.   At this time, he is asymptomatic and hemodynamically stable. Would continue his current home medications.   If this continues in the future, would consider medication induced syncope.     Please see attending attestation for final recommendations.     Thomas Patel MD  Cardiology Fellow

## 2023-10-18 NOTE — PROGRESS NOTE ADULT - SUBJECTIVE AND OBJECTIVE BOX
24 HOUR EVENTS:  - NAEO  - Cardiology consulted     SUBJECTIVE: Patient seen and examined at bedside on AM rounds. Patient reports that they're feeling well. He is tolerating a clear liquid diet. He is passing gas but awaiting bowel movements. Denies fever, chills, N/V, chest pain, SOB    ALLERGIES:  No Known Allergies      --------------------------------------------------------------------------------------    MEDICATIONS:    Neurologic Medications  acetaminophen     Tablet .. 650 milliGRAM(s) Oral every 6 hours PRN Mild Pain (1 - 3)  melatonin 3 milliGRAM(s) Oral at bedtime PRN Insomnia    Respiratory Medications    Cardiovascular Medications  carvedilol 25 milliGRAM(s) Oral every 12 hours  lisinopril 5 milliGRAM(s) Oral daily    Gastrointestinal Medications  bisacodyl Suppository 10 milliGRAM(s) Rectal once  sodium chloride 0.9% lock flush 10 milliLiter(s) IV Push every 1 hour PRN Pre/post blood products, medications, blood draw, and to maintain line patency    Genitourinary Medications  tamsulosin 0.4 milliGRAM(s) Oral at bedtime    Hematologic/Oncologic Medications  aspirin enteric coated 81 milliGRAM(s) Oral daily  enoxaparin Injectable 40 milliGRAM(s) SubCutaneous every 24 hours  influenza  Vaccine (HIGH DOSE) 0.7 milliLiter(s) IntraMuscular once    Antimicrobial/Immunologic Medications    Endocrine/Metabolic Medications  atorvastatin 40 milliGRAM(s) Oral at bedtime    Topical/Other Medications  naloxone Injectable 0.1 milliGRAM(s) IV Push every 3 minutes PRN For ANY of the following changes in patient status:  A. RR LESS THAN 10 breaths per minute, B. Oxygen saturation LESS THAN 90%, C. Sedation score of 6    --------------------------------------------------------------------------------------    ICU Vital Signs Last 24 Hrs  T(C): 36 (17 Oct 2023 20:00), Max: 37.2 (17 Oct 2023 17:00)  T(F): 96.8 (17 Oct 2023 20:00), Max: 98.9 (17 Oct 2023 17:00)  HR: 90 (17 Oct 2023 23:00) (84 - 98)  BP: 96/73 (17 Oct 2023 23:00) (95/58 - 131/55)  BP(mean): 80 (17 Oct 2023 23:00) (65 - 91)  ABP: --  ABP(mean): --  RR: 18 (17 Oct 2023 23:00) (14 - 21)  SpO2: 96% (17 Oct 2023 23:00) (96% - 98%)    O2 Parameters below as of 17 Oct 2023 20:00  Patient On (Oxygen Delivery Method): room air        --------------------------------------------------------------------------------------    I&O's Summary    16 Oct 2023 07:01  -  17 Oct 2023 07:00  --------------------------------------------------------  IN: 1200 mL / OUT: 1550 mL / NET: -350 mL    17 Oct 2023 07:01  -  18 Oct 2023 00:06  --------------------------------------------------------  IN: 1300 mL / OUT: 630 mL / NET: 670 mL          --------------------------------------------------------------------------------------      EXAM    General: NAD, resting in bed comfortably.  Cardiac: regular rate, warm and well perfused  Respiratory: Nonlabored respirations, normal cw expansion.  Abdomen: soft, nontender, nondistended, midline incision c/d/i  Extremities: normal strength, FROM, no deformities    --------------------------------------------------------------------------------------      10-17    135  |  102  |  9   ----------------------------<  140<H>  3.9   |  22  |  0.98    Ca    8.9      17 Oct 2023 09:35  Phos  3.4     10-17  Mg     2.10     10-17

## 2023-10-18 NOTE — PROGRESS NOTE ADULT - ASSESSMENT
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 Patient is s/p ex lap with omentectomy on 10/6, case aborted intraop 2/2 bigeminy. Surgical rapid called for syncopal episode while patient was using the bathroom and hypotensive w/ SBP to the 80s.       PLAN  NEUROLOGIC   - AAOx 3  - Pain control with tylenol    RESPIRATORY   - Currently on RA  - Monitor SpO2 goal >92%    CARDIOVASCULAR   - Monitor hemodynamics   - MAP goal > 65  - Home meds- statin, coreg, lisinopril, ASA    GASTROINTESTINAL   - Diet: Regular    /RENAL   - Flomax  - Monitor BMP  - Strict I/Os  - Monitor electrolytes, replete PRN    HEMATOLOGIC  - Monitor H/H   - DVT prophylaxis: SCDs vs lovenox    INFECTIOUS DISEASE  - Monitor fever/WC    ENDOCRINE  - Monitor gluc    LINES  - PIV    Disposition:   SICU   HPI: 73 male with hx of HLD, BPH, CAD, afib, CHF, HTN, AICD, and Arteriosclerotic heart disease (ASHD) 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 Patient is s/p ex lap with omentectomy on 10/6, case aborted intraop 2/2 bigeminy. Surgical rapid called for syncopal episode while patient was using the bathroom and hypotensive w/ SBP to the 80s.       PLAN  NEUROLOGIC  - AAOx 3  - Pain control with tylenol    RESPIRATORY   - Currently on RA  - Monitor SpO2 goal >92%    CARDIOVASCULAR   - Monitor hemodynamics   - MAP goal > 65  - Home meds- statin, coreg, lisinopril, ASA    GASTROINTESTINAL   - Diet: Regular    /RENAL   - Flomax  - Monitor BMP  - Strict I/Os  - Monitor electrolytes, replete PRN    HEMATOLOGIC  - Monitor H/H   - DVT prophylaxis: SCDs vs lovenox    INFECTIOUS DISEASE  - Monitor fever/WC    ENDOCRINE  - Monitor gluc    LINES  - PIV    Disposition:   SICU

## 2023-10-19 LAB
SURGICAL PATHOLOGY STUDY: SIGNIFICANT CHANGE UP
SURGICAL PATHOLOGY STUDY: SIGNIFICANT CHANGE UP

## 2023-10-26 ENCOUNTER — APPOINTMENT (OUTPATIENT)
Dept: SURGICAL ONCOLOGY | Facility: CLINIC | Age: 73
End: 2023-10-26
Payer: MEDICARE

## 2023-10-26 VITALS
HEART RATE: 88 BPM | HEIGHT: 63 IN | DIASTOLIC BLOOD PRESSURE: 62 MMHG | OXYGEN SATURATION: 97 % | RESPIRATION RATE: 18 BRPM | SYSTOLIC BLOOD PRESSURE: 102 MMHG | WEIGHT: 162 LBS | BODY MASS INDEX: 28.7 KG/M2

## 2023-10-26 PROCEDURE — 99024 POSTOP FOLLOW-UP VISIT: CPT

## 2023-10-30 ENCOUNTER — OUTPATIENT (OUTPATIENT)
Dept: OUTPATIENT SERVICES | Facility: HOSPITAL | Age: 73
LOS: 1 days | Discharge: HOME | End: 2023-10-30

## 2023-10-30 DIAGNOSIS — Z90.49 ACQUIRED ABSENCE OF OTHER SPECIFIED PARTS OF DIGESTIVE TRACT: Chronic | ICD-10-CM

## 2023-10-30 DIAGNOSIS — Z98.890 OTHER SPECIFIED POSTPROCEDURAL STATES: Chronic | ICD-10-CM

## 2023-10-30 DIAGNOSIS — Z95.810 PRESENCE OF AUTOMATIC (IMPLANTABLE) CARDIAC DEFIBRILLATOR: Chronic | ICD-10-CM

## 2023-10-30 DIAGNOSIS — K92.2 GASTROINTESTINAL HEMORRHAGE, UNSPECIFIED: ICD-10-CM

## 2023-10-30 DIAGNOSIS — S82.90XA UNSPECIFIED FRACTURE OF UNSPECIFIED LOWER LEG, INITIAL ENCOUNTER FOR CLOSED FRACTURE: Chronic | ICD-10-CM

## 2023-10-30 DIAGNOSIS — Z95.5 PRESENCE OF CORONARY ANGIOPLASTY IMPLANT AND GRAFT: Chronic | ICD-10-CM

## 2023-11-02 ENCOUNTER — APPOINTMENT (OUTPATIENT)
Dept: CARDIOLOGY | Facility: CLINIC | Age: 73
End: 2023-11-02
Payer: MEDICARE

## 2023-11-02 ENCOUNTER — NON-APPOINTMENT (OUTPATIENT)
Age: 73
End: 2023-11-02

## 2023-11-02 VITALS
HEART RATE: 83 BPM | OXYGEN SATURATION: 98 % | DIASTOLIC BLOOD PRESSURE: 63 MMHG | SYSTOLIC BLOOD PRESSURE: 91 MMHG | BODY MASS INDEX: 26.93 KG/M2 | RESPIRATION RATE: 14 BRPM | HEIGHT: 63 IN | WEIGHT: 152 LBS

## 2023-11-02 DIAGNOSIS — I50.9 HEART FAILURE, UNSPECIFIED: ICD-10-CM

## 2023-11-02 LAB
INR PPP: 3.5 RATIO
POCT-PROTHROMBIN TIME: 41.8 SECS
QUALITY CONTROL: YES

## 2023-11-02 PROCEDURE — XXXXX: CPT | Mod: 1L

## 2023-11-02 RX ORDER — POTASSIUM CHLORIDE 1500 MG/1
20 TABLET, FILM COATED, EXTENDED RELEASE ORAL
Qty: 4 | Refills: 0 | Status: DISCONTINUED | COMMUNITY
Start: 2023-09-08 | End: 2023-11-02

## 2023-11-03 ENCOUNTER — APPOINTMENT (OUTPATIENT)
Dept: HEMATOLOGY ONCOLOGY | Facility: CLINIC | Age: 73
End: 2023-11-03
Payer: MEDICARE

## 2023-11-03 ENCOUNTER — APPOINTMENT (OUTPATIENT)
Dept: FAMILY MEDICINE | Facility: CLINIC | Age: 73
End: 2023-11-03
Payer: MEDICARE

## 2023-11-03 VITALS
HEART RATE: 83 BPM | DIASTOLIC BLOOD PRESSURE: 65 MMHG | HEIGHT: 63 IN | SYSTOLIC BLOOD PRESSURE: 98 MMHG | RESPIRATION RATE: 16 BRPM | WEIGHT: 150 LBS | TEMPERATURE: 97.6 F | BODY MASS INDEX: 26.58 KG/M2 | OXYGEN SATURATION: 98 %

## 2023-11-03 VITALS
WEIGHT: 151.99 LBS | BODY MASS INDEX: 26.93 KG/M2 | HEART RATE: 81 BPM | RESPIRATION RATE: 16 BRPM | SYSTOLIC BLOOD PRESSURE: 106 MMHG | OXYGEN SATURATION: 100 % | TEMPERATURE: 96.2 F | DIASTOLIC BLOOD PRESSURE: 71 MMHG

## 2023-11-03 DIAGNOSIS — Z09 ENCOUNTER FOR FOLLOW-UP EXAMINATION AFTER COMPLETED TREATMENT FOR CONDITIONS OTHER THAN MALIGNANT NEOPLASM: ICD-10-CM

## 2023-11-03 DIAGNOSIS — E78.5 HYPERLIPIDEMIA, UNSPECIFIED: ICD-10-CM

## 2023-11-03 DIAGNOSIS — R79.89 OTHER SPECIFIED ABNORMAL FINDINGS OF BLOOD CHEMISTRY: ICD-10-CM

## 2023-11-03 PROCEDURE — 99214 OFFICE O/P EST MOD 30 MIN: CPT | Mod: 25

## 2023-11-03 PROCEDURE — 36415 COLL VENOUS BLD VENIPUNCTURE: CPT

## 2023-11-03 PROCEDURE — 99214 OFFICE O/P EST MOD 30 MIN: CPT

## 2023-11-06 LAB
25(OH)D3 SERPL-MCNC: 43.3 NG/ML
ALBUMIN SERPL ELPH-MCNC: 3.8 G/DL
ALP BLD-CCNC: 85 U/L
ALT SERPL-CCNC: 20 U/L
ANION GAP SERPL CALC-SCNC: 10 MMOL/L
AST SERPL-CCNC: 17 U/L
BASOPHILS # BLD AUTO: 0.04 K/UL
BASOPHILS NFR BLD AUTO: 0.8 %
BILIRUB SERPL-MCNC: 0.7 MG/DL
BUN SERPL-MCNC: 17 MG/DL
CALCIUM SERPL-MCNC: 9 MG/DL
CHLORIDE SERPL-SCNC: 101 MMOL/L
CHOLEST SERPL-MCNC: 149 MG/DL
CO2 SERPL-SCNC: 26 MMOL/L
CREAT SERPL-MCNC: 1.05 MG/DL
EGFR: 74 ML/MIN/1.73M2
EOSINOPHIL # BLD AUTO: 0.27 K/UL
EOSINOPHIL NFR BLD AUTO: 5.5 %
ESTIMATED AVERAGE GLUCOSE: 111 MG/DL
FERRITIN SERPL-MCNC: 246 NG/ML
FOLATE SERPL-MCNC: >20 NG/ML
GLUCOSE SERPL-MCNC: 110 MG/DL
HBA1C MFR BLD HPLC: 5.5 %
HCT VFR BLD CALC: 35.9 %
HDLC SERPL-MCNC: 46 MG/DL
HGB BLD-MCNC: 11.2 G/DL
IMM GRANULOCYTES NFR BLD AUTO: 0.4 %
IRON SATN MFR SERPL: 14 %
IRON SERPL-MCNC: 36 UG/DL
LDLC SERPL CALC-MCNC: 83 MG/DL
LYMPHOCYTES # BLD AUTO: 0.89 K/UL
LYMPHOCYTES NFR BLD AUTO: 18.1 %
MAN DIFF?: NORMAL
MCHC RBC-ENTMCNC: 28.4 PG
MCHC RBC-ENTMCNC: 31.2 GM/DL
MCV RBC AUTO: 90.9 FL
MONOCYTES # BLD AUTO: 0.49 K/UL
MONOCYTES NFR BLD AUTO: 10 %
NEUTROPHILS # BLD AUTO: 3.2 K/UL
NEUTROPHILS NFR BLD AUTO: 65.2 %
NONHDLC SERPL-MCNC: 102 MG/DL
PLATELET # BLD AUTO: 257 K/UL
POTASSIUM SERPL-SCNC: 4.2 MMOL/L
PROT SERPL-MCNC: 7 G/DL
RBC # BLD: 3.95 M/UL
RBC # FLD: 14.3 %
SODIUM SERPL-SCNC: 137 MMOL/L
TIBC SERPL-MCNC: 250 UG/DL
TRANSFERRIN SERPL-MCNC: 196 MG/DL
TRIGL SERPL-MCNC: 105 MG/DL
TSH SERPL-ACNC: 1.48 UIU/ML
UIBC SERPL-MCNC: 214 UG/DL
VIT B12 SERPL-MCNC: 622 PG/ML
WBC # FLD AUTO: 4.91 K/UL

## 2023-11-10 ENCOUNTER — APPOINTMENT (OUTPATIENT)
Dept: RADIATION ONCOLOGY | Facility: CLINIC | Age: 73
End: 2023-11-10
Payer: MEDICARE

## 2023-11-10 VITALS
HEART RATE: 80 BPM | BODY MASS INDEX: 27.54 KG/M2 | SYSTOLIC BLOOD PRESSURE: 111 MMHG | WEIGHT: 155.42 LBS | TEMPERATURE: 96.1 F | OXYGEN SATURATION: 100 % | RESPIRATION RATE: 17 BRPM | DIASTOLIC BLOOD PRESSURE: 82 MMHG | HEIGHT: 63 IN

## 2023-11-10 PROCEDURE — 99205 OFFICE O/P NEW HI 60 MIN: CPT | Mod: GC,25

## 2023-11-13 ENCOUNTER — APPOINTMENT (OUTPATIENT)
Dept: FAMILY MEDICINE | Facility: CLINIC | Age: 73
End: 2023-11-13
Payer: MEDICARE

## 2023-11-13 ENCOUNTER — TRANSCRIPTION ENCOUNTER (OUTPATIENT)
Age: 73
End: 2023-11-13

## 2023-11-13 VITALS
OXYGEN SATURATION: 98 % | RESPIRATION RATE: 16 BRPM | BODY MASS INDEX: 27.11 KG/M2 | WEIGHT: 153 LBS | DIASTOLIC BLOOD PRESSURE: 73 MMHG | TEMPERATURE: 97.6 F | HEIGHT: 63 IN | SYSTOLIC BLOOD PRESSURE: 109 MMHG | HEART RATE: 97 BPM

## 2023-11-13 LAB
INR PPP: 2.4 RATIO
POCT-PROTHROMBIN TIME: 28.7 SECS
QUALITY CONTROL: YES

## 2023-11-13 PROCEDURE — 85610 PROTHROMBIN TIME: CPT | Mod: QW

## 2023-11-13 PROCEDURE — 99213 OFFICE O/P EST LOW 20 MIN: CPT | Mod: 25

## 2023-12-04 NOTE — ASU PREOP CHECKLIST - WARM FLUIDS/WARM BLANKETS
Refill Request - Controlled Substance    CONFIRM preferred pharmacy with the patient. If Mail Order Rx - Pend for 90 day refill. Last Seen Department: 10/24/2023  Last Seen by PCP: 10/24/2023    Last Written: 10/30/2023    Last UDS: 7/24/2023    Med Agreement Signed On: 7/25/2023    If no future appointment scheduled:  Review the last OV with PCP and review information for follow-up visit,  Route STAFF MESSAGE with patient name to the [facility] for scheduling with the following information:            -  Timing of next visit           -  Visit type ie Physical, OV, etc           -  Diagnoses/Reason ie. COPD, HTN - Do not use MEDICATION, Follow-up or CHECK UP - Give reason for visit        Next Appointment:   Future Appointments   Date Time Provider [address]   2/26/2024 10:30 AM Ronal Pineda, DO [facility]  [place] - [facility]       Message sent to [facility] to schedule appt with patient? NO      Requested Prescriptions     Pending Prescriptions Disp Refills    amphetamine-dextroamphetamine (ADDERALL) 15 MG tablet 60 tablet 0     Sig: Take 1 tablet by mouth 2 times daily for 30 days.  Max Daily Amount: 30 mg no

## 2023-12-08 ENCOUNTER — RX RENEWAL (OUTPATIENT)
Age: 73
End: 2023-12-08

## 2023-12-08 ENCOUNTER — TRANSCRIPTION ENCOUNTER (OUTPATIENT)
Age: 73
End: 2023-12-08

## 2023-12-13 ENCOUNTER — RX RENEWAL (OUTPATIENT)
Age: 73
End: 2023-12-13

## 2023-12-14 ENCOUNTER — RX RENEWAL (OUTPATIENT)
Age: 73
End: 2023-12-14

## 2023-12-22 ENCOUNTER — APPOINTMENT (OUTPATIENT)
Dept: HEMATOLOGY ONCOLOGY | Facility: CLINIC | Age: 73
End: 2023-12-22
Payer: MEDICARE

## 2023-12-22 VITALS
OXYGEN SATURATION: 98 % | RESPIRATION RATE: 16 BRPM | DIASTOLIC BLOOD PRESSURE: 74 MMHG | HEART RATE: 81 BPM | BODY MASS INDEX: 27.73 KG/M2 | TEMPERATURE: 97.8 F | SYSTOLIC BLOOD PRESSURE: 111 MMHG | WEIGHT: 156.53 LBS

## 2023-12-22 PROCEDURE — 99214 OFFICE O/P EST MOD 30 MIN: CPT

## 2023-12-24 NOTE — REVIEW OF SYSTEMS
[Fatigue] : fatigue [Diarrhea: Grade 0] : Diarrhea: Grade 0 [Negative] : Allergic/Immunologic [Fever] : no fever [Chills] : no chills [Night Sweats] : no night sweats

## 2023-12-24 NOTE — HISTORY OF PRESENT ILLNESS
[de-identified] : Shiva Arroyo is a 73 years old male with past medical history of HFrEF with AICD (LVEF 45-50% on 10/8/23 TTE), CAD s/p stent 2008 and 2018 on aspirin, Afib on coumadin, dyslipidemia and B12 deficiency. 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  The initial medical oncology consultation on 7/10/23: He presented with gross hematuria for two days in the end of June 2023. It was resolved after stopping coumadin. He restarted coumadin 3 days after its discontinuation.  6/24/23 CT renal stone hunt showed left upper quadrant mesenteric and omental nodules suspicious of carcinomatosis, a few nodules in the deep pelvis. of note, In September 2018 CT abdomen and pelvis showed a left mesenteric node 1.4 x1.5cm.  Today 7/10/23 he reports feeling overall fine, still works as Dentist in [place], [place]. His family lives in [place]. He comes back in the weekend. He loses 20lbs in one and a half years by intermittent fasting. Denies any gross hematuria and abdominal pain. [de-identified] : 8/11/23 - 7/10/23 CT chest with IV contrast; CT abd/p urogram with IV contrast showing Extensive carcinomatosis including a small focus of soft tissue herniating through the anterior abdominal wall likely small focal hernia. Etiology for carcinomatosis not clearly delineated. Small pulmonary nodule unclear significance. Bilateral renal cysts, including large right renal cyst measuring 8 cm; no hydronephrosis. No solid renal masses. -S/p IR ultrasound guided left upper quadrant omental mass core biopsy 7/28/23 with pathology showing positive for MALIGNANT CELLS: Mucinous carcinoma, low grade, G1.  He denies N/V/D, constipation, abd pain, melena/hematochezia, fever/chills, night sweats. Denied hematuria. Appetite is ok. He refuses vital signs during the visit.   11/3/23: -he was admitted to [facility] 10/6/23-10/18/23: scheduled surgery for malignant neoplasm of RP on 10/6/23; S/p ex lap with omentectomy and repair of serosa for secondary malignant neoplasm of the retroperitoneum. Planned HIPEC and further debulking abandoned due to patient cardiac arrhythmias (ventricular bigeminy) and need for pressors in OR.(Surg Dr. 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 Postop changes of the abdomen and pelvis. No evidence of bowel  obstruction or abscess. There is a new enhancing lesion anterior to the pancreas, suggestive of new peritoneal implant, new since prior study on 7/10/2023. Residual peritoneal implant in the left upper quadrant mesentery and omentum with interval decrease in size. lab on 10/18/23 Hb 9.2; plt 453; WBC 8.9 Ca 7.7; Cr 0.91;  He and his wife report that he feels fatigue has been improving. Walks with a cane. Denied N/V/D, admits intermittent mild abd pain.   12/22/23  Pt is accompanied with his daughter Kelley who is a pediatrician. Pt feels overall fine, denies fatigue, abd pain, N/V/Diarrhea/constipation, night sweats. Good appetite, 1-2 regular bowel movements. 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

## 2023-12-24 NOTE — REASON FOR VISIT
[Follow-Up Visit] : a follow-up [Spouse] : spouse [Family Member] : family member [FreeTextEntry2] : peritoneal carcinomatosis; mucinous adenocarcinoma low grade.

## 2023-12-24 NOTE — PHYSICAL EXAM
[Restricted in physically strenuous activity but ambulatory and able to carry out work of a light or sedentary nature] : Status 1- Restricted in physically strenuous activity but ambulatory and able to carry out work of a light or sedentary nature, e.g., light house work, office work [Thin] : thin [Normal] : affect appropriate [de-identified] : distended abd

## 2023-12-24 NOTE — ASSESSMENT
[FreeTextEntry1] : 73 years old male who had the initial medical oncology consultation on 7/10/23. He initially presented with one episode of gross hematuria, on 6/24/23 CT renal stone hunt incidentally revealed numerous soft tissue nodules on left mesenteric fat and omental nodules.  of note, In September 2018 CT abdomen and pelvis showed a left mesenteric node 1.5x1.4 cm, however, no obvious other lesions to suggest primary site.  He had history of left inguinal hernia for several years s/p 8/26/19 left inguinal hernia repair with medium oval. and he followed up with surgery Dr. Tam for that hernia repair.  To be noted, per pathology report, he had appendectomy due to perforated appendicitis on 9/23/2018 at [facility] with pathology showing: Appendix, (laparoscopic appendectomy): Low-grade appendiceal mucinous neoplasm in a background of perforation, acute and chronic appendicitis, extensive ulceration, dense appendiceal wall fibrosis and acute periappendicitis: Focal extra-appendiceal acellular mucin is identified. Primary Tumor pT4a: Acellular mucin focally involving the serosa of the appendix or mesoappendix in background of perforation (slide 1M1) (pT4a).  Resection margin negative for dysplasia and malignancy. lymph-vascular invasion and tmor deposits not identified. no regional lymph nodes submitted or found.   Given incidental findings on scans, he will need better images CT urogram and CT chest to complete staging work up given his gross hematuria history although he was on coumadin. He will need to be arranged for IR guided a biopsy for his left upper quadrant mesenteric and omental lesions.  - 7/10/23 CT chest with IV contrast; CT abd/p urogram with IV contrast showing Extensive carcinomatosis including a small focus of soft tissue herniating through the anterior abdominal wall likely small focal hernia. Etiology for carcinomatosis not clearly delineated. Small pulmonary nodule unclear significance. Bilateral renal cysts, including large right renal cyst measuring 8 cm; no hydronephrosis. No solid renal masses.  -S/p IR ultrasound guided left upper quadrant omental mass core biopsy 7/28/23 with pathology showing positive for MALIGNANT CELLS: Mucinous carcinoma, low grade, Grade 1.  8/11/23 Discussed with pt and his wife, and also his daughter Jason who is a peditrician on the phone about the pathology result from omental mass core biopsy on 7/28/23. In view of his grade 1 metastatic adenocarcinoma, We recommend local therapy including CRS (debulking) by Dr. Silver and HIPEC (Hyperthermic intraperitoneal chemotherapy) during the surgery. After the surgery, will repeat CT and if there still disease there, may consider Radiation therapy if residual diseases are found and potential chemotherapy following with surveillance scans.  he was admitted to [facility] 10/6/23-10/18/23: scheduled surgery for malignant neoplasm of RP; S/p ex lap with omentectomy on 10/6,  Planned HIPEC and further debulking abandoned due to cardiac arrhythmias (ventricular bigeminy) and need for pressors in OR(Surg Dr. Silver). 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   -10/13/23 CT abd/p with IV contrast showing Postop changes of the abdomen and pelvis. No evidence of bowel obstruction or abscess. There is a new enhancing lesion anterior to the pancreas, suggestive of new peritoneal implant, new since prior study on 7/10/2023. Residual peritoneal implant in the left upper quadrant mesentery and omentum with interval decrease in size.   Plan -11/3/23 Discussed with pt and his wife, his daughter Kelley on the phone about the pathology and image result, recommend local treatment with radiation for now since surgery was abortive due to dysrhythmia. No rush for systemic chemo in view of low grade mucinous carcinoma. However he will be referred to Dr. Davis Kerr to see if radiation is feasible. Alternatively, capecitabine will be considered if radiation is unavailable. Reviewed the most common side effects of capecitabine including but not limited to nausea, hand foot syndrome, fatigue, diarrhea, low blood counts due to marrow suppression.  -12/22/23 reviewed with pt and his daughter Kelley about the CT abd in 10/2023. Unclear if the new enhancing lesion anterior to the pancreas is related to post-op changes vs less likely malignancy. Will discuss with Dr. Silver after images in January. -Repeat CT abd and CXR in Jan 2024.  -Foundation one from surgery sample: no targetable mutations found -Reviewed lab lab on 11/3/23 Hb 11.2 improved comparing to 10/18/23 Hb 9.1;  Cr wnl;  -continue f/u with Onc surgery Dr. Cordell Silver.   RTC in 3 months

## 2024-01-04 ENCOUNTER — RESULT REVIEW (OUTPATIENT)
Age: 74
End: 2024-01-04

## 2024-01-05 ENCOUNTER — APPOINTMENT (OUTPATIENT)
Dept: CT IMAGING | Facility: IMAGING CENTER | Age: 74
End: 2024-01-05
Payer: MEDICARE

## 2024-01-05 ENCOUNTER — OUTPATIENT (OUTPATIENT)
Dept: OUTPATIENT SERVICES | Facility: HOSPITAL | Age: 74
LOS: 1 days | End: 2024-01-05
Payer: MEDICARE

## 2024-01-05 DIAGNOSIS — Z90.49 ACQUIRED ABSENCE OF OTHER SPECIFIED PARTS OF DIGESTIVE TRACT: Chronic | ICD-10-CM

## 2024-01-05 DIAGNOSIS — Z95.5 PRESENCE OF CORONARY ANGIOPLASTY IMPLANT AND GRAFT: Chronic | ICD-10-CM

## 2024-01-05 DIAGNOSIS — Z95.810 PRESENCE OF AUTOMATIC (IMPLANTABLE) CARDIAC DEFIBRILLATOR: Chronic | ICD-10-CM

## 2024-01-05 DIAGNOSIS — Z00.8 ENCOUNTER FOR OTHER GENERAL EXAMINATION: ICD-10-CM

## 2024-01-05 DIAGNOSIS — S82.90XA UNSPECIFIED FRACTURE OF UNSPECIFIED LOWER LEG, INITIAL ENCOUNTER FOR CLOSED FRACTURE: Chronic | ICD-10-CM

## 2024-01-05 DIAGNOSIS — C80.1 MALIGNANT (PRIMARY) NEOPLASM, UNSPECIFIED: ICD-10-CM

## 2024-01-05 DIAGNOSIS — Z98.890 OTHER SPECIFIED POSTPROCEDURAL STATES: Chronic | ICD-10-CM

## 2024-01-05 PROCEDURE — 74177 CT ABD & PELVIS W/CONTRAST: CPT | Mod: 26

## 2024-01-05 PROCEDURE — 74177 CT ABD & PELVIS W/CONTRAST: CPT

## 2024-01-05 PROCEDURE — 71260 CT THORAX DX C+: CPT

## 2024-01-05 PROCEDURE — 71260 CT THORAX DX C+: CPT | Mod: 26

## 2024-01-25 ENCOUNTER — TRANSCRIPTION ENCOUNTER (OUTPATIENT)
Age: 74
End: 2024-01-25

## 2024-03-05 ENCOUNTER — RX RENEWAL (OUTPATIENT)
Age: 74
End: 2024-03-05

## 2024-03-15 ENCOUNTER — RX RENEWAL (OUTPATIENT)
Age: 74
End: 2024-03-15

## 2024-04-03 ENCOUNTER — OUTPATIENT (OUTPATIENT)
Dept: OUTPATIENT SERVICES | Facility: HOSPITAL | Age: 74
LOS: 1 days | Discharge: HOME | End: 2024-04-03

## 2024-04-03 DIAGNOSIS — K92.2 GASTROINTESTINAL HEMORRHAGE, UNSPECIFIED: ICD-10-CM

## 2024-04-03 DIAGNOSIS — Z95.5 PRESENCE OF CORONARY ANGIOPLASTY IMPLANT AND GRAFT: Chronic | ICD-10-CM

## 2024-04-03 DIAGNOSIS — Z90.49 ACQUIRED ABSENCE OF OTHER SPECIFIED PARTS OF DIGESTIVE TRACT: Chronic | ICD-10-CM

## 2024-04-03 DIAGNOSIS — Z95.810 PRESENCE OF AUTOMATIC (IMPLANTABLE) CARDIAC DEFIBRILLATOR: Chronic | ICD-10-CM

## 2024-04-03 DIAGNOSIS — S82.90XA UNSPECIFIED FRACTURE OF UNSPECIFIED LOWER LEG, INITIAL ENCOUNTER FOR CLOSED FRACTURE: Chronic | ICD-10-CM

## 2024-04-03 DIAGNOSIS — Z98.890 OTHER SPECIFIED POSTPROCEDURAL STATES: Chronic | ICD-10-CM

## 2024-04-12 ENCOUNTER — APPOINTMENT (OUTPATIENT)
Dept: HEMATOLOGY ONCOLOGY | Facility: CLINIC | Age: 74
End: 2024-04-12

## 2024-04-12 ENCOUNTER — RX RENEWAL (OUTPATIENT)
Age: 74
End: 2024-04-12

## 2024-04-23 ENCOUNTER — RX RENEWAL (OUTPATIENT)
Age: 74
End: 2024-04-23

## 2024-04-29 ENCOUNTER — TRANSCRIPTION ENCOUNTER (OUTPATIENT)
Age: 74
End: 2024-04-29

## 2024-05-01 ENCOUNTER — RX RENEWAL (OUTPATIENT)
Age: 74
End: 2024-05-01

## 2024-05-01 NOTE — PACU DISCHARGE NOTE - NSCLINEINSERTRD_GEN_ALL_CORE
Cleanser Recommendations: 10 min luke warm water\\nsoapy \"dirty\" areas with gentle cleanser Moisturizer Recommendations: aggressive moisturizer which is fragrance/dye fee Detail Level: Simple Yes Detail Level: Zone

## 2024-05-10 ENCOUNTER — TRANSCRIPTION ENCOUNTER (OUTPATIENT)
Age: 74
End: 2024-05-10

## 2024-05-17 NOTE — PATIENT PROFILE ADULT. - TEACHING/LEARNING LEARNING PREFERENCES
Goal Outcome Evaluation:   Alert and oriented and pleasant with staff. X1 assist for transfers and ambulation. No c/o pain requiring PRN pain medication noted this shift. No other significant changes noted this shift. Sitting up in bed, family at bedside, call light in reach.                                              written material/individual instruction/audio/skill demonstration

## 2024-05-23 ENCOUNTER — NON-APPOINTMENT (OUTPATIENT)
Age: 74
End: 2024-05-23

## 2024-05-24 ENCOUNTER — APPOINTMENT (OUTPATIENT)
Dept: HEMATOLOGY ONCOLOGY | Facility: CLINIC | Age: 74
End: 2024-05-24
Payer: MEDICARE

## 2024-05-24 VITALS
RESPIRATION RATE: 16 BRPM | BODY MASS INDEX: 27.34 KG/M2 | DIASTOLIC BLOOD PRESSURE: 68 MMHG | OXYGEN SATURATION: 97 % | WEIGHT: 154.32 LBS | TEMPERATURE: 98.3 F | SYSTOLIC BLOOD PRESSURE: 115 MMHG | HEART RATE: 78 BPM

## 2024-05-24 PROCEDURE — G2211 COMPLEX E/M VISIT ADD ON: CPT

## 2024-05-24 PROCEDURE — 99213 OFFICE O/P EST LOW 20 MIN: CPT

## 2024-05-27 NOTE — REASON FOR VISIT
[Follow-Up Visit] : a follow-up [Family Member] : family member [FreeTextEntry2] : peritoneal carcinomatosis; mucinous adenocarcinoma low grade.

## 2024-05-27 NOTE — REVIEW OF SYSTEMS
[Diarrhea: Grade 0] : Diarrhea: Grade 0 [Negative] : Allergic/Immunologic [Vomiting] : no vomiting [FreeTextEntry7] : intermittent very mild abd pain, denies n/v/diarrhea. occasional constipation

## 2024-05-27 NOTE — ASSESSMENT
[FreeTextEntry1] : 73 years old male who had the initial medical oncology consultation on 7/10/23. He initially presented with one episode of gross hematuria, on 6/24/23 CT renal stone hunt incidentally revealed numerous soft tissue nodules on left mesenteric fat and omental nodules. of note, In September 2018 CT abdomen and pelvis showed a left mesenteric node 1.5x1.4 cm, however, no obvious other lesions to suggest primary site.  He had history of left inguinal hernia for several years s/p 8/26/19 left inguinal hernia repair with medium oval. and he followed up with surgery Dr. Tam for that hernia repair.  To be noted, per pathology report, he had appendectomy due to perforated appendicitis on 9/23/2018 at [facility] with pathology showing: Appendix, (laparoscopic appendectomy): Low-grade appendiceal mucinous neoplasm in a background of perforation, acute and chronic appendicitis, extensive ulceration, dense appendiceal wall fibrosis and acute periappendicitis: Focal extra-appendiceal acellular mucin is identified. Primary Tumor pT4a: Acellular mucin focally involving the serosa of the appendix or mesoappendix in background of perforation (slide 1M1) (pT4a). Resection margin negative for dysplasia and malignancy. lymph-vascular invasion and tmor deposits not identified. no regional lymph nodes submitted or found.   Given incidental findings on scans, he will need better images CT urogram and CT chest to complete staging work up given his gross hematuria history although he was on coumadin. He will need to be arranged for IR guided a biopsy for his left upper quadrant mesenteric and omental lesions.  - 7/10/23 CT chest with IV contrast; CT abd/p urogram with IV contrast showing Extensive carcinomatosis including a small focus of soft tissue herniating through the anterior abdominal wall likely small focal hernia. Etiology for carcinomatosis not clearly delineated. Small pulmonary nodule unclear significance. Bilateral renal cysts, including large right renal cyst measuring 8 cm; no hydronephrosis. No solid renal masses.  -S/p IR ultrasound guided left upper quadrant omental mass core biopsy 7/28/23 with pathology showing positive for MALIGNANT CELLS: Mucinous carcinoma, low grade, Grade 1.  8/11/23 Discussed with pt and his wife, and also his daughter Jason who is a peditrician on the phone about the pathology result from omental mass core biopsy on 7/28/23. In view of his grade 1 metastatic adenocarcinoma, We recommend local therapy including CRS (debulking) by Dr. Silver and HIPEC (Hyperthermic intraperitoneal chemotherapy) during the surgery. After the surgery, will repeat CT and if there still disease there, may consider Radiation therapy if residual diseases are found and potential chemotherapy following with surveillance scans.  he was admitted to [facility] 10/6/23-10/18/23: scheduled surgery for malignant neoplasm of RP; S/p ex lap with omentectomy on 10/6, Planned HIPEC and further debulking abandoned due to cardiac arrhythmias (ventricular bigeminy) and need for pressors in OR(Surg Dr. Silver). 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  -10/13/23 CT abd/p with IV contrast showing Postop changes of the abdomen and pelvis. No evidence of bowel obstruction or abscess. There is a new enhancing lesion anterior to the pancreas, suggestive of new peritoneal implant, new since prior study on 7/10/2023. Residual peritoneal implant in the left upper quadrant mesentery and omentum with interval decrease in size.   Plan -11/3/23 Discussed with pt and his wife, his daughter Kelley on the phone about the pathology and image result, recommend local treatment with radiation for now since surgery was abortive due to dysrhythmia. No rush for systemic chemo in view of low grade mucinous carcinoma. However he will be referred to Dr. Davis Kerr to see if radiation is feasible. Alternatively, capecitabine will be considered if radiation is unavailable. Reviewed the most common side effects of capecitabine including but not limited to nausea, hand foot syndrome, fatigue, diarrhea, low blood counts due to marrow suppression. -12/22/23 reviewed with pt and his daughter Kelley about the CT abd in 10/2023. Unclear if the new enhancing lesion anterior to the pancreas is related to post-op changes vs less likely malignancy. Will discuss with Dr. Silver after images in January. - -Repeated CT c/abd/p with contrast on Jan 5th 2024: Interval resolution of previously seen mass like infiltration anterior to the pancreas, which may have represented postoperative hematoma. 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 *  Stable 5 mm right middle lobe pulmonary nodule. -repeat CT scan will be done around July 2024 (ordered on 5/24/24); will call patient about the result -Foundation one from surgery sample: no targetable mutations found -lab on 11/3/23 Hb 11.2 improved comparing to 10/18/23 Hb 9.1; Cr wnl; -he will have lab from PCP soon; will f/u lab result -continue f/u with Onc surgery Dr. Cordell Silver.  RTC in 6 months.

## 2024-05-27 NOTE — HISTORY OF PRESENT ILLNESS
[de-identified] : Shiva Arroyo is a 73 years old male with past medical history of HFrEF with AICD (LVEF 45-50% on 10/8/23 TTE), CAD s/p stent 2008 and 2018 on aspirin, Afib on coumadin, dyslipidemia and B12 deficiency. 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 He had the initial medical oncology consultation on 7/10/23   9/24/2018 appendiceal perforation s/p appendectomy, incidentally found to have appendiceal mucinous neoplasm,pT4a, grade 1.  The initial medical oncology consultation on 7/10/23: He presented with gross hematuria for two days in the end of June 2023. It was resolved after stopping coumadin. He restarted coumadin 3 days after its discontinuation.  6/24/23 CT renal stone hunt showed left upper quadrant mesenteric and omental nodules suspicious of carcinomatosis, a few nodules in the deep pelvis. of note, In September 2018 CT abdomen and pelvis showed a left mesenteric node 1.4 x1.5cm.  Today 7/10/23 he reports feeling overall fine, still works as Dentist in [place], [place]. His family lives in [place]. He comes back in the weekend. He loses 20lbs in one and a half years by intermittent fasting. Denies any gross hematuria and abdominal pain.       Interval History: 8/11/23 - 7/10/23 CT chest with IV contrast; CT abd/p urogram with IV contrast showing Extensive carcinomatosis including a small focus of soft tissue herniating through the anterior abdominal wall likely small focal hernia. Etiology for carcinomatosis not clearly delineated. Small pulmonary nodule unclear significance. Bilateral renal cysts, including large right renal cyst measuring 8 cm; no hydronephrosis. No solid renal masses. -S/p IR ultrasound guided left upper quadrant omental mass core biopsy 7/28/23 with pathology showing positive for MALIGNANT CELLS: Mucinous carcinoma, low grade, G1.  He denies N/V/D, constipation, abd pain, melena/hematochezia, fever/chills, night sweats. Denied hematuria. Appetite is ok. He refuses vital signs during the visit.  11/3/23: -he was admitted to [facility] 10/6/23-10/18/23: scheduled surgery for malignant neoplasm of RP on 10/6/23; S/p ex lap with omentectomy and repair of serosa for secondary malignant neoplasm of the retroperitoneum. Planned HIPEC and further debulking abandoned due to patient cardiac arrhythmias (ventricular bigeminy) and need for pressors in OR.(Surg Dr. 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 Postop changes of the abdomen and pelvis. No evidence of bowel obstruction or abscess. There is a new enhancing lesion anterior to the pancreas, suggestive of new peritoneal implant, new since prior study on 7/10/2023. Residual peritoneal implant in the left upper quadrant mesentery and omentum with interval decrease in size. lab on 10/18/23 Hb 9.2; plt 453; WBC 8.9 Ca 7.7; Cr 0.91; He and his wife report that he feels fatigue has been improving. Walks with a cane. Denied N/V/D, admits intermittent mild abd pain.  12/22/23 Pt is accompanied with his daughter Kelley who is a pediatrician. Pt feels overall fine, denies fatigue, abd pain, N/V/Diarrhea/constipation, night sweats. Good appetite, 1-2 regular bowel movements. 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    [de-identified] : 5/24/24 --Repeated CT c/abd/p with contrast Jan 5th 2024: Interval resolution of previously seen mass like infiltration anterior to the pancreas, which may have represented postoperative hematoma. 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 *  Stable 5 mm right middle lobe pulmonary nodule. he denies persistent abd pain, N/V/D, weight loss, changes of appetite. Occasional constipation. good energy level.

## 2024-06-04 ENCOUNTER — APPOINTMENT (OUTPATIENT)
Dept: SURGERY | Facility: CLINIC | Age: 74
End: 2024-06-04
Payer: MEDICARE

## 2024-06-04 DIAGNOSIS — C80.1 MALIGNANT (PRIMARY) NEOPLASM, UNSPECIFIED: ICD-10-CM

## 2024-06-04 DIAGNOSIS — C78.6 SECONDARY MALIGNANT NEOPLASM OF RETROPERITONEUM AND PERITONEUM: ICD-10-CM

## 2024-06-04 PROCEDURE — 99443: CPT

## 2024-06-05 ENCOUNTER — RX RENEWAL (OUTPATIENT)
Age: 74
End: 2024-06-05

## 2024-06-06 ENCOUNTER — RX RENEWAL (OUTPATIENT)
Age: 74
End: 2024-06-06

## 2024-06-09 PROBLEM — C80.1 MUCINOUS CARCINOMA: Status: ACTIVE | Noted: 2023-08-12

## 2024-06-09 PROBLEM — C78.6 PERITONEAL CARCINOMATOSIS: Status: ACTIVE | Noted: 2023-07-10

## 2024-06-09 NOTE — HISTORY OF PRESENT ILLNESS
[de-identified] : Shiva Arroyo is a 73 years old male with past medical history of HFrEF with AICD (LVEF 45-50% on 10/8/23 TTE), CAD s/p stent 2008 and 2018 on aspirin, Afib on coumadin, dyslipidemia and B12 deficiency. 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 He had the initial medical oncology consultation on 7/10/23  9/24/2018 appendiceal perforation s/p appendectomy, incidentally found to have appendiceal mucinous neoplasm,pT4a, grade 1.  The initial medical oncology consultation on 7/10/23: He presented with gross hematuria for two days in the end of June 2023. It was resolved after stopping coumadin. He restarted coumadin 3 days after its discontinuation.  6/24/23 CT renal stone hunt showed left upper quadrant mesenteric and omental nodules suspicious of carcinomatosis, a few nodules in the deep pelvis. of note, In September 2018 CT abdomen and pelvis showed a left mesenteric node 1.4 x1.5cm.   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 His family lives in [place]. He comes back in the weekend. He loses 20lbs in one and a half years by intermittent fasting. Denies any gross hematuria and abdominal pain.    Interval History: 8/11/23 - 7/10/23 CT chest with IV contrast; CT abd/p urogram with IV contrast showing Extensive carcinomatosis including a small focus of soft tissue herniating through the anterior abdominal wall likely small focal hernia. Etiology for carcinomatosis not clearly delineated. Small pulmonary nodule unclear significance. Bilateral renal cysts, including large right renal cyst measuring 8 cm; no hydronephrosis. No solid renal masses. -S/p IR ultrasound guided left upper quadrant omental mass core biopsy 7/28/23 with pathology showing positive for MALIGNANT CELLS: Mucinous carcinoma, low grade, G1.  He denies N/V/D, constipation, abd pain, melena/hematochezia, fever/chills, night sweats. Denied hematuria. Appetite is ok. He refuses vital signs during the visit.  11/3/23: -he was admitted to [facility] 10/6/23-10/18/23: scheduled surgery for malignant neoplasm of RP on 10/6/23; S/p ex lap with omentectomy and repair of serosa for secondary malignant neoplasm of the retroperitoneum. Planned HIPEC and further debulking abandoned due to patient cardiac arrhythmias (ventricular bigeminy) and need for pressors in OR.(Surg Dr. 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 Postop changes of the abdomen and pelvis. No evidence of bowel obstruction or abscess. There is a new enhancing lesion anterior to the pancreas, suggestive of new peritoneal implant, new since prior study on 7/10/2023. Residual peritoneal implant in the left upper quadrant mesentery and omentum with interval decrease in size. lab on 10/18/23 Hb 9.2; plt 453; WBC 8.9 Ca 7.7; Cr 0.91; He and his wife report that he feels fatigue has been improving. Walks with a cane. Denied N/V/D, admits intermittent mild abd pain.  12/22/23 Pt is accompanied with his daughter Kelley who is a pediatrician. Pt feels overall fine, denies fatigue, abd pain, N/V/Diarrhea/constipation, night sweats. Good appetite, 1-2 regular bowel movements. He continues to work at his office in [facility].       Interval History: 5/24/24 --Repeated CT c/abd/p with contrast Jan 5th 2024: Interval resolution of previously seen mass like infiltration anterior to the pancreas, which may have represented postoperative hematoma. 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 * Stable 5 mm right middle lobe pulmonary nodule. he denies persistent abd pain, N/V/D, weight loss, changes of appetite. Occasional constipation. good energy level.   06/04/2024 : no new reported symptoms, exam is unchanged,telehealth via phone, discussed his care with dr Cordell Silver who stated that his disease is extensive but second attempt for resection to be considered cautiously given his general condition and cardiac status will wait for coming CT to decide

## 2024-06-09 NOTE — ASSESSMENT
[FreeTextEntry1] : Shiva Arroyo is a 73 years old male with past medical history of HFrEF with AICD (LVEF 45-50% on 10/8/23 TTE), CAD s/p stent 2008 and 2018 on aspirin, Afib on coumadin, dyslipidemia and B12 deficiency. 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 He had the initial medical oncology consultation on 7/10/23  9/24/2018 appendiceal perforation s/p appendectomy, incidentally found to have appendiceal mucinous neoplasm,pT4a, grade 1.  The initial medical oncology consultation on 7/10/23: He presented with gross hematuria for two days in the end of June 2023. It was resolved after stopping coumadin. He restarted coumadin 3 days after its discontinuation.  6/24/23 CT renal stone hunt showed left upper quadrant mesenteric and omental nodules suspicious of carcinomatosis, a few nodules in the deep pelvis. of note, In September 2018 CT abdomen and pelvis showed a left mesenteric node 1.4 x1.5cm.   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 His family lives in [place]. He comes back in the weekend. He loses 20lbs in one and a half years by intermittent fasting. Denies any gross hematuria and abdominal pain.    Interval History: 8/11/23 - 7/10/23 CT chest with IV contrast; CT abd/p urogram with IV contrast showing Extensive carcinomatosis including a small focus of soft tissue herniating through the anterior abdominal wall likely small focal hernia. Etiology for carcinomatosis not clearly delineated. Small pulmonary nodule unclear significance. Bilateral renal cysts, including large right renal cyst measuring 8 cm; no hydronephrosis. No solid renal masses. -S/p IR ultrasound guided left upper quadrant omental mass core biopsy 7/28/23 with pathology showing positive for MALIGNANT CELLS: Mucinous carcinoma, low grade, G1.  He denies N/V/D, constipation, abd pain, melena/hematochezia, fever/chills, night sweats. Denied hematuria. Appetite is ok. He refuses vital signs during the visit.  11/3/23: -he was admitted to [facility] 10/6/23-10/18/23: scheduled surgery for malignant neoplasm of RP on 10/6/23; S/p ex lap with omentectomy and repair of serosa for secondary malignant neoplasm of the retroperitoneum. Planned HIPEC and further debulking abandoned due to patient cardiac arrhythmias (ventricular bigeminy) and need for pressors in OR.(Surg Dr. 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 Postop changes of the abdomen and pelvis. No evidence of bowel obstruction or abscess. There is a new enhancing lesion anterior to the pancreas, suggestive of new peritoneal implant, new since prior study on 7/10/2023. Residual peritoneal implant in the left upper quadrant mesentery and omentum with interval decrease in size. lab on 10/18/23 Hb 9.2; plt 453; WBC 8.9 Ca 7.7; Cr 0.91; He and his wife report that he feels fatigue has been improving. Walks with a cane. Denied N/V/D, admits intermittent mild abd pain.  12/22/23 Pt is accompanied with his daughter Kelley who is a pediatrician. Pt feels overall fine, denies fatigue, abd pain, N/V/Diarrhea/constipation, night sweats. Good appetite, 1-2 regular bowel movements. 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       Interval History: 5/24/24 --Repeated CT c/abd/p with contrast Jan 5th 2024: Interval resolution of previously seen mass like infiltration anterior to the pancreas, which may have represented postoperative hematoma. 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 * Stable 5 mm right middle lobe pulmonary nodule. he denies persistent abd pain, N/V/D, weight loss, changes of appetite. Occasional constipation. good energy level.   06/04/2024 : no new reported symptoms, exam is unchanged,telehealth via phone, discussed his care with dr Cordell Silver who stated that his disease is extensive but second attempt for resection to be considered cautiously given his general condition and cardiac status will wait for coming CT to decide  the above plan of care with discussed in details to the patient and all questions were answered to patient satisfaction. patient instructed to follow up with the referring physician and patient primary care provider   A total of 25 minutes was spent on this visit, obtaining h/p,  reviewing previous notes/imaging, counseling the patient on f/u , ordering tests (below), and documenting the findings in the note.

## 2024-06-21 ENCOUNTER — APPOINTMENT (OUTPATIENT)
Dept: FAMILY MEDICINE | Facility: CLINIC | Age: 74
End: 2024-06-21
Payer: MEDICARE

## 2024-06-21 VITALS — TEMPERATURE: 97.4 F | BODY MASS INDEX: 27.29 KG/M2 | WEIGHT: 154 LBS | HEIGHT: 63 IN

## 2024-06-21 VITALS
SYSTOLIC BLOOD PRESSURE: 113 MMHG | RESPIRATION RATE: 16 BRPM | DIASTOLIC BLOOD PRESSURE: 77 MMHG | HEART RATE: 76 BPM | OXYGEN SATURATION: 96 %

## 2024-06-21 DIAGNOSIS — E53.8 DEFICIENCY OF OTHER SPECIFIED B GROUP VITAMINS: ICD-10-CM

## 2024-06-21 DIAGNOSIS — C78.6 SECONDARY MALIGNANT NEOPLASM OF RETROPERITONEUM AND PERITONEUM: ICD-10-CM

## 2024-06-21 DIAGNOSIS — Z00.00 ENCOUNTER FOR GENERAL ADULT MEDICAL EXAMINATION W/OUT ABNORMAL FINDINGS: ICD-10-CM

## 2024-06-21 PROCEDURE — 36415 COLL VENOUS BLD VENIPUNCTURE: CPT

## 2024-06-21 PROCEDURE — G0439: CPT

## 2024-06-21 RX ORDER — CYANOCOBALAMIN 1000 UG/ML
1000 INJECTION INTRAMUSCULAR; SUBCUTANEOUS
Qty: 1 | Refills: 1 | Status: ACTIVE | COMMUNITY
Start: 1900-01-01 | End: 1900-01-01

## 2024-06-21 NOTE — HEALTH RISK ASSESSMENT
[No] : No [No falls in past year] : Patient reported no falls in the past year [Former] : Former [# of Members in Household ___] :  household currently consist of [unfilled] member(s) [Employed] : employed [] :  [# Of Children ___] : has [unfilled] children [de-identified] : Varied diet [de-identified] : Quit 40 years ago  [FreeTextEntry2] : 3 days a week  [de-identified] : Eye Exam-needs to make appointment-2 years-Dr. Bruno

## 2024-06-21 NOTE — PLAN
[FreeTextEntry1] : Just had coffee this AM.  Afib on Coumadin-check INR.  B12 deficiency-check levels.  HTN-check CMP. Check lipid panel.   Has Cardiology appointment scheduled.   HM-check PSA Will adjust meds based on labs.  Patient expressed understanding of plan.

## 2024-06-21 NOTE — PHYSICAL EXAM
[No Acute Distress] : no acute distress [Normal Sclera/Conjunctiva] : normal sclera/conjunctiva [PERRL] : pupils equal round and reactive to light [EOMI] : extraocular movements intact [Normal Oropharynx] : the oropharynx was normal [Normal TMs] : both tympanic membranes were normal [No Lymphadenopathy] : no lymphadenopathy [Thyroid Normal, No Nodules] : the thyroid was normal and there were no nodules present [No Edema] : there was no peripheral edema [No Extremity Clubbing/Cyanosis] : no extremity clubbing/cyanosis [Soft] : abdomen soft [Non Tender] : non-tender [No Masses] : no abdominal mass palpated [Normal Bowel Sounds] : normal bowel sounds [Normal] : affect was normal and insight and judgment were intact [de-identified] : surgical scar right abdomen and midline  [de-identified] : Saw Podiatry last month

## 2024-06-21 NOTE — HISTORY OF PRESENT ILLNESS
[FreeTextEntry1] : Mr. ARMSTRONG presents for annual physical. [de-identified] : Mr. ARMSTRONG presents for annual physical.  Planning for CT scan with Dr. Cam-Surgical Oncology.  Sometimes feeling discomfort near surgical site. Not every day-no bloating.  BMs have been pretty regular.  Seeing Cardiology in September.     Taking Iron, Vit D3, MVI Does his B12 shots.  Working 3 days a week-M,Tues, Wednesday Energy level doing okay.  Sleeping 9PM-5AM. Waking up 1 time to void.

## 2024-06-21 NOTE — REVIEW OF SYSTEMS
[Nasal Discharge] : nasal discharge [Fever] : no fever [Chills] : no chills [Sore Throat] : no sore throat [Chest Pain] : no chest pain [Palpitations] : no palpitations [Shortness Of Breath] : no shortness of breath [Wheezing] : no wheezing [Cough] : no cough [Constipation] : no constipation [Diarrhea] : no diarrhea [Vomiting] : no vomiting [Heartburn] : no heartburn [Melena] : no melena [Dysuria] : no dysuria [Hematuria] : no hematuria [Headache] : no headache [Dizziness] : no dizziness [FreeTextEntry4] : runny nose in AM- has Astelin [FreeTextEntry6] : Difficulty if going uphill [FreeTextEntry7] : no bloating-surgical site irritation [de-identified] : Waking 1 time a night to void

## 2024-06-23 DIAGNOSIS — I48.0 PAROXYSMAL ATRIAL FIBRILLATION: ICD-10-CM

## 2024-06-23 LAB
25(OH)D3 SERPL-MCNC: 51.9 NG/ML
ALBUMIN SERPL ELPH-MCNC: 4.2 G/DL
ALP BLD-CCNC: 67 U/L
ALT SERPL-CCNC: 18 U/L
ANION GAP SERPL CALC-SCNC: 12 MMOL/L
AST SERPL-CCNC: 21 U/L
BASOPHILS # BLD AUTO: 0.02 K/UL
BASOPHILS NFR BLD AUTO: 0.5 %
BILIRUB SERPL-MCNC: 0.8 MG/DL
BUN SERPL-MCNC: 19 MG/DL
CALCIUM SERPL-MCNC: 9.1 MG/DL
CHLORIDE SERPL-SCNC: 102 MMOL/L
CHOLEST SERPL-MCNC: 160 MG/DL
CO2 SERPL-SCNC: 23 MMOL/L
CREAT SERPL-MCNC: 0.97 MG/DL
EGFR: 82 ML/MIN/1.73M2
EOSINOPHIL # BLD AUTO: 0.16 K/UL
EOSINOPHIL NFR BLD AUTO: 4.3 %
ESTIMATED AVERAGE GLUCOSE: 120 MG/DL
FOLATE SERPL-MCNC: >20 NG/ML
GLUCOSE SERPL-MCNC: 125 MG/DL
HBA1C MFR BLD HPLC: 5.8 %
HCT VFR BLD CALC: 41.7 %
HDLC SERPL-MCNC: 47 MG/DL
HGB BLD-MCNC: 13.4 G/DL
IMM GRANULOCYTES NFR BLD AUTO: 0.3 %
INR PPP: 1.97 RATIO
LDLC SERPL CALC-MCNC: 86 MG/DL
LYMPHOCYTES # BLD AUTO: 0.79 K/UL
LYMPHOCYTES NFR BLD AUTO: 21.2 %
MAN DIFF?: NORMAL
MCHC RBC-ENTMCNC: 29 PG
MCHC RBC-ENTMCNC: 32.1 GM/DL
MCV RBC AUTO: 90.3 FL
MONOCYTES # BLD AUTO: 0.36 K/UL
MONOCYTES NFR BLD AUTO: 9.7 %
NEUTROPHILS # BLD AUTO: 2.38 K/UL
NEUTROPHILS NFR BLD AUTO: 64 %
NONHDLC SERPL-MCNC: 113 MG/DL
PLATELET # BLD AUTO: 217 K/UL
POTASSIUM SERPL-SCNC: 4.5 MMOL/L
PROT SERPL-MCNC: 7.5 G/DL
PSA SERPL-MCNC: 0.44 NG/ML
PT BLD: 22 SEC
RBC # BLD: 4.62 M/UL
RBC # FLD: 15 %
SODIUM SERPL-SCNC: 137 MMOL/L
TRIGL SERPL-MCNC: 156 MG/DL
TSH SERPL-ACNC: 1.34 UIU/ML
VIT B12 SERPL-MCNC: 417 PG/ML
WBC # FLD AUTO: 3.72 K/UL

## 2024-06-24 ENCOUNTER — RX RENEWAL (OUTPATIENT)
Age: 74
End: 2024-06-24

## 2024-07-01 ENCOUNTER — NON-APPOINTMENT (OUTPATIENT)
Age: 74
End: 2024-07-01

## 2024-07-02 ENCOUNTER — TRANSCRIPTION ENCOUNTER (OUTPATIENT)
Age: 74
End: 2024-07-02

## 2024-07-03 ENCOUNTER — TRANSCRIPTION ENCOUNTER (OUTPATIENT)
Age: 74
End: 2024-07-03

## 2024-07-10 ENCOUNTER — RX RENEWAL (OUTPATIENT)
Age: 74
End: 2024-07-10

## 2024-07-11 ENCOUNTER — TRANSCRIPTION ENCOUNTER (OUTPATIENT)
Age: 74
End: 2024-07-11

## 2024-07-12 ENCOUNTER — OUTPATIENT (OUTPATIENT)
Dept: OUTPATIENT SERVICES | Facility: HOSPITAL | Age: 74
LOS: 1 days | End: 2024-07-12
Payer: MEDICARE

## 2024-07-12 ENCOUNTER — APPOINTMENT (OUTPATIENT)
Dept: CT IMAGING | Facility: IMAGING CENTER | Age: 74
End: 2024-07-12
Payer: MEDICARE

## 2024-07-12 DIAGNOSIS — Z95.5 PRESENCE OF CORONARY ANGIOPLASTY IMPLANT AND GRAFT: Chronic | ICD-10-CM

## 2024-07-12 DIAGNOSIS — Z95.810 PRESENCE OF AUTOMATIC (IMPLANTABLE) CARDIAC DEFIBRILLATOR: Chronic | ICD-10-CM

## 2024-07-12 DIAGNOSIS — S82.90XA UNSPECIFIED FRACTURE OF UNSPECIFIED LOWER LEG, INITIAL ENCOUNTER FOR CLOSED FRACTURE: Chronic | ICD-10-CM

## 2024-07-12 DIAGNOSIS — Z98.890 OTHER SPECIFIED POSTPROCEDURAL STATES: Chronic | ICD-10-CM

## 2024-07-12 DIAGNOSIS — Z90.49 ACQUIRED ABSENCE OF OTHER SPECIFIED PARTS OF DIGESTIVE TRACT: Chronic | ICD-10-CM

## 2024-07-12 DIAGNOSIS — C78.6 SECONDARY MALIGNANT NEOPLASM OF RETROPERITONEUM AND PERITONEUM: ICD-10-CM

## 2024-07-12 PROCEDURE — 74177 CT ABD & PELVIS W/CONTRAST: CPT | Mod: 26

## 2024-07-12 PROCEDURE — 71260 CT THORAX DX C+: CPT

## 2024-07-12 PROCEDURE — 74177 CT ABD & PELVIS W/CONTRAST: CPT

## 2024-07-12 PROCEDURE — 71260 CT THORAX DX C+: CPT | Mod: 26

## 2024-07-23 ENCOUNTER — RX RENEWAL (OUTPATIENT)
Age: 74
End: 2024-07-23

## 2024-08-13 ENCOUNTER — TRANSCRIPTION ENCOUNTER (OUTPATIENT)
Age: 74
End: 2024-08-13

## 2024-08-15 ENCOUNTER — APPOINTMENT (OUTPATIENT)
Dept: FAMILY MEDICINE | Facility: CLINIC | Age: 74
End: 2024-08-15

## 2024-09-07 ENCOUNTER — RX RENEWAL (OUTPATIENT)
Age: 74
End: 2024-09-07

## 2024-09-13 ENCOUNTER — APPOINTMENT (OUTPATIENT)
Dept: HEMATOLOGY ONCOLOGY | Facility: CLINIC | Age: 74
End: 2024-09-13
Payer: MEDICARE

## 2024-09-13 ENCOUNTER — OUTPATIENT (OUTPATIENT)
Dept: OUTPATIENT SERVICES | Facility: HOSPITAL | Age: 74
LOS: 1 days | Discharge: HOME | End: 2024-09-13

## 2024-09-13 VITALS
RESPIRATION RATE: 16 BRPM | WEIGHT: 155.62 LBS | SYSTOLIC BLOOD PRESSURE: 121 MMHG | OXYGEN SATURATION: 98 % | TEMPERATURE: 97.6 F | BODY MASS INDEX: 27.57 KG/M2 | HEART RATE: 80 BPM | DIASTOLIC BLOOD PRESSURE: 80 MMHG

## 2024-09-13 DIAGNOSIS — Z95.5 PRESENCE OF CORONARY ANGIOPLASTY IMPLANT AND GRAFT: Chronic | ICD-10-CM

## 2024-09-13 DIAGNOSIS — Z90.49 ACQUIRED ABSENCE OF OTHER SPECIFIED PARTS OF DIGESTIVE TRACT: Chronic | ICD-10-CM

## 2024-09-13 DIAGNOSIS — C78.6 SECONDARY MALIGNANT NEOPLASM OF RETROPERITONEUM AND PERITONEUM: ICD-10-CM

## 2024-09-13 DIAGNOSIS — S82.90XA UNSPECIFIED FRACTURE OF UNSPECIFIED LOWER LEG, INITIAL ENCOUNTER FOR CLOSED FRACTURE: Chronic | ICD-10-CM

## 2024-09-13 DIAGNOSIS — K92.2 GASTROINTESTINAL HEMORRHAGE, UNSPECIFIED: ICD-10-CM

## 2024-09-13 DIAGNOSIS — C80.1 MALIGNANT (PRIMARY) NEOPLASM, UNSPECIFIED: ICD-10-CM

## 2024-09-13 DIAGNOSIS — Z95.810 PRESENCE OF AUTOMATIC (IMPLANTABLE) CARDIAC DEFIBRILLATOR: Chronic | ICD-10-CM

## 2024-09-13 DIAGNOSIS — Z98.890 OTHER SPECIFIED POSTPROCEDURAL STATES: Chronic | ICD-10-CM

## 2024-09-13 PROCEDURE — 99214 OFFICE O/P EST MOD 30 MIN: CPT

## 2024-09-13 PROCEDURE — G2211 COMPLEX E/M VISIT ADD ON: CPT

## 2024-09-13 NOTE — REASON FOR VISIT
[Follow-Up Visit] : a follow-up [FreeTextEntry2] : peritoneal carcinomatosis; mucinous adenocarcinoma low grade.

## 2024-09-13 NOTE — ASSESSMENT
[FreeTextEntry1] : 73 years old male who had the initial medical oncology consultation on 7/10/23. He initially presented with one episode of gross hematuria, on 6/24/23 CT renal stone hunt incidentally revealed numerous soft tissue nodules on left mesenteric fat and omental nodules. of note, In September 2018 CT abdomen and pelvis showed a left mesenteric node 1.5x1.4 cm, however, no obvious other lesions to suggest primary site.  He had history of left inguinal hernia for several years s/p 8/26/19 left inguinal hernia repair with medium oval. and he followed up with surgery Dr. Tam for that hernia repair.  To be noted, per pathology report, he had appendectomy due to perforated appendicitis on 9/23/2018 at [facility] with pathology showing: Appendix, (laparoscopic appendectomy): Low-grade appendiceal mucinous neoplasm in a background of perforation, acute and chronic appendicitis, extensive ulceration, dense appendiceal wall fibrosis and acute periappendicitis: Focal extra-appendiceal acellular mucin is identified. Primary Tumor pT4a: Acellular mucin focally involving the serosa of the appendix or mesoappendix in background of perforation (slide 1M1) (pT4a). Resection margin negative for dysplasia and malignancy. lymph-vascular invasion and tmor deposits not identified. no regional lymph nodes submitted or found.   Given incidental findings on scans, he will need better images CT urogram and CT chest to complete staging work up given his gross hematuria history although he was on coumadin. He will need to be arranged for IR guided a biopsy for his left upper quadrant mesenteric and omental lesions.  - 7/10/23 CT chest with IV contrast; CT abd/p urogram with IV contrast showing Extensive carcinomatosis including a small focus of soft tissue herniating through the anterior abdominal wall likely small focal hernia. Etiology for carcinomatosis not clearly delineated. Small pulmonary nodule unclear significance. Bilateral renal cysts, including large right renal cyst measuring 8 cm; no hydronephrosis. No solid renal masses.  -S/p IR ultrasound guided left upper quadrant omental mass core biopsy 7/28/23 with pathology showing positive for MALIGNANT CELLS: Mucinous carcinoma, low grade, Grade 1.  8/11/23 Discussed with pt and his wife, and also his daughter Jason who is a peditrician on the phone about the pathology result from omental mass core biopsy on 7/28/23. In view of his grade 1 metastatic adenocarcinoma, We recommend local therapy including CRS (debulking) by Dr. Silver and HIPEC (Hyperthermic intraperitoneal chemotherapy) during the surgery. After the surgery, will repeat CT and if there still disease there, may consider Radiation therapy if residual diseases are found and potential chemotherapy following with surveillance scans.  he was admitted to [facility] 10/6/23-10/18/23: scheduled surgery for malignant neoplasm of RP; S/p ex lap with omentectomy on 10/6, Planned HIPEC and further debulking abandoned due to cardiac arrhythmias (ventricular bigeminy) and need for pressors in OR(Surg Dr. Silver). 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  -10/13/23 CT abd/p with IV contrast showing Postop changes of the abdomen and pelvis. No evidence of bowel obstruction or abscess. There is a new enhancing lesion anterior to the pancreas, suggestive of new peritoneal implant, new since prior study on 7/10/2023. Residual peritoneal implant in the left upper quadrant mesentery and omentum with interval decrease in size.  7/12/24 CT c/a/p showed Unchanged 5 mm right middle lobe pulmonary nodules. No new pulmonary nodules visualized. Extensive peritoneal implants/carcinomatosis, increased compared with the prior study with new implants noted.  Plan -11/3/23 Discussed with pt and his wife, his daughter Kelley on the phone about the pathology and image result, recommend local treatment with radiation for now since surgery was abortive due to dysrhythmia. No rush for systemic chemo in view of low grade mucinous carcinoma. However he will be referred to Dr. Davis Kerr to see if radiation is feasible. Alternatively, capecitabine will be considered if radiation is unavailable. Reviewed the most common side effects of capecitabine including but not limited to nausea, hand foot syndrome, fatigue, diarrhea, low blood counts due to marrow suppression. -12/22/23 reviewed with pt and his daughter Kelley about the CT abd in 10/2023. Unclear if the new enhancing lesion anterior to the pancreas is related to post-op changes vs less likely malignancy. Will discuss with Dr. Silver after images in January. - -Repeated CT c/abd/p with contrast on Jan 5th 2024: Interval resolution of previously seen mass like infiltration anterior to the pancreas, which may have represented postoperative hematoma. 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 *  Stable 5 mm right middle lobe pulmonary nodule. -reviewed July, 2024 CT scan in detail, he has extensive peritoneal carcinomatosis with increased compared with the prior study with multiple new implants noted, confluence carcinomatosis seen in the left upper quadrant measures approximately 4.6 cm in width on the current study, previously measuring 3.9 cm I had a lengthy discussion with the patient and his daughter regarding his progression of disease and further management. I strongly recommended starting XELOX vs FOLFOX as standard for mucinous carcinoma to control disease progression which may cause ascites and abdominal distention and pain. The potential AEs from chemotherapy include but are not limited to fatigue, nausea, vomiting, diarrhea, constipation, low blood cell count, neuropathy, risk of infection, hand foot syndrome. If he is on oxaliplatin, he will need to place a mediport and avoid cold food and drink. Their many questions were answered. However, they will think about it. Patient will have CT scans in October to understand his cancer status.  -Foundation one from surgery sample: no targetable mutations found -lab on 11/3/23 Hb 11.2 improved comparing to 10/18/23 Hb 9.1; Cr wnl; -he will have lab from PCP soon; will f/u lab result -continue f/u with Onc surgery  RTC in 2 months.

## 2024-09-13 NOTE — HISTORY OF PRESENT ILLNESS
[de-identified] : Shiva Arroyo is a 73 years old male with past medical history of HFrEF with AICD (LVEF 45-50% on 10/8/23 TTE), CAD s/p stent 2008 and 2018 on aspirin, Afib on coumadin, dyslipidemia and B12 deficiency. 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 He had the initial medical oncology consultation on 7/10/23   9/24/2018 appendiceal perforation s/p appendectomy, incidentally found to have appendiceal mucinous neoplasm,pT4a, grade 1.  The initial medical oncology consultation on 7/10/23: He presented with gross hematuria for two days in the end of June 2023. It was resolved after stopping coumadin. He restarted coumadin 3 days after its discontinuation.  6/24/23 CT renal stone hunt showed left upper quadrant mesenteric and omental nodules suspicious of carcinomatosis, a few nodules in the deep pelvis. of note, In September 2018 CT abdomen and pelvis showed a left mesenteric node 1.4 x1.5cm.  Today 7/10/23 he reports feeling overall fine, still works as Dentist in [place], [place]. His family lives in [place]. He comes back in the weekend. He loses 20lbs in one and a half years by intermittent fasting. Denies any gross hematuria and abdominal pain.       Interval History: 8/11/23 - 7/10/23 CT chest with IV contrast; CT abd/p urogram with IV contrast showing Extensive carcinomatosis including a small focus of soft tissue herniating through the anterior abdominal wall likely small focal hernia. Etiology for carcinomatosis not clearly delineated. Small pulmonary nodule unclear significance. Bilateral renal cysts, including large right renal cyst measuring 8 cm; no hydronephrosis. No solid renal masses. -S/p IR ultrasound guided left upper quadrant omental mass core biopsy 7/28/23 with pathology showing positive for MALIGNANT CELLS: Mucinous carcinoma, low grade, G1.  He denies N/V/D, constipation, abd pain, melena/hematochezia, fever/chills, night sweats. Denied hematuria. Appetite is ok. He refuses vital signs during the visit.  11/3/23: -he was admitted to [facility] 10/6/23-10/18/23: scheduled surgery for malignant neoplasm of RP on 10/6/23; S/p ex lap with omentectomy and repair of serosa for secondary malignant neoplasm of the retroperitoneum. Planned HIPEC and further debulking abandoned due to patient cardiac arrhythmias (ventricular bigeminy) and need for pressors in OR.(Surg Dr. 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 Postop changes of the abdomen and pelvis. No evidence of bowel obstruction or abscess. There is a new enhancing lesion anterior to the pancreas, suggestive of new peritoneal implant, new since prior study on 7/10/2023. Residual peritoneal implant in the left upper quadrant mesentery and omentum with interval decrease in size. lab on 10/18/23 Hb 9.2; plt 453; WBC 8.9 Ca 7.7; Cr 0.91; He and his wife report that he feels fatigue has been improving. Walks with a cane. Denied N/V/D, admits intermittent mild abd pain.  12/22/23 Pt is accompanied with his daughter Kelley who is a pediatrician. Pt feels overall fine, denies fatigue, abd pain, N/V/Diarrhea/constipation, night sweats. Good appetite, 1-2 regular bowel movements. 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    [de-identified] : 5/24/24 --Repeated CT c/abd/p with contrast Jan 5th 2024: Interval resolution of previously seen mass like infiltration anterior to the pancreas, which may have represented postoperative hematoma. 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 *  Stable 5 mm right middle lobe pulmonary nodule. he denies persistent abd pain, N/V/D, weight loss, changes of appetite. Occasional constipation. good energy level.   7/12/24 CT c/a/p showed Unchanged 5 mm right middle lobe pulmonary nodules. No new pulmonary nodules visualized. Extensive peritoneal implants/carcinomatosis, increased compared with the prior study with new implants noted.  9/13/24 feels overall fine, works as usual, eats normally, denies abdominal pain and nausea/vomiting/diarrhea.

## 2024-09-13 NOTE — HISTORY OF PRESENT ILLNESS
[de-identified] : Shiva Arroyo is a 73 years old male with past medical history of HFrEF with AICD (LVEF 45-50% on 10/8/23 TTE), CAD s/p stent 2008 and 2018 on aspirin, Afib on coumadin, dyslipidemia and B12 deficiency. 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 He had the initial medical oncology consultation on 7/10/23   9/24/2018 appendiceal perforation s/p appendectomy, incidentally found to have appendiceal mucinous neoplasm,pT4a, grade 1.  The initial medical oncology consultation on 7/10/23: He presented with gross hematuria for two days in the end of June 2023. It was resolved after stopping coumadin. He restarted coumadin 3 days after its discontinuation.  6/24/23 CT renal stone hunt showed left upper quadrant mesenteric and omental nodules suspicious of carcinomatosis, a few nodules in the deep pelvis. of note, In September 2018 CT abdomen and pelvis showed a left mesenteric node 1.4 x1.5cm.  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 His family lives in [place]. He comes back in the weekend. He loses 20lbs in one and a half years by intermittent fasting. Denies any gross hematuria and abdominal pain.       Interval History: 8/11/23 - 7/10/23 CT chest with IV contrast; CT abd/p urogram with IV contrast showing Extensive carcinomatosis including a small focus of soft tissue herniating through the anterior abdominal wall likely small focal hernia. Etiology for carcinomatosis not clearly delineated. Small pulmonary nodule unclear significance. Bilateral renal cysts, including large right renal cyst measuring 8 cm; no hydronephrosis. No solid renal masses. -S/p IR ultrasound guided left upper quadrant omental mass core biopsy 7/28/23 with pathology showing positive for MALIGNANT CELLS: Mucinous carcinoma, low grade, G1.  He denies N/V/D, constipation, abd pain, melena/hematochezia, fever/chills, night sweats. Denied hematuria. Appetite is ok. He refuses vital signs during the visit.  11/3/23: -he was admitted to [facility] 10/6/23-10/18/23: scheduled surgery for malignant neoplasm of RP on 10/6/23; S/p ex lap with omentectomy and repair of serosa for secondary malignant neoplasm of the retroperitoneum. Planned HIPEC and further debulking abandoned due to patient cardiac arrhythmias (ventricular bigeminy) and need for pressors in OR.(Surg Dr. 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 Postop changes of the abdomen and pelvis. No evidence of bowel obstruction or abscess. There is a new enhancing lesion anterior to the pancreas, suggestive of new peritoneal implant, new since prior study on 7/10/2023. Residual peritoneal implant in the left upper quadrant mesentery and omentum with interval decrease in size. lab on 10/18/23 Hb 9.2; plt 453; WBC 8.9 Ca 7.7; Cr 0.91; He and his wife report that he feels fatigue has been improving. Walks with a cane. Denied N/V/D, admits intermittent mild abd pain.  12/22/23 Pt is accompanied with his daughter Kelley who is a pediatrician. Pt feels overall fine, denies fatigue, abd pain, N/V/Diarrhea/constipation, night sweats. Good appetite, 1-2 regular bowel movements. 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    [de-identified] : 5/24/24 --Repeated CT c/abd/p with contrast Jan 5th 2024: Interval resolution of previously seen mass like infiltration anterior to the pancreas, which may have represented postoperative hematoma. 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 *  Stable 5 mm right middle lobe pulmonary nodule. he denies persistent abd pain, N/V/D, weight loss, changes of appetite. Occasional constipation. good energy level.   7/12/24 CT c/a/p showed Unchanged 5 mm right middle lobe pulmonary nodules. No new pulmonary nodules visualized. Extensive peritoneal implants/carcinomatosis, increased compared with the prior study with new implants noted.  9/13/24 feels overall fine, works as usual, eats normally, denies abdominal pain and nausea/vomiting/diarrhea.

## 2024-09-13 NOTE — ASSESSMENT
[FreeTextEntry1] : 73 years old male who had the initial medical oncology consultation on 7/10/23. He initially presented with one episode of gross hematuria, on 6/24/23 CT renal stone hunt incidentally revealed numerous soft tissue nodules on left mesenteric fat and omental nodules. of note, In September 2018 CT abdomen and pelvis showed a left mesenteric node 1.5x1.4 cm, however, no obvious other lesions to suggest primary site.  He had history of left inguinal hernia for several years s/p 8/26/19 left inguinal hernia repair with medium oval. and he followed up with surgery Dr. Tam for that hernia repair.  To be noted, per pathology report, he had appendectomy due to perforated appendicitis on 9/23/2018 at [facility] with pathology showing: Appendix, (laparoscopic appendectomy): Low-grade appendiceal mucinous neoplasm in a background of perforation, acute and chronic appendicitis, extensive ulceration, dense appendiceal wall fibrosis and acute periappendicitis: Focal extra-appendiceal acellular mucin is identified. Primary Tumor pT4a: Acellular mucin focally involving the serosa of the appendix or mesoappendix in background of perforation (slide 1M1) (pT4a). Resection margin negative for dysplasia and malignancy. lymph-vascular invasion and tmor deposits not identified. no regional lymph nodes submitted or found.   Given incidental findings on scans, he will need better images CT urogram and CT chest to complete staging work up given his gross hematuria history although he was on coumadin. He will need to be arranged for IR guided a biopsy for his left upper quadrant mesenteric and omental lesions.  - 7/10/23 CT chest with IV contrast; CT abd/p urogram with IV contrast showing Extensive carcinomatosis including a small focus of soft tissue herniating through the anterior abdominal wall likely small focal hernia. Etiology for carcinomatosis not clearly delineated. Small pulmonary nodule unclear significance. Bilateral renal cysts, including large right renal cyst measuring 8 cm; no hydronephrosis. No solid renal masses.  -S/p IR ultrasound guided left upper quadrant omental mass core biopsy 7/28/23 with pathology showing positive for MALIGNANT CELLS: Mucinous carcinoma, low grade, Grade 1.  8/11/23 Discussed with pt and his wife, and also his daughter Jason who is a peditrician on the phone about the pathology result from omental mass core biopsy on 7/28/23. In view of his grade 1 metastatic adenocarcinoma, We recommend local therapy including CRS (debulking) by Dr. Silver and HIPEC (Hyperthermic intraperitoneal chemotherapy) during the surgery. After the surgery, will repeat CT and if there still disease there, may consider Radiation therapy if residual diseases are found and potential chemotherapy following with surveillance scans.  he was admitted to [facility] 10/6/23-10/18/23: scheduled surgery for malignant neoplasm of RP; S/p ex lap with omentectomy on 10/6, Planned HIPEC and further debulking abandoned due to cardiac arrhythmias (ventricular bigeminy) and need for pressors in OR(Surg Dr. Silver). 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  -10/13/23 CT abd/p with IV contrast showing Postop changes of the abdomen and pelvis. No evidence of bowel obstruction or abscess. There is a new enhancing lesion anterior to the pancreas, suggestive of new peritoneal implant, new since prior study on 7/10/2023. Residual peritoneal implant in the left upper quadrant mesentery and omentum with interval decrease in size.  7/12/24 CT c/a/p showed Unchanged 5 mm right middle lobe pulmonary nodules. No new pulmonary nodules visualized. Extensive peritoneal implants/carcinomatosis, increased compared with the prior study with new implants noted.  Plan -11/3/23 Discussed with pt and his wife, his daughter Kelley on the phone about the pathology and image result, recommend local treatment with radiation for now since surgery was abortive due to dysrhythmia. No rush for systemic chemo in view of low grade mucinous carcinoma. However he will be referred to Dr. Davis Kerr to see if radiation is feasible. Alternatively, capecitabine will be considered if radiation is unavailable. Reviewed the most common side effects of capecitabine including but not limited to nausea, hand foot syndrome, fatigue, diarrhea, low blood counts due to marrow suppression. -12/22/23 reviewed with pt and his daughter Kelley about the CT abd in 10/2023. Unclear if the new enhancing lesion anterior to the pancreas is related to post-op changes vs less likely malignancy. Will discuss with Dr. Silver after images in January. - -Repeated CT c/abd/p with contrast on Jan 5th 2024: Interval resolution of previously seen mass like infiltration anterior to the pancreas, which may have represented postoperative hematoma. 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 *  Stable 5 mm right middle lobe pulmonary nodule. -reviewed July, 2024 CT scan in detail, he has extensive peritoneal carcinomatosis with increased compared with the prior study with multiple new implants noted, confluence carcinomatosis seen in the left upper quadrant measures approximately 4.6 cm in width on the current study, previously measuring 3.9 cm I had a lengthy discussion with the patient and his daughter regarding his progression of disease and further management. I strongly recommended starting XELOX vs FOLFOX as standard for mucinous carcinoma to control disease progression which may cause ascites and abdominal distention and pain. The potential AEs from chemotherapy include but are not limited to fatigue, nausea, vomiting, diarrhea, constipation, low blood cell count, neuropathy, risk of infection, hand foot syndrome. If he is on oxaliplatin, he will need to place a mediport and avoid cold food and drink. Their many questions were answered. However, they will think about it. Patient will have CT scans in October to understand his cancer status.  -Foundation one from surgery sample: no targetable mutations found -lab on 11/3/23 Hb 11.2 improved comparing to 10/18/23 Hb 9.1; Cr wnl; -he will have lab from PCP soon; will f/u lab result -continue f/u with Onc surgery  RTC in 2 months.

## 2024-09-19 ENCOUNTER — NON-APPOINTMENT (OUTPATIENT)
Age: 74
End: 2024-09-19

## 2024-09-19 ENCOUNTER — APPOINTMENT (OUTPATIENT)
Dept: ELECTROPHYSIOLOGY | Facility: CLINIC | Age: 74
End: 2024-09-19
Payer: MEDICARE

## 2024-09-19 ENCOUNTER — APPOINTMENT (OUTPATIENT)
Dept: CARDIOLOGY | Facility: CLINIC | Age: 74
End: 2024-09-19

## 2024-09-19 VITALS
HEART RATE: 73 BPM | DIASTOLIC BLOOD PRESSURE: 78 MMHG | HEIGHT: 63 IN | SYSTOLIC BLOOD PRESSURE: 110 MMHG | BODY MASS INDEX: 27.29 KG/M2 | WEIGHT: 154 LBS | RESPIRATION RATE: 12 BRPM | OXYGEN SATURATION: 96 %

## 2024-09-19 DIAGNOSIS — I25.10 ATHEROSCLEROTIC HEART DISEASE OF NATIVE CORONARY ARTERY W/OUT ANGINA PECTORIS: ICD-10-CM

## 2024-09-19 PROCEDURE — 85610 PROTHROMBIN TIME: CPT | Mod: QW

## 2024-09-19 PROCEDURE — 93283 PRGRMG EVAL IMPLANTABLE DFB: CPT

## 2024-09-19 PROCEDURE — G2211 COMPLEX E/M VISIT ADD ON: CPT

## 2024-09-19 PROCEDURE — 99214 OFFICE O/P EST MOD 30 MIN: CPT

## 2024-09-19 PROCEDURE — 93000 ELECTROCARDIOGRAM COMPLETE: CPT

## 2024-09-19 PROCEDURE — 99213 OFFICE O/P EST LOW 20 MIN: CPT

## 2024-09-19 NOTE — HISTORY OF PRESENT ILLNESS
[FreeTextEntry1] : Patient is a 74 year old man with a history of CAD s/p drug-eluting stents to the mid LAD and ostial OM in March of 2008 and most recently status post DAPHNIE to the distal RCA on 11/9/2018, ischemic cardiomyopathy status post ICD in August of 2008 and generator change on 6/30/2014, dyslipidemia, paroxysmal atrial fibrillation, COVID infection in September 2020, and BPH who presents for follow up of coronary artery disease, congestive heart failure, management of anticoagulation, and a device interrogation.  He states that he has been taking Coumadin 5 mg daily.   He had abdominal surgery last month at [facility] to remove GI carcinoma. He continues to have abdominal pain. He states the surgery "failed" they did not remove the cancer and he did not get the chemo.  While he was in the hospital, he had an episode of vasovagal syncope while in the bathroom, went back to the ICU from 24-hour observation, everything was okay, and he was discharged. He has no further episodes of vasovagal syncope or dizziness.  He otherwise denies any chest pain, shortness of breath, palpitations, headaches, dizziness, paroxysmal nocturnal dyspnea, and orthopnea.   He is taking Coumadin 5 mg daily.  Patient presents for a follow up visit. Patient was last seen in November 2023. Patient states that he is complaint with all medications. He is due for a ECHO and Nuclear Stress test. No other complaints offered.

## 2024-09-19 NOTE — ADDENDUM
[FreeTextEntry1] : ADDENDUM created on 10/4/2023:  Echocardiogram performed on 9/22/2023 revealed mild left ventricular systolic dysfunction with an EF= 46%. Mild diastolic dysfunction. No pulmonary HTN. PASP= 24 mmHg. Mildly dilated aortic root, measuring 3.8 cm. The ascending aorta is mildly dilated, measuring 3.8 cm. No significant valvular abnormalities.   Pharmacologic Nuclear Stress Test performed on 9/22/2023: Medium-sized, moderate defects of the lateral and inferolateral walls that are fixed consistent with infarct. The left ventricular ejection fraction is decreased with an EF of 42% and hypokinesis of the inferolateral wall.   Patient has intermediate clinical risk predictors for an intermediate risk surgery (exploratory laparotomy, extensive tumor debulking, and possible bowel resection). His nuclear stress test revealed infarct without any ischemia and his echocardiogram revealed mild left ventricular systolic dysfunction. He is optimized from the cardiac standpoint and may proceed with his proposed procedure.  EP should be contacted pre and postoperatively for management of his defibrillator.  His Coumadin will be held starting on 10/2. He will continue on aspirin 81 mg daily without interruption. He should be started back on anticoagulation postoperatively.

## 2024-09-19 NOTE — HISTORY OF PRESENT ILLNESS
[FreeTextEntry1] : Patient is a 74 year old man with a history of CAD s/p drug-eluting stents to the mid LAD and ostial OM in March of 2008 and most recently status post DAPHNIE to the distal RCA on 11/9/2018, ischemic cardiomyopathy status post ICD in August of 2008 and generator change on 6/30/2014, dyslipidemia, paroxysmal atrial fibrillation, COVID infection in September 2020, and BPH who presents for follow up of coronary artery disease, congestive heart failure, management of anticoagulation, and a device interrogation.  He states that he has been taking Coumadin 5 mg daily.   He had abdominal surgery last month at [facility] to remove GI carcinoma. He continues to have abdominal pain. He states the surgery "failed" they did not remove the cancer and he did not get the chemo.  While he was in the hospital, he had an episode of vasovagal syncope while in the bathroom, went back to the ICU from 24-hour observation, everything was okay, and he was discharged. He has no further episodes of vasovagal syncope or dizziness.  He otherwise denies any chest pain, shortness of breath, palpitations, headaches, dizziness, paroxysmal nocturnal dyspnea, and orthopnea.   He is taking Coumadin 5 mg daily.  Patient presents for a follow up visit. Patient was last seen in November 2023. Patient states that he is complaint with all medications. He is due for a ECHO and Nuclear Stress test. No other complaints offered.

## 2024-09-19 NOTE — PHYSICAL EXAM
[General Appearance - Well Developed] : well developed [Normal Appearance] : normal appearance [Well Groomed] : well groomed [General Appearance - Well Nourished] : well nourished [No Deformities] : no deformities [General Appearance - In No Acute Distress] : no acute distress [Normal Conjunctiva] : the conjunctiva exhibited no abnormalities [Normal Oral Mucosa] : normal oral mucosa [Normal Jugular Venous A Waves Present] : normal jugular venous A waves present [Normal Jugular Venous V Waves Present] : normal jugular venous V waves present [No Jugular Venous Carrillo A Waves] : no jugular venous carrillo A waves [Respiration, Rhythm And Depth] : normal respiratory rhythm and effort [Exaggerated Use Of Accessory Muscles For Inspiration] : no accessory muscle use [Auscultation Breath Sounds / Voice Sounds] : lungs were clear to auscultation bilaterally [Bowel Sounds] : normal bowel sounds [Abdomen Soft] : soft [Abdomen Tenderness] : non-tender [Abnormal Walk] : normal gait [Nail Clubbing] : no clubbing of the fingernails [Cyanosis, Localized] : no localized cyanosis [Petechial Hemorrhages (___cm)] : no petechial hemorrhages [Skin Color & Pigmentation] : normal skin color and pigmentation [] : no rash [No Skin Ulcers] : no skin ulcer [Oriented To Time, Place, And Person] : oriented to person, place, and time [Affect] : the affect was normal [Mood] : the mood was normal [No Anxiety] : not feeling anxious [Normal Rate] : normal [Rhythm Regular] : regular [Normal S1] : normal S1 [Normal S2] : normal S2 [No Murmur] : no murmurs heard [No Pitting Edema] : no pitting edema present [FreeTextEntry1] : Extraocular muscles intact.  Anicteric sclerae. [S3] : no S3 [Right Carotid Bruit] : no bruit heard over the right carotid [Left Carotid Bruit] : no bruit heard over the left carotid [Bruit] : no bruit heard

## 2024-09-19 NOTE — DISCUSSION/SUMMARY
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  PLAN: 1. He is status post DAPHNIE to the distal RCA in November 2018 and completed 3 months of treatment with Plavix. He will continue on ASA 81 mg daily indefinitely. I have asked him to schedule a nuclear stress test given his CAD. I have also asked him to schedule an echocardiogram to evaluate his LV function given his cardiomyopathy.  He will need these tests to be risk stratified for his abdominal surgery. 2. He will continue on Simvastatin 20 mg daily. His ideal LDL is less than 70. His most recent LDL was in the 90s and therefore not at the optimal. He will also modify his diet given his dyslipidemia.  3. His blood pressure is good, thus he will continue on Lisinopril 5 mg daily and Coreg 25 mg twice daily. These medications are also ideal given his cardiomyopathy. He had PVCs on his ECG, but no ectopy on exam or at the time that his interrogation was performed. 4. His INR today is therapeutic at 2.8, thus he will continue on Coumadin 5 mg at bedtime. He should have a repeat INR in 4 weeks. He will continue on Coreg for rate control and suppression of ectopy given his history of paroxysmal atrial fibrillation. 5. He had a device interrogation today that revealed a normal functioning defibrillator. His OptiVol level was elevated for about a week and a half in June. He had an episode of atrial fibrillation last week that lasted for about five and a half hours. I have asked him to follow up with EP in 3 months as his battery life is trending down and he has 15 months of battery longevity.  6. He will follow up with me in 3-4 months or sooner should he experience any symptoms in the interim or should there be any ischemia on his nuclear stress test. 7. We discussed  that if his surgery is emergent that he will be considered high risk for surgery. I will see him after his cardiac studies have been performed. 8. I spent approximately 45 minutes with the patient during this encounter, including the time spent on documentation.

## 2024-09-19 NOTE — PHYSICAL EXAM
[General Appearance - Well Developed] : well developed [Normal Appearance] : normal appearance [Well Groomed] : well groomed [General Appearance - Well Nourished] : well nourished [No Deformities] : no deformities [General Appearance - In No Acute Distress] : no acute distress [Normal Conjunctiva] : the conjunctiva exhibited no abnormalities [Normal Oral Mucosa] : normal oral mucosa [Normal Jugular Venous A Waves Present] : normal jugular venous A waves present [Normal Jugular Venous V Waves Present] : normal jugular venous V waves present [No Jugular Venous Carrillo A Waves] : no jugular venous carrillo A waves [Exaggerated Use Of Accessory Muscles For Inspiration] : no accessory muscle use [Respiration, Rhythm And Depth] : normal respiratory rhythm and effort [Auscultation Breath Sounds / Voice Sounds] : lungs were clear to auscultation bilaterally [Bowel Sounds] : normal bowel sounds [Abdomen Soft] : soft [Abdomen Tenderness] : non-tender [Abnormal Walk] : normal gait [Nail Clubbing] : no clubbing of the fingernails [Cyanosis, Localized] : no localized cyanosis [Petechial Hemorrhages (___cm)] : no petechial hemorrhages [Skin Color & Pigmentation] : normal skin color and pigmentation [] : no rash [No Skin Ulcers] : no skin ulcer [Oriented To Time, Place, And Person] : oriented to person, place, and time [Affect] : the affect was normal [Mood] : the mood was normal [No Anxiety] : not feeling anxious [Normal Rate] : normal [Rhythm Regular] : regular [Normal S1] : normal S1 [Normal S2] : normal S2 [No Murmur] : no murmurs heard [No Pitting Edema] : no pitting edema present [FreeTextEntry1] : Extraocular muscles intact.  Anicteric sclerae. [S3] : no S3 [Right Carotid Bruit] : no bruit heard over the right carotid [Left Carotid Bruit] : no bruit heard over the left carotid [Bruit] : no bruit heard

## 2024-09-19 NOTE — CARDIOLOGY SUMMARY
[LVEF ___%] : LVEF [unfilled]% [___] : [unfilled] [de-identified] : Sinus Rhythm with a first degree A-V block at 83 beats per minute with frequent pvcs in a pattern of ventricular trigeminy, poor R wave progression, and low voltage QRS

## 2024-09-19 NOTE — CARDIOLOGY SUMMARY
[LVEF ___%] : LVEF [unfilled]% [___] : [unfilled] [de-identified] : Sinus Rhythm with a first degree A-V block at 83 beats per minute with frequent pvcs in a pattern of ventricular trigeminy, poor R wave progression, and low voltage QRS

## 2024-09-20 LAB
INR PPP: 2.4 RATIO
POCT-PROTHROMBIN TIME: 28.3 SECS
QUALITY CONTROL: YES

## 2024-09-20 NOTE — PROCEDURE
[ICD] : Implantable cardioverter-defibrillator [Lead Imp:  ___ohms] : lead impedance was [unfilled] ohms [Sensing Amplitude ___mv] : sensing amplitude was [unfilled] mv [___V @] : [unfilled] V [___ ms] : [unfilled] ms [de-identified] : Medtronic [de-identified] : Guille C[address]OR DR  [de-identified] : [id number] [de-identified] : 6/30/2014

## 2024-09-20 NOTE — DISCUSSION/SUMMARY
[FreeTextEntry1] : s/p primary prevention ICD 2008 for ICM.  Gen change in 2014 and now approaching BRENNON (4 months to BRENNON).  F/u in 2 months.

## 2024-09-20 NOTE — HISTORY OF PRESENT ILLNESS
[de-identified] : s/p primary prevention ICD (dual chamber Medtronic) for ischemic cardiomyopathy.  Initial implant 2008 with generator change 2014.  No history of ICD therapy.  He has no complaints: no chest pain, no shortness of breath, no palpitations, no lightheadedness/dizziness, no orthopnea, no PND.

## 2024-09-20 NOTE — PHYSICAL EXAM
[General Appearance - Well Developed] : well developed [Normal Appearance] : normal appearance [Well Groomed] : well groomed [General Appearance - Well Nourished] : well nourished [No Deformities] : no deformities [General Appearance - In No Acute Distress] : no acute distress [Heart Rate And Rhythm] : heart rate and rhythm were normal [Heart Sounds] : normal S1 and S2 [Murmurs] : no murmurs present [Respiration, Rhythm And Depth] : normal respiratory rhythm and effort [Exaggerated Use Of Accessory Muscles For Inspiration] : no accessory muscle use [Auscultation Breath Sounds / Voice Sounds] : lungs were clear to auscultation bilaterally [Left Infraclavicular] : left infraclavicular area [Clean] : clean [Dry] : dry [Well-Healed] : well-healed [Abdomen Soft] : soft [Abdomen Tenderness] : non-tender [Abdomen Mass (___ Cm)] : no abdominal mass palpated [Nail Clubbing] : no clubbing of the fingernails [Cyanosis, Localized] : no localized cyanosis [] : no ischemic changes [Petechial Hemorrhages (___cm)] : no petechial hemorrhages

## 2024-10-17 ENCOUNTER — APPOINTMENT (OUTPATIENT)
Dept: CT IMAGING | Facility: IMAGING CENTER | Age: 74
End: 2024-10-17
Payer: MEDICARE

## 2024-10-17 ENCOUNTER — APPOINTMENT (OUTPATIENT)
Dept: HEMATOLOGY ONCOLOGY | Facility: CLINIC | Age: 74
End: 2024-10-17

## 2024-10-17 ENCOUNTER — RESULT REVIEW (OUTPATIENT)
Age: 74
End: 2024-10-17

## 2024-10-17 ENCOUNTER — OUTPATIENT (OUTPATIENT)
Dept: OUTPATIENT SERVICES | Facility: HOSPITAL | Age: 74
LOS: 1 days | End: 2024-10-17
Payer: MEDICARE

## 2024-10-17 DIAGNOSIS — Z00.8 ENCOUNTER FOR OTHER GENERAL EXAMINATION: ICD-10-CM

## 2024-10-17 DIAGNOSIS — C80.1 MALIGNANT (PRIMARY) NEOPLASM, UNSPECIFIED: ICD-10-CM

## 2024-10-17 LAB
BASOPHILS # BLD AUTO: 0.02 K/UL — SIGNIFICANT CHANGE UP (ref 0–0.2)
BASOPHILS NFR BLD AUTO: 0.4 % — SIGNIFICANT CHANGE UP (ref 0–2)
EOSINOPHIL # BLD AUTO: 0.09 K/UL — SIGNIFICANT CHANGE UP (ref 0–0.5)
EOSINOPHIL NFR BLD AUTO: 2 % — SIGNIFICANT CHANGE UP (ref 0–6)
HCT VFR BLD CALC: 45.5 % — SIGNIFICANT CHANGE UP (ref 39–50)
HGB BLD-MCNC: 14.7 G/DL — SIGNIFICANT CHANGE UP (ref 13–17)
IMM GRANULOCYTES NFR BLD AUTO: 0.4 % — SIGNIFICANT CHANGE UP (ref 0–0.9)
LYMPHOCYTES # BLD AUTO: 0.73 K/UL — LOW (ref 1–3.3)
LYMPHOCYTES # BLD AUTO: 15.8 % — SIGNIFICANT CHANGE UP (ref 13–44)
MCHC RBC-ENTMCNC: 29 PG — SIGNIFICANT CHANGE UP (ref 27–34)
MCHC RBC-ENTMCNC: 32.3 G/DL — SIGNIFICANT CHANGE UP (ref 32–36)
MCV RBC AUTO: 89.7 FL — SIGNIFICANT CHANGE UP (ref 80–100)
MONOCYTES # BLD AUTO: 0.38 K/UL — SIGNIFICANT CHANGE UP (ref 0–0.9)
MONOCYTES NFR BLD AUTO: 8.2 % — SIGNIFICANT CHANGE UP (ref 2–14)
NEUTROPHILS # BLD AUTO: 3.37 K/UL — SIGNIFICANT CHANGE UP (ref 1.8–7.4)
NEUTROPHILS NFR BLD AUTO: 73.2 % — SIGNIFICANT CHANGE UP (ref 43–77)
NRBC # BLD: 0 /100 WBCS — SIGNIFICANT CHANGE UP (ref 0–0)
NRBC BLD-RTO: 0 /100 WBCS — SIGNIFICANT CHANGE UP (ref 0–0)
PLATELET # BLD AUTO: 218 K/UL — SIGNIFICANT CHANGE UP (ref 150–400)
RBC # BLD: 5.07 M/UL — SIGNIFICANT CHANGE UP (ref 4.2–5.8)
RBC # FLD: 14.3 % — SIGNIFICANT CHANGE UP (ref 10.3–14.5)
WBC # BLD: 4.61 K/UL — SIGNIFICANT CHANGE UP (ref 3.8–10.5)
WBC # FLD AUTO: 4.61 K/UL — SIGNIFICANT CHANGE UP (ref 3.8–10.5)

## 2024-10-17 PROCEDURE — 71260 CT THORAX DX C+: CPT | Mod: 26

## 2024-10-17 PROCEDURE — 71260 CT THORAX DX C+: CPT

## 2024-10-17 PROCEDURE — 74177 CT ABD & PELVIS W/CONTRAST: CPT | Mod: 26

## 2024-10-17 PROCEDURE — 74177 CT ABD & PELVIS W/CONTRAST: CPT

## 2024-10-22 LAB
ALBUMIN SERPL ELPH-MCNC: 4.1 G/DL
ALP BLD-CCNC: 72 U/L
ALT SERPL-CCNC: 14 U/L
ANION GAP SERPL CALC-SCNC: 14 MMOL/L
AST SERPL-CCNC: 20 U/L
BILIRUB SERPL-MCNC: 1 MG/DL
BUN SERPL-MCNC: 17 MG/DL
CALCIUM SERPL-MCNC: 9.7 MG/DL
CEA SERPL-MCNC: 21.9 NG/ML
CHLORIDE SERPL-SCNC: 99 MMOL/L
CO2 SERPL-SCNC: 25 MMOL/L
CREAT SERPL-MCNC: 0.98 MG/DL
EGFR: 80 ML/MIN/1.73M2
GLUCOSE SERPL-MCNC: 105 MG/DL
HBV CORE IGG+IGM SER QL: NONREACTIVE
HBV CORE IGM SER QL: NONREACTIVE
HBV E AB SER QL: NONREACTIVE
HBV E AG SER QL: NONREACTIVE
HBV SURFACE AB SER QL: NONREACTIVE
HBV SURFACE AG SER QL: NONREACTIVE
HCV AB SER QL: NONREACTIVE
HCV S/CO RATIO: 0.14 S/CO
POTASSIUM SERPL-SCNC: 5.4 MMOL/L
PROT SERPL-MCNC: 8.4 G/DL
SODIUM SERPL-SCNC: 138 MMOL/L

## 2024-11-04 NOTE — PRE-ANESTHESIA EVALUATION ADULT - NSANTHAGERD_ENT_A_CORE
What Type Of Note Output Would You Prefer (Optional)?: Bullet Format How Severe Is Your Skin Lesion?: moderate treated_been_treated Is This A New Presentation, Or A Follow-Up?: Skin Lesions When Was It Treated?: 2022 Additional History: Pt states that she tried dandruff shampoo, but it didn’t alleviate her sx. She reports that lesion on nose enlarges in size over and over again. Pt states that using Ketoconazole from husbands rx is not helping. Yes

## 2024-11-05 ENCOUNTER — TRANSCRIPTION ENCOUNTER (OUTPATIENT)
Age: 74
End: 2024-11-05

## 2024-12-05 ENCOUNTER — RX RENEWAL (OUTPATIENT)
Age: 74
End: 2024-12-05

## 2025-01-10 ENCOUNTER — RX RENEWAL (OUTPATIENT)
Age: 75
End: 2025-01-10

## 2025-01-10 ENCOUNTER — NON-APPOINTMENT (OUTPATIENT)
Age: 75
End: 2025-01-10

## 2025-01-10 ENCOUNTER — TRANSCRIPTION ENCOUNTER (OUTPATIENT)
Age: 75
End: 2025-01-10

## 2025-01-11 ENCOUNTER — NON-APPOINTMENT (OUTPATIENT)
Age: 75
End: 2025-01-11

## 2025-01-16 ENCOUNTER — APPOINTMENT (OUTPATIENT)
Dept: ELECTROPHYSIOLOGY | Facility: CLINIC | Age: 75
End: 2025-01-16
Payer: MEDICARE

## 2025-01-16 VITALS
HEIGHT: 63 IN | SYSTOLIC BLOOD PRESSURE: 130 MMHG | WEIGHT: 155 LBS | BODY MASS INDEX: 27.46 KG/M2 | TEMPERATURE: 97.3 F | HEART RATE: 87 BPM | DIASTOLIC BLOOD PRESSURE: 85 MMHG | RESPIRATION RATE: 12 BRPM | OXYGEN SATURATION: 99 %

## 2025-01-16 DIAGNOSIS — I48.0 PAROXYSMAL ATRIAL FIBRILLATION: ICD-10-CM

## 2025-01-16 PROCEDURE — 99213 OFFICE O/P EST LOW 20 MIN: CPT

## 2025-01-16 PROCEDURE — 93283 PRGRMG EVAL IMPLANTABLE DFB: CPT

## 2025-03-10 ENCOUNTER — TRANSCRIPTION ENCOUNTER (OUTPATIENT)
Age: 75
End: 2025-03-10

## 2025-03-26 ENCOUNTER — RX RENEWAL (OUTPATIENT)
Age: 75
End: 2025-03-26

## 2025-03-26 ENCOUNTER — TRANSCRIPTION ENCOUNTER (OUTPATIENT)
Age: 75
End: 2025-03-26

## 2025-03-29 ENCOUNTER — NON-APPOINTMENT (OUTPATIENT)
Age: 75
End: 2025-03-29

## 2025-03-31 ENCOUNTER — APPOINTMENT (OUTPATIENT)
Dept: CARDIOLOGY | Facility: CLINIC | Age: 75
End: 2025-03-31
Payer: MEDICARE

## 2025-03-31 ENCOUNTER — NON-APPOINTMENT (OUTPATIENT)
Age: 75
End: 2025-03-31

## 2025-03-31 VITALS
OXYGEN SATURATION: 99 % | WEIGHT: 155 LBS | BODY MASS INDEX: 27.46 KG/M2 | SYSTOLIC BLOOD PRESSURE: 112 MMHG | HEART RATE: 78 BPM | HEIGHT: 63 IN | DIASTOLIC BLOOD PRESSURE: 80 MMHG | RESPIRATION RATE: 12 BRPM

## 2025-03-31 DIAGNOSIS — I48.0 PAROXYSMAL ATRIAL FIBRILLATION: ICD-10-CM

## 2025-03-31 DIAGNOSIS — I50.9 HEART FAILURE, UNSPECIFIED: ICD-10-CM

## 2025-03-31 LAB — INR PPP: 2.1 RATIO

## 2025-03-31 PROCEDURE — 93000 ELECTROCARDIOGRAM COMPLETE: CPT

## 2025-03-31 PROCEDURE — 99214 OFFICE O/P EST MOD 30 MIN: CPT | Mod: 25

## 2025-03-31 PROCEDURE — 85610 PROTHROMBIN TIME: CPT | Mod: QW

## 2025-04-03 ENCOUNTER — TRANSCRIPTION ENCOUNTER (OUTPATIENT)
Age: 75
End: 2025-04-03

## 2025-04-04 ENCOUNTER — NON-APPOINTMENT (OUTPATIENT)
Age: 75
End: 2025-04-04

## 2025-04-09 ENCOUNTER — TRANSCRIPTION ENCOUNTER (OUTPATIENT)
Age: 75
End: 2025-04-09

## 2025-04-09 DIAGNOSIS — Z01.818 ENCOUNTER FOR OTHER PREPROCEDURAL EXAMINATION: ICD-10-CM

## 2025-04-10 ENCOUNTER — TRANSCRIPTION ENCOUNTER (OUTPATIENT)
Age: 75
End: 2025-04-10

## 2025-04-10 ENCOUNTER — OUTPATIENT (OUTPATIENT)
Dept: OUTPATIENT SERVICES | Facility: HOSPITAL | Age: 75
LOS: 1 days | End: 2025-04-10
Payer: MEDICARE

## 2025-04-10 VITALS
TEMPERATURE: 98 F | HEART RATE: 80 BPM | SYSTOLIC BLOOD PRESSURE: 116 MMHG | HEIGHT: 65 IN | OXYGEN SATURATION: 99 % | DIASTOLIC BLOOD PRESSURE: 69 MMHG | WEIGHT: 154.98 LBS | RESPIRATION RATE: 16 BRPM

## 2025-04-10 VITALS
RESPIRATION RATE: 16 BRPM | SYSTOLIC BLOOD PRESSURE: 108 MMHG | OXYGEN SATURATION: 97 % | HEART RATE: 95 BPM | DIASTOLIC BLOOD PRESSURE: 69 MMHG

## 2025-04-10 DIAGNOSIS — S82.90XA UNSPECIFIED FRACTURE OF UNSPECIFIED LOWER LEG, INITIAL ENCOUNTER FOR CLOSED FRACTURE: Chronic | ICD-10-CM

## 2025-04-10 DIAGNOSIS — Z90.49 ACQUIRED ABSENCE OF OTHER SPECIFIED PARTS OF DIGESTIVE TRACT: Chronic | ICD-10-CM

## 2025-04-10 DIAGNOSIS — Z98.890 OTHER SPECIFIED POSTPROCEDURAL STATES: Chronic | ICD-10-CM

## 2025-04-10 DIAGNOSIS — Z95.5 PRESENCE OF CORONARY ANGIOPLASTY IMPLANT AND GRAFT: Chronic | ICD-10-CM

## 2025-04-10 DIAGNOSIS — Z45.010 ENCOUNTER FOR CHECKING AND TESTING OF CARDIAC PACEMAKER PULSE GENERATOR [BATTERY]: ICD-10-CM

## 2025-04-10 DIAGNOSIS — Z95.810 PRESENCE OF AUTOMATIC (IMPLANTABLE) CARDIAC DEFIBRILLATOR: Chronic | ICD-10-CM

## 2025-04-10 LAB
ALBUMIN SERPL ELPH-MCNC: 4 G/DL
ALP BLD-CCNC: 69 U/L
ALT SERPL-CCNC: 14 U/L
ANION GAP SERPL CALC-SCNC: 10 MMOL/L
AST SERPL-CCNC: 22 U/L
BILIRUB SERPL-MCNC: 0.9 MG/DL
BUN SERPL-MCNC: 16 MG/DL
CALCIUM SERPL-MCNC: 8.7 MG/DL
CHLORIDE SERPL-SCNC: 102 MMOL/L
CO2 SERPL-SCNC: 26 MMOL/L
CREAT SERPL-MCNC: 0.93 MG/DL
EGFRCR SERPLBLD CKD-EPI 2021: 86 ML/MIN/1.73M2
GLUCOSE SERPL-MCNC: 110 MG/DL
HCT VFR BLD CALC: 41.2 %
HGB BLD-MCNC: 13.2 G/DL
INR PPP: 1.74 RATIO
MCHC RBC-ENTMCNC: 28.9 PG
MCHC RBC-ENTMCNC: 32 G/DL
MCV RBC AUTO: 90.2 FL
PLATELET # BLD AUTO: 267 K/UL
POTASSIUM SERPL-SCNC: 4.8 MMOL/L
PROT SERPL-MCNC: 8 G/DL
PT BLD: 20.4 SEC
RBC # BLD: 4.57 M/UL
RBC # FLD: 14.4 %
SODIUM SERPL-SCNC: 138 MMOL/L
WBC # FLD AUTO: 4.88 K/UL

## 2025-04-10 PROCEDURE — 33263 RMVL & RPLCMT DFB GEN 2 LEAD: CPT

## 2025-04-10 PROCEDURE — 93010 ELECTROCARDIOGRAM REPORT: CPT

## 2025-04-10 PROCEDURE — C1721: CPT

## 2025-04-10 PROCEDURE — 93005 ELECTROCARDIOGRAM TRACING: CPT

## 2025-04-10 PROCEDURE — C1889: CPT

## 2025-04-10 RX ORDER — OXYCODONE HYDROCHLORIDE 30 MG/1
5 TABLET ORAL EVERY 8 HOURS
Refills: 0 | Status: DISCONTINUED | OUTPATIENT
Start: 2025-04-10 | End: 2025-04-10

## 2025-04-10 RX ORDER — ACETAMINOPHEN 500 MG/5ML
2 LIQUID (ML) ORAL
Qty: 0 | Refills: 0 | DISCHARGE

## 2025-04-10 RX ORDER — CEFAZOLIN SODIUM IN 0.9 % NACL 3 G/100 ML
2000 INTRAVENOUS SOLUTION, PIGGYBACK (ML) INTRAVENOUS ONCE
Refills: 0 | Status: ACTIVE | OUTPATIENT
Start: 2025-04-10

## 2025-04-10 RX ORDER — OXYCODONE HYDROCHLORIDE 30 MG/1
1 TABLET ORAL
Qty: 6 | Refills: 0
Start: 2025-04-10 | End: 2025-04-11

## 2025-04-10 RX ORDER — CEPHALEXIN 250 MG/1
250 CAPSULE ORAL EVERY 8 HOURS
Refills: 0 | Status: ACTIVE | OUTPATIENT
Start: 2025-04-10 | End: 2025-04-13

## 2025-04-10 RX ORDER — CEPHALEXIN 250 MG/1
1 CAPSULE ORAL
Qty: 0 | Refills: 0
Start: 2025-04-10

## 2025-04-10 RX ORDER — CEPHALEXIN 250 MG/1
1 CAPSULE ORAL
Qty: 9 | Refills: 0
Start: 2025-04-10 | End: 2025-04-12

## 2025-04-10 RX ADMIN — CEPHALEXIN 250 MILLIGRAM(S): 250 CAPSULE ORAL at 11:35

## 2025-04-10 NOTE — PATIENT PROFILE ADULT - FALL HARM RISK - ATTEMPT OOB
Chief Complaint   Patient presents with    Hypertension       Patient is here for follow up for hypertension    Hypertension:  Patient is here for follow up chronic hypertension. This is  generally controlled on current medication regimen. BP today is 140/78--home readings have been 160-180/80's. Takes meds as directed and tolerates them well. Most recent labs reviewed with patient, including CMP, CBC, and TSH, and are not remarkable. No symptoms from htn standpoint per ROS. Patientis  compliant with lifestyle modifications. Patient does not smoke. Patient's past medical, surgical, social and/or family history reviewed, updated inchart, and are non-contributory (unless otherwise stated). Medications and allergies also reviewed and updated in chart. Current Outpatient Medications   Medication Sig Dispense Refill    metoprolol succinate (TOPROL XL) 100 MG extended release tablet Take 1 tablet by mouth daily 90 tablet 1    amLODIPine (NORVASC) 5 MG tablet Take 1 tablet by mouth daily 30 tablet 3    atorvastatin (LIPITOR) 40 MG tablet Take 1 tablet by mouth nightly 90 tablet 1    diazePAM (VALIUM) 5 MG tablet Take 1 tablet by mouth nightly as needed for Sleep. 60 tablet 3    busPIRone (BUSPAR) 10 MG tablet Take 1 tablet by mouth 2 times daily as needed (anxiety) 60 tablet 3    cetirizine (ZYRTEC) 10 MG tablet Take 1 tablet by mouth in the morning and 1 tablet in the evening.       clobetasol (TEMOVATE) 0.05 % cream APPLY CREAM TOPICALLY TWICE DAILY AS NEEDED FOR FLARES TO BODY      acetaminophen (TYLENOL) 325 MG tablet Take 2 tablets by mouth every 6 hours as needed for Pain      b complex vitamins capsule Take 1 capsule by mouth daily      melatonin 3 MG TABS tablet Take 1 tablet by mouth nightly      lamoTRIgine (LAMICTAL) 100 MG tablet Take 1 tablet by mouth 3 times daily 90 tablet 0    primidone (MYSOLINE) 250 MG tablet Take 1 tablet by mouth 3 times daily      gabapentin (NEURONTIN) 100 MG capsule
No

## 2025-04-10 NOTE — ASU DISCHARGE PLAN (ADULT/PEDIATRIC) - CALL YOUR DOCTOR IF YOU HAVE ANY OF THE FOLLOWING:
report fever > 100.4/Bleeding that does not stop/Swelling that gets worse/Pain not relieved by Medications/Fever greater than (need to indicate Fahrenheit or Celsius)/Wound/Surgical Site with redness, or foul smelling discharge or pus/Numbness, tingling, color or temperature change to extremity/Inability to tolerate liquids or foods/Increased irritability or sluggishness

## 2025-04-10 NOTE — CHART NOTE - NSCHARTNOTEFT_GEN_A_CORE
4/10/25 s/p ICD generator change DDD  Medtronic  LACW w prineo/telfa - clean and dry. No hematoma or bleeding   Per Dr Nina   EKG post  Keflex 250mg po TID x 3 days (first dose prior to discharge. Oxycodone 5mg TID prn x 2 days sent  [facility]. ISTOP [id number]. Tylenol OTC for mild pain prn  Resume coumadin 5mg po this evening   can remove dressing in 2 days   Follow up - call for apt for wound check (pt instructed)  can DC at 11am if stable.

## 2025-04-10 NOTE — PATIENT PROFILE ADULT - FUNCTIONAL ASSESSMENT - BASIC MOBILITY 6.
4-calculated by average/Not able to assess (calculate score using [facility] averaging method)

## 2025-04-10 NOTE — ASU DISCHARGE PLAN (ADULT/PEDIATRIC) - COMMENTS
PLEASE CALL DR DEE'S OFFICE TOMORROW  FOR FOLLOW UP WOUND CHECK APT PLEASE CALL DR DEE'S OFFICE TOMORROW TO SCHEDULE FOLLOW UP WOUND CHECK APPOINTMENT IN 2 WEEKS

## 2025-04-10 NOTE — H&P CARDIOLOGY - HISTORY OF PRESENT ILLNESS
Thisi is a 75 y/o male with known implantable device ICD with PMHX of HTN, HLD, CAD s/p PCI/DAPHNIE mLAD and OM 3/2008, HFrEF (35%) s/p Medtronic ICD Model #[id number] 6/2014, AF on coumadin (last dose 4/7/25 ), Appendicitis s/p appendectomy 9/2018 at [facility], BPH followed by Dr. Gaitan, Cardiologist   Pt denies cp, sob or palpitations and presents for cardiac cath for further evaluation.  Presents today for ICD Generator change with Dr Nina. Currently no CP no SOB no Palpitations.   < from: TTE W or WO Ultrasound Enhancing Agent (10.08.23 @ 14:07) >  ONCLUSIONS:      1. Technically difficult image quality.   2. Left ventricular cavity is normally sized. Left ventricular wall thickness is normal. Left ventricular systolic function is mildly decreased with an ejection fraction visually estimated at 45 to 50 %. Global left ventricular hypokinesis.   3. The right ventricle is not well visualized.   4. Device lead is visualized in the right heart.   5. Trace mitral regurgitation.      < end of copied text >  < from: Nuclear Stress Test-Exercise.. (09.22.23 @ 07:50) >  Conclusions:   1. Qualitative Perfusion:      - medium-sized, moderate defect(s) in the lateral and inferolateral walls that are fixed consistent with an infarct.   2. The left ventricle is moderately decreased in function and normal in size. The left ventricular EF% during stress is 42 %.   3. Hypokinesis of the inferolateral wall.      < end of copied text >  < from: Cardiac Cath Lab - Adult (11.09.18 @ 10:35) >  VENTRICLES: No left ventriculogram was performed.  CORONARY VESSELS: The coronary circulation is right dominant.  LM:   --  LM: Normal.  LAD:   --  Proximal LAD: There was a diffuse 30 % stenosis at the site of a  prior stent, in-stent.  --  Mid LAD: There was a discrete 40 % stenosis.  --  D1: The vessel was very small sized. Angiography showed severe  atherosclerosis.  CX:   --  Mid circumflex: There was a tubular 20 % stenosis at the site of  a prior stent, in-stent.  --  OM1: The vessel was medium sized. Angiography showed mild  atherosclerosis with no flow limiting lesions.  RCA:   --  Mid RCA: Angiography showed minor luminal irregularities with no  flow limiting lesions.  --  Distal RCA: There was a tubular 80 % stenosis in the distal third of  the vessel segment. In a second lesion, there was a discrete 40 % stenosis  in the proximal third of the vessel segment.    < end of copied text >

## 2025-04-10 NOTE — ASU DISCHARGE PLAN (ADULT/PEDIATRIC) - NS MD DC FALL RISK RISK
For information on Fall & Injury Prevention, visit: [email or web address] OR  [email or web address] OR  https://www.cdc.gov/steadi/patient.html

## 2025-04-10 NOTE — PRE-ANESTHESIA EVALUATION ADULT - NSATTENDATTESTRD_GEN_ALL_CORE
----- Message from Jazzmine Morocho MD sent at 3/9/2018  2:25 PM CST -----  Ferritin much improved. Iron is now somewhat elevated, but due to iron supplementation. She can back off on the iron to 4 days a week, recheck CBC, Ferritin, iron/tibc in 6 months. MAT  
Left detailed message for patient advising of lab results and Dr. Jazzmine Morocho's recommendations.   
The patient has been re-examined and I agree with the above assessment or I updated with my findings.

## 2025-04-10 NOTE — PRE-ANESTHESIA EVALUATION ADULT - NSANTHPMHFT_GEN_ALL_CORE
faith is a 75 y/o male with known implantable device ICD with PMHX of HTN, HLD, CAD s/p PCI/DAPHNIE mLAD and OM 3/2008, HFrEF (35%) s/p Medtronic ICD Model #[id number] 6/2014, AF on coumadin (last dose 4/7/25 ), Appendicitis s/p appendectomy 9/2018 at [facility], BPH followed by Dr. Gaitan, Cardiologist   Pt denies cp, sob or palpitations and presents for cardiac cath for further evaluation.  Presents today for ICD Generator change with Dr Nina. Currently no CP no SOB no Palpitations.

## 2025-04-10 NOTE — ASU DISCHARGE PLAN (ADULT/PEDIATRIC) - FINANCIAL ASSISTANCE
[facility] provides services at a reduced cost to those who are determined to be eligible through [facility]’s financial assistance program. Information regarding [facility]’s financial assistance program can be found by going to [email or web address] or by calling [phone].

## 2025-04-11 ENCOUNTER — NON-APPOINTMENT (OUTPATIENT)
Age: 75
End: 2025-04-11

## 2025-04-11 ENCOUNTER — RX RENEWAL (OUTPATIENT)
Age: 75
End: 2025-04-11

## 2025-04-24 ENCOUNTER — APPOINTMENT (OUTPATIENT)
Dept: ELECTROPHYSIOLOGY | Facility: CLINIC | Age: 75
End: 2025-04-24

## 2025-05-01 ENCOUNTER — NON-APPOINTMENT (OUTPATIENT)
Age: 75
End: 2025-05-01

## 2025-05-01 ENCOUNTER — APPOINTMENT (OUTPATIENT)
Dept: ELECTROPHYSIOLOGY | Facility: CLINIC | Age: 75
End: 2025-05-01
Payer: MEDICARE

## 2025-05-01 VITALS
DIASTOLIC BLOOD PRESSURE: 85 MMHG | WEIGHT: 155 LBS | SYSTOLIC BLOOD PRESSURE: 142 MMHG | BODY MASS INDEX: 27.46 KG/M2 | HEART RATE: 70 BPM | HEIGHT: 63 IN | OXYGEN SATURATION: 98 %

## 2025-05-01 DIAGNOSIS — I25.5 ISCHEMIC CARDIOMYOPATHY: ICD-10-CM

## 2025-05-01 PROCEDURE — 99024 POSTOP FOLLOW-UP VISIT: CPT

## 2025-05-01 PROCEDURE — 93000 ELECTROCARDIOGRAM COMPLETE: CPT | Mod: XU

## 2025-05-01 PROCEDURE — 93283 PRGRMG EVAL IMPLANTABLE DFB: CPT

## 2025-05-21 ENCOUNTER — RX RENEWAL (OUTPATIENT)
Age: 75
End: 2025-05-21

## 2025-06-03 ENCOUNTER — TRANSCRIPTION ENCOUNTER (OUTPATIENT)
Age: 75
End: 2025-06-03

## 2025-06-04 ENCOUNTER — TRANSCRIPTION ENCOUNTER (OUTPATIENT)
Age: 75
End: 2025-06-04

## 2025-06-06 ENCOUNTER — TRANSCRIPTION ENCOUNTER (OUTPATIENT)
Age: 75
End: 2025-06-06

## 2025-06-18 ENCOUNTER — TRANSCRIPTION ENCOUNTER (OUTPATIENT)
Age: 75
End: 2025-06-18

## 2025-06-19 ENCOUNTER — RX RENEWAL (OUTPATIENT)
Age: 75
End: 2025-06-19

## 2025-06-20 ENCOUNTER — RX RENEWAL (OUTPATIENT)
Age: 75
End: 2025-06-20

## 2025-07-07 ENCOUNTER — TRANSCRIPTION ENCOUNTER (OUTPATIENT)
Age: 75
End: 2025-07-07

## 2025-07-09 ENCOUNTER — TRANSCRIPTION ENCOUNTER (OUTPATIENT)
Age: 75
End: 2025-07-09

## 2025-07-28 ENCOUNTER — EMERGENCY (EMERGENCY)
Facility: HOSPITAL | Age: 75
LOS: 1 days | End: 2025-07-28
Attending: EMERGENCY MEDICINE | Admitting: EMERGENCY MEDICINE
Payer: MEDICARE

## 2025-07-28 VITALS
HEIGHT: 65 IN | HEART RATE: 78 BPM | RESPIRATION RATE: 18 BRPM | DIASTOLIC BLOOD PRESSURE: 80 MMHG | SYSTOLIC BLOOD PRESSURE: 125 MMHG | OXYGEN SATURATION: 98 % | TEMPERATURE: 98 F | WEIGHT: 154.98 LBS

## 2025-07-28 VITALS
DIASTOLIC BLOOD PRESSURE: 71 MMHG | RESPIRATION RATE: 15 BRPM | HEART RATE: 102 BPM | OXYGEN SATURATION: 100 % | SYSTOLIC BLOOD PRESSURE: 105 MMHG | TEMPERATURE: 98 F

## 2025-07-28 DIAGNOSIS — Z90.49 ACQUIRED ABSENCE OF OTHER SPECIFIED PARTS OF DIGESTIVE TRACT: Chronic | ICD-10-CM

## 2025-07-28 DIAGNOSIS — Z95.5 PRESENCE OF CORONARY ANGIOPLASTY IMPLANT AND GRAFT: Chronic | ICD-10-CM

## 2025-07-28 DIAGNOSIS — Z98.890 OTHER SPECIFIED POSTPROCEDURAL STATES: Chronic | ICD-10-CM

## 2025-07-28 DIAGNOSIS — Z95.810 PRESENCE OF AUTOMATIC (IMPLANTABLE) CARDIAC DEFIBRILLATOR: Chronic | ICD-10-CM

## 2025-07-28 DIAGNOSIS — S82.90XA UNSPECIFIED FRACTURE OF UNSPECIFIED LOWER LEG, INITIAL ENCOUNTER FOR CLOSED FRACTURE: Chronic | ICD-10-CM

## 2025-07-28 LAB
ALBUMIN SERPL ELPH-MCNC: 3.6 G/DL — SIGNIFICANT CHANGE UP (ref 3.3–5)
ALP SERPL-CCNC: 63 U/L — SIGNIFICANT CHANGE UP (ref 40–120)
ALT FLD-CCNC: 30 U/L — SIGNIFICANT CHANGE UP (ref 4–41)
ANION GAP SERPL CALC-SCNC: 10 MMOL/L — SIGNIFICANT CHANGE UP (ref 7–14)
APTT BLD: 48.5 SEC — HIGH (ref 26.1–36.8)
APTT BLD: 55.7 SEC — HIGH (ref 26.1–36.8)
AST SERPL-CCNC: 38 U/L — SIGNIFICANT CHANGE UP (ref 4–40)
BASOPHILS # BLD AUTO: 0.02 K/UL — SIGNIFICANT CHANGE UP (ref 0–0.2)
BASOPHILS NFR BLD AUTO: 0.5 % — SIGNIFICANT CHANGE UP (ref 0–2)
BILIRUB SERPL-MCNC: 0.7 MG/DL — SIGNIFICANT CHANGE UP (ref 0.2–1.2)
BUN SERPL-MCNC: 7 MG/DL — SIGNIFICANT CHANGE UP (ref 7–23)
CALCIUM SERPL-MCNC: 8.8 MG/DL — SIGNIFICANT CHANGE UP (ref 8.4–10.5)
CHLORIDE SERPL-SCNC: 102 MMOL/L — SIGNIFICANT CHANGE UP (ref 98–107)
CO2 SERPL-SCNC: 26 MMOL/L — SIGNIFICANT CHANGE UP (ref 22–31)
CREAT SERPL-MCNC: 0.93 MG/DL — SIGNIFICANT CHANGE UP (ref 0.5–1.3)
EGFR: 86 ML/MIN/1.73M2 — SIGNIFICANT CHANGE UP
EGFR: 86 ML/MIN/1.73M2 — SIGNIFICANT CHANGE UP
EOSINOPHIL # BLD AUTO: 0.13 K/UL — SIGNIFICANT CHANGE UP (ref 0–0.5)
EOSINOPHIL NFR BLD AUTO: 3.5 % — SIGNIFICANT CHANGE UP (ref 0–6)
GLUCOSE SERPL-MCNC: 98 MG/DL — SIGNIFICANT CHANGE UP (ref 70–99)
HCT VFR BLD CALC: 40.2 % — SIGNIFICANT CHANGE UP (ref 39–50)
HGB BLD-MCNC: 12.9 G/DL — LOW (ref 13–17)
IMM GRANULOCYTES # BLD AUTO: 0.01 K/UL — SIGNIFICANT CHANGE UP (ref 0–0.07)
IMM GRANULOCYTES NFR BLD AUTO: 0.3 % — SIGNIFICANT CHANGE UP (ref 0–0.9)
INR BLD: 6.89 RATIO — CRITICAL HIGH (ref 0.85–1.16)
INR BLD: 7.15 RATIO — CRITICAL HIGH (ref 0.85–1.16)
LACTATE SERPL-SCNC: 1.1 MMOL/L — SIGNIFICANT CHANGE UP (ref 0.5–2)
LIDOCAIN IGE QN: 24 U/L — SIGNIFICANT CHANGE UP (ref 7–60)
LYMPHOCYTES # BLD AUTO: 1.01 K/UL — SIGNIFICANT CHANGE UP (ref 1–3.3)
LYMPHOCYTES NFR BLD AUTO: 27.2 % — SIGNIFICANT CHANGE UP (ref 13–44)
MCHC RBC-ENTMCNC: 28.1 PG — SIGNIFICANT CHANGE UP (ref 27–34)
MCHC RBC-ENTMCNC: 32.1 G/DL — SIGNIFICANT CHANGE UP (ref 32–36)
MCV RBC AUTO: 87.6 FL — SIGNIFICANT CHANGE UP (ref 80–100)
MONOCYTES # BLD AUTO: 0.38 K/UL — SIGNIFICANT CHANGE UP (ref 0–0.9)
MONOCYTES NFR BLD AUTO: 10.2 % — SIGNIFICANT CHANGE UP (ref 2–14)
NEUTROPHILS # BLD AUTO: 2.16 K/UL — SIGNIFICANT CHANGE UP (ref 1.8–7.4)
NEUTROPHILS NFR BLD AUTO: 58.3 % — SIGNIFICANT CHANGE UP (ref 43–77)
NRBC # BLD AUTO: 0 K/UL — SIGNIFICANT CHANGE UP (ref 0–0)
NRBC # FLD: 0 K/UL — SIGNIFICANT CHANGE UP (ref 0–0)
NRBC BLD AUTO-RTO: 0 /100 WBCS — SIGNIFICANT CHANGE UP (ref 0–0)
PLATELET # BLD AUTO: 279 K/UL — SIGNIFICANT CHANGE UP (ref 150–400)
PMV BLD: 10.5 FL — SIGNIFICANT CHANGE UP (ref 7–13)
POTASSIUM SERPL-MCNC: 4 MMOL/L — SIGNIFICANT CHANGE UP (ref 3.5–5.3)
POTASSIUM SERPL-SCNC: 4 MMOL/L — SIGNIFICANT CHANGE UP (ref 3.5–5.3)
PROT SERPL-MCNC: 7.6 G/DL — SIGNIFICANT CHANGE UP (ref 6–8.3)
PROTHROM AB SERPL-ACNC: 80.4 SEC — HIGH (ref 9.9–13.4)
PROTHROM AB SERPL-ACNC: 83.5 SEC — HIGH (ref 9.9–13.4)
RBC # BLD: 4.59 M/UL — SIGNIFICANT CHANGE UP (ref 4.2–5.8)
RBC # FLD: 14.6 % — HIGH (ref 10.3–14.5)
SODIUM SERPL-SCNC: 138 MMOL/L — SIGNIFICANT CHANGE UP (ref 135–145)
WBC # BLD: 3.71 K/UL — LOW (ref 3.8–10.5)
WBC # FLD AUTO: 3.71 K/UL — LOW (ref 3.8–10.5)

## 2025-07-28 PROCEDURE — 99285 EMERGENCY DEPT VISIT HI MDM: CPT

## 2025-07-28 PROCEDURE — 74177 CT ABD & PELVIS W/CONTRAST: CPT | Mod: 26

## 2025-07-28 RX ORDER — ONDANSETRON HCL/PF 4 MG/2 ML
4 VIAL (ML) INJECTION ONCE
Refills: 0 | Status: COMPLETED | OUTPATIENT
Start: 2025-07-28 | End: 2025-07-28

## 2025-07-28 RX ADMIN — Medication 1000 MILLILITER(S): at 11:12

## 2025-07-28 NOTE — ED ADULT NURSE NOTE - OBJECTIVE STATEMENT
Pt arrives ambulatory on room air c/o abdominal pain and diarrhea since 7/13. Pt denies any blood in stool. Pt denies recent abx use. Pt respirations even and unlabored, chest rise and fall equal b/l. Pt denies chest pain, HA, SOB, dizziness, fever/chills. 20g placed to RAC, labs collected and sent. Pt pending CT. Stretcher in lowest position, call bell within reach, pt safety maintained.

## 2025-07-28 NOTE — ED ADULT TRIAGE NOTE - CHIEF COMPLAINT QUOTE
pt with hx A fib on Coumadin,, c/o of diffuse abd discomfort and diarrhea for one week, pt denies any bloody stools

## 2025-07-28 NOTE — ED PROVIDER NOTE - CLINICAL SUMMARY MEDICAL DECISION MAKING FREE TEXT BOX
Mr. Quiroga has peritoneal carcinomatosisAnd is 74 years old, history of A-fib, now with watery diarrhea recent trip to [place].  Differential diagnose includes traveler's diarrhea, colitis, lactic acid ordered consider mesenteric ischemia though I doubt this, he has no pain out of proportion.  He is not currently in A-fib, he has a regular pulse.  Will also check renal function, and provide IV rehydration and reassess.  I did review the oncology note, from 11/2023, and reviwed the charrt and nursing notes Mr. Quiroga has peritoneal carcinomatosisAnd is 74 years old, history of A-fib, now with watery diarrhea recent trip to [place].  Differential diagnose includes traveler's diarrhea, colitis, lactic acid ordered consider mesenteric ischemia though I doubt this, he has no pain out of proportion.  He is not currently in A-fib, he has a regular pulse.  Will also check renal function, and provide IV rehydration and reassess.  I did review the oncology note, from 11/2023, and reviwed the charrt and nursing notes    Instructed the patient to hold Coumadin for 2 days.  To have the INR rechecked on the third day.    Fortunately no acute surgical emergency in the abdomen.  The patient did attempt to give a stool sample for 5 hours.    Lactate is 1.1.  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  Findings were given to the patient and discussed with daughter as well who is a pediatrician.    Doing all we can for this patient on Monday afternoon.  They do have established care here locally.  Strict return precautions additionally were given and understood Mr. Quiroga has peritoneal carcinomatosis and is 74 years old, history of A-fib, now with watery diarrhea recent trip to [place].  Differential diagnose includes traveler's diarrhea, colitis, lactic acid ordered consider mesenteric ischemia though I doubt this, he has no pain out of proportion.  He is not currently in A-fib, he has a regular pulse.  Will also check renal function, and provide IV rehydration and reassess.  I did review the oncology note, from 11/2023, and reviewed the chart and nursing notes    Instructed the patient to hold Coumadin for 2 days.  To have the INR rechecked on the third day.    Fortunately no acute surgical emergency in the abdomen.  The patient did attempt to give a stool sample for 5 hours.    Lactate is 1.1.  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  Findings were given to the patient and discussed with daughter as well who is a pediatrician.    Doing all we can for this patient on Monday afternoon.  They do have established care here locally.  Strict return precautions additionally were given and understood

## 2025-07-28 NOTE — ED ADULT NURSE NOTE - NSFALLUNIVINTERV_ED_ALL_ED
Bed/Stretcher in lowest position, wheels locked, appropriate side rails in place/Call bell, personal items and telephone in reach/Instruct patient to call for assistance before getting out of bed/chair/stretcher/Non-slip footwear applied when patient is off stretcher/[place] to call system/Physically safe environment - no spills, clutter or unnecessary equipment/Purposeful proactive rounding/Room/bathroom lighting operational, light cord in reach

## 2025-07-28 NOTE — ED PROVIDER NOTE - NSFOLLOWUPINSTRUCTIONS_ED_ALL_ED_FT
Return to the emergency department if you have persistent worsening abdominal pain, persistent vomiting, bloody stools, or if you have any new concerns.  Follow-up with your primary care physician within 3 days.  Thank you for letting us take care of you in the emergency department today. Return to the emergency department if you have persistent worsening abdominal pain, persistent vomiting, bloody stools, or if you have any new concerns.  Follow-up with your primary care physician within 3 days. Follow-up with gastroenterology to obtain stool studies.  Thank you for letting us take care of you in the emergency department today.

## 2025-07-28 NOTE — ED PROVIDER NOTE - PHYSICAL EXAMINATION
GEN: Alert and oriented x 3, in no acute distress, speaking full clear sentences Elderly, appears slightly disheveled but very pleasant  HEENT: NC/AT, PERRL, EOMI, normal oropharynx  NECK: Supple, nontender, FROM  CV: RRR, no m/r/g  PULM: CTA bilat, no wheezing/rales/rhonchi  ABD: mildly TTP, He does have an extensive midline surgical scar that appears to be well-healed, as well as a scar in the right lower quadrant.  EXTR: FROM to all extremities, nontender, no edema  SKIN: Warm, dry, no rash Mild venous stasis changes of the bilateral lower extremities, RP DP pulses 2+ and symmetric bilaterally  NEURO: AOx3, speaking full clear sentences, BLACKMAN 5/5 strength, ambulating with stable gait

## 2025-07-28 NOTE — ED PROVIDER NOTE - PATIENT PORTAL LINK FT
You can access the FollowMyHealth Patient Portal offered by [facility] by registering at the following website: [email or web address]. By joining [employer]’s FollowMyHealth portal, you will also be able to view your health information using other applications (apps) compatible with our system.

## 2025-07-28 NOTE — ED PROVIDER NOTE - OBJECTIVE STATEMENT
Mr. Shannon is a 74-year-old male with a past medical history of peritoneal carcinomatosis diagnosed several years ago, with operative intervention in 2023, with return of the malignancy since that time.  He apparently was in [place] 2 weeks ago, and just before that had an episode of near syncope, and diarrhea prior to departure, but over the past 2 weeks had persistent diarrhea, described as watery brown.  States he has had abdominal pain off-and-on but denies abdominal pain currently.  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  His daughter who is a physician, a pediatrician accompanies the patient.  States that they are worried about bowel obstruction.  He has been taking Imodium without relief.  He is also due for his screening CAT scan at this time.  He is followed by Dr. Montilla oncology. He denies F/C V or bloody stool Mr. Shannon is a 74-year-old male with a past medical history of peritoneal carcinomatosis diagnosed several years ago, with operative intervention in , with return of the malignancy since that time.  He apparently was in [place] 2 weeks ago, and just before that had an episode of near syncope, and diarrhea prior to departure, but over the past 2 weeks had persistent diarrhea, described as watery brown.  States he has had abdominal pain off-and-on but denies abdominal pain currently.  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  His daughter who is a physician, a pediatrician accompanies the patient.  States that they are worried about bowel obstruction.  He has been taking Imodium without relief.  He is also due for his screening CAT scan at this time.  He is followed by Dr. Montilla oncology. He denies F/C V or bloody stool    Attendin-year-old male presents with diarrhea for 2 weeks.  Patient has decreased appetite.  He states he does not want to eat because when he does eat he has diarrhea.  There is no vomiting.  No nausea.  No fever or chills.  Has mild abdominal discomfort.

## 2025-07-28 NOTE — ED ADULT NURSE REASSESSMENT NOTE - NS ED NURSE REASSESS COMMENT FT1
Pt with no acute changes at this time. Pt A&ox4, ambulatory, on room air. Pt pending CT results, stool sample and repeat coag results. Pt respirations even and unlabored, chest rise and fall equal b/l. Pt denies chest pain, HA, SOB, dizziness, N/V/D, fever/chills. Stretcher in lowest position, call bell within reach, pt safety maintained.
break coverage RN: Pt is A&Ox4, appears comfortable resting in stretcher. No BM since arrival to ED. VS as noted - MD Lopez made aware. breathing is even and unlabored. Stretcher set in lowest position, call bell within reach, safety maintained.

## 2025-07-28 NOTE — ED ADULT TRIAGE NOTE - BP NONINVASIVE DIASTOLIC (MM HG)
Telephone Encounter by Juan Saeed MD at 10/26/17 12:20 PM     Author:  Juan Saeed MD Service:  (none) Author Type:  Physician     Filed:  10/26/17 12:22 PM Encounter Date:  10/26/2017 Status:  Signed     :  Juan Saeed MD (Physician)            Increase to 65u    Then, increase by 5 units every 5 days until fasting readings less than 150 and can download then or in 2 weeks, whichever is sooner[VP1.1M]      Revision History        User Key Date/Time User Provider Type Action    > VP1.1 10/26/17 12:22 PM Juan Saeed MD Physician Sign    M - Manual             80

## 2025-07-28 NOTE — ED PROVIDER NOTE - NSFOLLOWUPCLINICS_GEN_ALL_ED_FT
[facility]  Gastroenterology  [address] [place] [id number]  Phone: [phone]  Fax:   Follow Up Time: 1-3 Days

## 2025-07-31 ENCOUNTER — TRANSCRIPTION ENCOUNTER (OUTPATIENT)
Age: 75
End: 2025-07-31

## 2025-07-31 LAB — INR PPP: 1.92 RATIO

## 2025-08-15 ENCOUNTER — TRANSCRIPTION ENCOUNTER (OUTPATIENT)
Age: 75
End: 2025-08-15

## 2025-09-19 ENCOUNTER — TRANSCRIPTION ENCOUNTER (OUTPATIENT)
Age: 75
End: 2025-09-19

## 2025-09-19 ENCOUNTER — RX RENEWAL (OUTPATIENT)
Age: 75
End: 2025-09-19

## 2025-09-19 RX ORDER — LISINOPRIL 2.5 MG/1
2.5 TABLET ORAL
Qty: 90 | Refills: 0 | Status: ACTIVE | COMMUNITY
Start: 2025-09-19 | End: 1900-01-01

## 2025-09-20 ENCOUNTER — TRANSCRIPTION ENCOUNTER (OUTPATIENT)
Age: 75
End: 2025-09-20

## (undated) DEVICE — SUT MAXON 1 36" GS-24

## (undated) DEVICE — SUT VICRYL 3-0 27" SH UNDYED

## (undated) DEVICE — GOWN LG EXTRA LONG

## (undated) DEVICE — DRSG TAPE UMBILICAL COTTON 2" X 30 X 1/8"

## (undated) DEVICE — FOLEY TRAY 16FR 5CC LF UMETER CLOSED

## (undated) DEVICE — PACK MAJOR ABDOMINAL SUPINE

## (undated) DEVICE — KIT SPILL CHEMO

## (undated) DEVICE — SUT VICRYL 0 18" TIES UNDYED

## (undated) DEVICE — SUT SOFSILK 3-0 18" V-20 (POP-OFF)

## (undated) DEVICE — ELCTR BOVIE PENCIL SMOKE EVACUATION

## (undated) DEVICE — SOL IRR POUR H2O 1500ML

## (undated) DEVICE — SUT SOFSILK 2-0 18" C-23

## (undated) DEVICE — ELCTR BOVIE BLADE 3/4" EXTENDED LENGTH 6"

## (undated) DEVICE — LIJ/LIA-HYPOTHERMIA GAYMAR: Type: DURABLE MEDICAL EQUIPMENT

## (undated) DEVICE — SUT SILK 3-0 18" SH (POP-OFF)

## (undated) DEVICE — DRAPE TOWEL BLUE 17" X 24"

## (undated) DEVICE — CUSA CLARITY QUICK CONNECT CARTRIDGE & TUBING SET

## (undated) DEVICE — POSITIONER STRAP ARMBOARD VELCRO TS-30

## (undated) DEVICE — LIGASURE IMPACT

## (undated) DEVICE — SUT POLYSORB 0 30" GS-21 UNDYED

## (undated) DEVICE — DRSG PREVENA PLUS SYSTEM

## (undated) DEVICE — SUT PDS II 1 48" TP-1

## (undated) DEVICE — WARMING BLANKET UPPER ADULT

## (undated) DEVICE — PACK ABDOMINAL CLOSURE

## (undated) DEVICE — SUT SOFSILK 2-0 18" V-20 (POP-OFF)

## (undated) DEVICE — CUSA CLARITY TORQUE WRENCH 23KHZ

## (undated) DEVICE — CUSA CLARITY TIP 23KHZ TOUGH TISSUE

## (undated) DEVICE — HANDPIECE SINGLE FUNCTION ABC

## (undated) DEVICE — SPECIMEN CONTAINER 8OZ W LID

## (undated) DEVICE — SUT VICRYL 2-0 18" TIES UNDYED

## (undated) DEVICE — VENODYNE/SCD SLEEVE CALF MEDIUM

## (undated) DEVICE — SUT POLYSORB 3-0 30" V-20 UNDYED

## (undated) DEVICE — SUT SURGIPRO 1 60" GS-26

## (undated) DEVICE — CANISTER DISPOSABLE THIN WALL 3000CC

## (undated) DEVICE — BLADE SURGICAL #15 CARBON

## (undated) DEVICE — ELCTR GROUNDING PAD ADULT COVIDIEN

## (undated) DEVICE — SUT VICRYL 2-0 27" SH UNDYED

## (undated) DEVICE — WARMING BLANKET DUO-THERM HYPER/HYPOTHERM ADULT

## (undated) DEVICE — CATH NG TL FILTER ABRAM 5/8X15"

## (undated) DEVICE — GLV 7.5 PROTEXIS (WHITE)

## (undated) DEVICE — SOL IRR POUR NS 0.9% 1500ML